# Patient Record
Sex: MALE | Race: WHITE | NOT HISPANIC OR LATINO | Employment: OTHER | URBAN - METROPOLITAN AREA
[De-identification: names, ages, dates, MRNs, and addresses within clinical notes are randomized per-mention and may not be internally consistent; named-entity substitution may affect disease eponyms.]

---

## 2017-12-12 ENCOUNTER — IMMUNIZATION (OUTPATIENT)
Dept: FAMILY MEDICINE | Facility: CLINIC | Age: 77
End: 2017-12-12
Payer: MEDICARE

## 2017-12-12 PROCEDURE — G0008 ADMIN INFLUENZA VIRUS VAC: HCPCS | Mod: S$GLB,,, | Performed by: INTERNAL MEDICINE

## 2017-12-12 PROCEDURE — 90662 IIV NO PRSV INCREASED AG IM: CPT | Mod: S$GLB,,, | Performed by: INTERNAL MEDICINE

## 2018-01-29 ENCOUNTER — DOCUMENTATION ONLY (OUTPATIENT)
Dept: FAMILY MEDICINE | Facility: CLINIC | Age: 78
End: 2018-01-29

## 2018-01-29 NOTE — PROGRESS NOTES
Pre-Visit Chart Review  For Appointment Scheduled on 1/30/18.    Health Maintenance Due   Topic Date Due    Lipid Panel  1940    TETANUS VACCINE  10/17/1957    Zoster Vaccine  10/17/1999    Pneumococcal (65+) (1 of 2 - PCV13) 10/17/2004

## 2018-01-30 ENCOUNTER — LAB VISIT (OUTPATIENT)
Dept: LAB | Facility: HOSPITAL | Age: 78
End: 2018-01-30
Attending: FAMILY MEDICINE
Payer: MEDICARE

## 2018-01-30 ENCOUNTER — OFFICE VISIT (OUTPATIENT)
Dept: FAMILY MEDICINE | Facility: CLINIC | Age: 78
End: 2018-01-30
Payer: MEDICARE

## 2018-01-30 VITALS
DIASTOLIC BLOOD PRESSURE: 76 MMHG | HEART RATE: 65 BPM | BODY MASS INDEX: 37.65 KG/M2 | TEMPERATURE: 98 F | WEIGHT: 263 LBS | SYSTOLIC BLOOD PRESSURE: 130 MMHG | RESPIRATION RATE: 20 BRPM | HEIGHT: 70 IN

## 2018-01-30 DIAGNOSIS — Z29.9 PREVENTIVE MEASURE: ICD-10-CM

## 2018-01-30 DIAGNOSIS — R35.0 INCREASED URINARY FREQUENCY: ICD-10-CM

## 2018-01-30 DIAGNOSIS — Z00.00 ANNUAL PHYSICAL EXAM: Primary | ICD-10-CM

## 2018-01-30 DIAGNOSIS — Z11.4 ENCOUNTER FOR SCREENING FOR HIV: ICD-10-CM

## 2018-01-30 DIAGNOSIS — Z00.00 ANNUAL PHYSICAL EXAM: ICD-10-CM

## 2018-01-30 DIAGNOSIS — N40.0 BENIGN PROSTATIC HYPERPLASIA, UNSPECIFIED WHETHER LOWER URINARY TRACT SYMPTOMS PRESENT: ICD-10-CM

## 2018-01-30 DIAGNOSIS — L57.0 ACTINIC KERATOSIS: ICD-10-CM

## 2018-01-30 LAB
25(OH)D3+25(OH)D2 SERPL-MCNC: 22 NG/ML
ALBUMIN SERPL BCP-MCNC: 3.8 G/DL
ALP SERPL-CCNC: 72 U/L
ALT SERPL W/O P-5'-P-CCNC: 16 U/L
ANION GAP SERPL CALC-SCNC: 14 MMOL/L
AST SERPL-CCNC: 16 U/L
BASOPHILS # BLD AUTO: 0.07 K/UL
BASOPHILS NFR BLD: 0.9 %
BILIRUB SERPL-MCNC: 0.6 MG/DL
BUN SERPL-MCNC: 18 MG/DL
CALCIUM SERPL-MCNC: 10.3 MG/DL
CHLORIDE SERPL-SCNC: 102 MMOL/L
CHOLEST SERPL-MCNC: 213 MG/DL
CHOLEST/HDLC SERPL: 5.1 {RATIO}
CO2 SERPL-SCNC: 24 MMOL/L
COMPLEXED PSA SERPL-MCNC: 9.7 NG/ML
CREAT SERPL-MCNC: 1 MG/DL
DIFFERENTIAL METHOD: NORMAL
EOSINOPHIL # BLD AUTO: 0.1 K/UL
EOSINOPHIL NFR BLD: 1.5 %
ERYTHROCYTE [DISTWIDTH] IN BLOOD BY AUTOMATED COUNT: 12.9 %
EST. GFR  (AFRICAN AMERICAN): >60 ML/MIN/1.73 M^2
EST. GFR  (NON AFRICAN AMERICAN): >60 ML/MIN/1.73 M^2
ESTIMATED AVG GLUCOSE: 117 MG/DL
GLUCOSE SERPL-MCNC: 92 MG/DL
HBA1C MFR BLD HPLC: 5.7 %
HCT VFR BLD AUTO: 45 %
HCV AB SERPL QL IA: NEGATIVE
HDLC SERPL-MCNC: 42 MG/DL
HDLC SERPL: 19.7 %
HGB BLD-MCNC: 15.3 G/DL
HIV 1+2 AB+HIV1 P24 AG SERPL QL IA: NEGATIVE
IMM GRANULOCYTES # BLD AUTO: 0.02 K/UL
IMM GRANULOCYTES NFR BLD AUTO: 0.3 %
LDLC SERPL CALC-MCNC: 150 MG/DL
LYMPHOCYTES # BLD AUTO: 1.6 K/UL
LYMPHOCYTES NFR BLD: 19.7 %
MCH RBC QN AUTO: 30.2 PG
MCHC RBC AUTO-ENTMCNC: 34 G/DL
MCV RBC AUTO: 89 FL
MONOCYTES # BLD AUTO: 0.9 K/UL
MONOCYTES NFR BLD: 10.8 %
NEUTROPHILS # BLD AUTO: 5.3 K/UL
NEUTROPHILS NFR BLD: 66.8 %
NONHDLC SERPL-MCNC: 171 MG/DL
NRBC BLD-RTO: 0 /100 WBC
PLATELET # BLD AUTO: 251 K/UL
PMV BLD AUTO: 10.6 FL
POTASSIUM SERPL-SCNC: 4.3 MMOL/L
PROT SERPL-MCNC: 7.7 G/DL
RBC # BLD AUTO: 5.07 M/UL
SODIUM SERPL-SCNC: 140 MMOL/L
TRIGL SERPL-MCNC: 105 MG/DL
TSH SERPL DL<=0.005 MIU/L-ACNC: 1.76 UIU/ML
WBC # BLD AUTO: 7.97 K/UL

## 2018-01-30 PROCEDURE — 93010 ELECTROCARDIOGRAM REPORT: CPT | Mod: S$GLB,,, | Performed by: INTERNAL MEDICINE

## 2018-01-30 PROCEDURE — 83036 HEMOGLOBIN GLYCOSYLATED A1C: CPT

## 2018-01-30 PROCEDURE — 36415 COLL VENOUS BLD VENIPUNCTURE: CPT | Mod: PO

## 2018-01-30 PROCEDURE — 82306 VITAMIN D 25 HYDROXY: CPT

## 2018-01-30 PROCEDURE — 99387 INIT PM E/M NEW PAT 65+ YRS: CPT | Mod: S$GLB,,, | Performed by: FAMILY MEDICINE

## 2018-01-30 PROCEDURE — 84153 ASSAY OF PSA TOTAL: CPT

## 2018-01-30 PROCEDURE — 99999 PR PBB SHADOW E&M-EST. PATIENT-LVL IV: CPT | Mod: PBBFAC,,, | Performed by: FAMILY MEDICINE

## 2018-01-30 PROCEDURE — 80053 COMPREHEN METABOLIC PANEL: CPT

## 2018-01-30 PROCEDURE — 86592 SYPHILIS TEST NON-TREP QUAL: CPT

## 2018-01-30 PROCEDURE — 86803 HEPATITIS C AB TEST: CPT

## 2018-01-30 PROCEDURE — 86703 HIV-1/HIV-2 1 RESULT ANTBDY: CPT

## 2018-01-30 PROCEDURE — 80061 LIPID PANEL: CPT

## 2018-01-30 PROCEDURE — 93005 ELECTROCARDIOGRAM TRACING: CPT | Mod: S$GLB,,, | Performed by: FAMILY MEDICINE

## 2018-01-30 PROCEDURE — 84443 ASSAY THYROID STIM HORMONE: CPT

## 2018-01-30 PROCEDURE — 85025 COMPLETE CBC W/AUTO DIFF WBC: CPT

## 2018-01-30 RX ORDER — ASPIRIN 81 MG/1
81 TABLET ORAL DAILY
COMMUNITY
End: 2020-10-30

## 2018-01-30 RX ORDER — TAMSULOSIN HYDROCHLORIDE 0.4 MG/1
0.4 CAPSULE ORAL DAILY
Qty: 30 CAPSULE | Refills: 1 | Status: SHIPPED | OUTPATIENT
Start: 2018-01-30 | End: 2018-03-29 | Stop reason: SDUPTHER

## 2018-01-30 NOTE — PROGRESS NOTES
[facility]                                                History and Physical Note    Subjective:         Patient ID: Ramon Reilly is a 77 y.o. male.    Chief Complaint: Annual Exam and Establish Care    HPI 78 yo M with no significant PMHx here to establish care. He recently relocated here with his wife from [place]. He reports increased urinary frequency. He urinates every 1-2 hours and 2-3 times at night. He admits to having a weak stream, decreased urine volume and urgency. He denies any dysuria, foul-smelling urine, fevers or abdominal pain. He also reports tingling and numbness in his feet over the past 3 years. He denies polyuria, polydipsia, polyphagia, weight loss or fatigue. He has multiple skin lesions that will need to be evaluated.     History reviewed. No pertinent past medical history.  Past Surgical History:   Procedure Laterality Date    EYE SURGERY Right     eye lid surgery     HERNIA REPAIR  1990    WISDOM TOOTH EXTRACTION       History reviewed. No pertinent family history.  Social History   Substance Use Topics    Smoking status: Former Smoker    Smokeless tobacco: Never Used    Alcohol use Yes      Comment: social, at weddings      Review of patient's allergies indicates:  No Known Allergies    Review of Systems   Constitutional: Negative for appetite change, chills, diaphoresis, fatigue, fever and unexpected weight change.   HENT: Negative for rhinorrhea, sinus pain, sinus pressure, sore throat and tinnitus.    Eyes: Negative for photophobia and visual disturbance.   Respiratory: Negative for cough, chest tightness, shortness of breath and wheezing.    Cardiovascular: Negative for chest pain, palpitations and leg swelling.   Gastrointestinal: Negative for abdominal distention, abdominal pain, blood in stool, constipation, diarrhea, nausea and vomiting.   Endocrine: Negative for polydipsia, polyphagia and polyuria.  "  Genitourinary: Positive for decreased urine volume, difficulty urinating, frequency and urgency. Negative for dysuria, flank pain and hematuria.   Musculoskeletal: Negative for arthralgias, back pain and myalgias.   Skin: Positive for rash. Negative for color change.   Neurological: Positive for numbness. Negative for dizziness, weakness, light-headedness and headaches.       Objective:      Vitals:    01/30/18 0907 01/30/18 0924   BP: (!) 146/86 130/76   BP Location: Right arm Right arm   Patient Position: Sitting Sitting   BP Method: Large (Automatic) Large (Automatic)   Pulse: 65    Resp: 20    Temp: 97.9 °F (36.6 °C)    TempSrc: Oral    Weight: 119.3 kg (263 lb 0.1 oz)    Height: 5' 10" (1.778 m)      Body mass index is 37.74 kg/m².  Physical Exam   Constitutional: He is oriented to person, place, and time. He appears well-developed and well-nourished.   HENT:   Head: Normocephalic and atraumatic.   Eyes: Right eye exhibits no discharge. Left eye exhibits no discharge. No scleral icterus.   Neck: Normal range of motion. No thyromegaly present.   Cardiovascular: Normal rate, regular rhythm, normal heart sounds and intact distal pulses.  Exam reveals no gallop and no friction rub.    No murmur heard.  Pulmonary/Chest: Effort normal and breath sounds normal. No respiratory distress. He has no wheezes. He has no rales.   Abdominal: Soft. Bowel sounds are normal. He exhibits no distension. There is no tenderness. A hernia is present.   Musculoskeletal: Normal range of motion.   Neurological: He is alert and oriented to person, place, and time.   Skin: Skin is warm and dry. Rash noted.   Psychiatric: He has a normal mood and affect.       Assessment:       1. Annual physical exam    2. Preventive measure    3. Encounter for screening for HIV    4. Increased urinary frequency    5. Benign prostatic hyperplasia, unspecified whether lower urinary tract symptoms present    6. Actinic keratosis        Plan:       Annual " physical exam  -     CBC auto differential; Future; Expected date: 01/30/2018  -     Comprehensive metabolic panel; Future; Expected date: 01/30/2018  -     Hemoglobin A1c; Future; Expected date: 01/30/2018  -     Lipid panel; Future; Expected date: 01/30/2018  -     TSH; Future; Expected date: 01/30/2018  -     Vitamin D; Future; Expected date: 01/30/2018  -     EKG 12-lead    Preventive measure  -     Hepatitis C antibody; Future; Expected date: 01/30/2018  -     RPR; Future; Expected date: 01/30/2018    Encounter for screening for HIV  -     HIV-1 and HIV-2 antibodies; Future; Expected date: 01/30/2018    Increased urinary frequency  -     PSA, Screening; Future; Expected date: 01/30/2018    Benign prostatic hyperplasia, unspecified whether lower urinary tract symptoms present  -     tamsulosin (FLOMAX) 0.4 mg Cp24; Take 1 capsule (0.4 mg total) by mouth once daily.  Dispense: 30 capsule; Refill: 1    Actinic keratosis  -     Ambulatory referral to Dermatology      Follow-up in about 2 weeks (around 2/13/2018).  Antwan Fontana MD  [facility]  1/30/2018 10:16 AM

## 2018-01-31 LAB — RPR SER QL: NORMAL

## 2018-02-01 DIAGNOSIS — R97.20 ELEVATED PSA: Primary | ICD-10-CM

## 2018-02-14 ENCOUNTER — DOCUMENTATION ONLY (OUTPATIENT)
Dept: FAMILY MEDICINE | Facility: CLINIC | Age: 78
End: 2018-02-14

## 2018-02-14 NOTE — PROGRESS NOTES
Pre-Visit Chart Review  For Appointment Scheduled on 02/16/2018    Health Maintenance Due   Topic Date Due    TETANUS VACCINE  10/17/1957    Zoster Vaccine  10/17/1999    Pneumococcal (65+) (1 of 2 - PCV13) 10/17/2004

## 2018-02-15 ENCOUNTER — OFFICE VISIT (OUTPATIENT)
Dept: UROLOGY | Facility: CLINIC | Age: 78
End: 2018-02-15
Payer: MEDICARE

## 2018-02-15 VITALS
WEIGHT: 263 LBS | DIASTOLIC BLOOD PRESSURE: 81 MMHG | SYSTOLIC BLOOD PRESSURE: 149 MMHG | HEART RATE: 76 BPM | HEIGHT: 70 IN | BODY MASS INDEX: 37.65 KG/M2 | RESPIRATION RATE: 18 BRPM

## 2018-02-15 DIAGNOSIS — N42.9 ABNORMAL PROSTATE ON PHYSICAL EXAMINATION: ICD-10-CM

## 2018-02-15 DIAGNOSIS — N40.0 BPH WITH ELEVATED PSA: ICD-10-CM

## 2018-02-15 DIAGNOSIS — R97.20 ELEVATED PSA, LESS THAN 10 NG/ML: Primary | ICD-10-CM

## 2018-02-15 DIAGNOSIS — R97.20 BPH WITH ELEVATED PSA: ICD-10-CM

## 2018-02-15 LAB
BILIRUB SERPL-MCNC: NORMAL MG/DL
BLOOD URINE, POC: NORMAL
COLOR, POC UA: CLEAR
GLUCOSE UR QL STRIP: NORMAL
KETONES UR QL STRIP: NORMAL
LEUKOCYTE ESTERASE URINE, POC: NORMAL
NITRITE, POC UA: NORMAL
PH, POC UA: 7
POC RESIDUAL URINE VOLUME: 63 ML (ref 0–100)
PROTEIN, POC: NORMAL
SPECIFIC GRAVITY, POC UA: 1.01
UROBILINOGEN, POC UA: NORMAL

## 2018-02-15 PROCEDURE — 81001 URINALYSIS AUTO W/SCOPE: CPT | Mod: S$GLB,,, | Performed by: NURSE PRACTITIONER

## 2018-02-15 PROCEDURE — 1159F MED LIST DOCD IN RCRD: CPT | Mod: S$GLB,,, | Performed by: NURSE PRACTITIONER

## 2018-02-15 PROCEDURE — 99999 PR PBB SHADOW E&M-EST. PATIENT-LVL III: CPT | Mod: PBBFAC,,, | Performed by: NURSE PRACTITIONER

## 2018-02-15 PROCEDURE — 1126F AMNT PAIN NOTED NONE PRSNT: CPT | Mod: S$GLB,,, | Performed by: NURSE PRACTITIONER

## 2018-02-15 PROCEDURE — 99204 OFFICE O/P NEW MOD 45 MIN: CPT | Mod: 25,S$GLB,, | Performed by: NURSE PRACTITIONER

## 2018-02-15 PROCEDURE — 3008F BODY MASS INDEX DOCD: CPT | Mod: S$GLB,,, | Performed by: NURSE PRACTITIONER

## 2018-02-15 PROCEDURE — 51798 US URINE CAPACITY MEASURE: CPT | Mod: S$GLB,,, | Performed by: NURSE PRACTITIONER

## 2018-02-15 NOTE — PATIENT INSTRUCTIONS
PSA Test     PSA is a simple blood test.   The prostate specific antigen (PSA) test is a blood test. It can help find prostate cancer. PSA is a substance in semen. Its made by the prostate. It is normal for some PSA to leak from the prostate into the bloodstream. But sometimes, more than a normal amount of PSA gets into the blood. The PSA test measures the amount of PSA in the blood. If the test shows high blood level of PSA, other tests are needed to help find the cause.  Possible causes of increased PSA  Several things can cause extra PSA to enter the blood, such as:  · Prostate cancer  · Prostate infection (prostatitis)  · Enlarged prostate not due to cancer (benign prostatic hyperplasia, or BPH)  · Prostate massage  · Prostate biopsy  Why a PSA test is done  A PSA test can be done to check for prostate cancer. But the PSA test by itself cant tell for sure whether or not a man has prostate cancer. And not all health care providers agree if men should have PSA tests to screen for prostate cancer. The American Cancer Society and many other organizations advise men talk with their health care provider about if prostate cancer screening is right for them. Talk with your health care provider about the PSA test beginning around age 50, or earlier if youre at higher risk.  A PSA test may also be done if a problem is found during a routine prostate exam. And it may be done if you have symptoms that suggest that you have a prostate problem. You may need a PSA if you have symptoms such as:  · Needing to urinate more often  · Waking often to urinate at night  · Having to strain when urinating  · Seeing blood in your urine  · Having pain when urinating  How a PSA test is done  Before a PSA test, you may have a digital rectal exam (LIZZIE) of the prostate. For this exam, the health care provider inserts a lubricated, gloved finger into the rectum to feel the prostate. Then youll be sent to have your blood drawn for the PSA  test. The test may be done in the health care providers office. Or it may be at a lab, clinic, or hospital. Blood is taken from your arm and sent to a lab to be tested.  Getting your results  The time it takes to get your test results varies. Ask your health care provider when you can expect your results. You and your health care provider will discuss the results. A normal range for your PSA depends on a number of factors. These include your age and the size of your prostate. They also include your risk factors for cancer, your symptoms, and the results of any previous PSA tests youve had. All of these things are taken into account when your PSA tests numbers are assessed.  If you're at higher risk for prostate cancer  If you are -American or have a family history of prostate cancer, talk with your health care provider about PSA tests by age 40 to 45.   Date Last Reviewed: 3/24/2015  © 4609-6783 [employer]. [address] [place] [address]. All rights reserved. This information is not intended as a substitute for professional medical care. Always follow your healthcare professional's instructions.        Prostate-Specific Antigen (PSA)  Does this test have other names?  PSA  What is this test?  This test measures the level of prostate-specific antigen (PSA) in your blood.  The cells of the prostate gland make the protein called PSA. Men normally have low levels of PSA. If your PSA levels start to rise, it could mean you have prostate cancer, benign prostate conditions, inflammation, or an infection.  PSA testing is controversial because the U.S. Preventative Services Task Force discourages men who don't have any symptoms of prostate cancer from being screened. The task force says that PSA test results can lead to treating small cancers that would never become life-threatening.  But the American Cancer Society believes men should be told the risks and benefits of PSA testing and  allowed to make their own decision about if and when to be screened.  The American Urologic Society says that PSA screening is most important between the ages of 55 and 69. If you have a brother or father with prostate cancer or you are , you should start testing at age 40.   Why do I need this test?  You may have this test if you are 50 or older and your healthcare provider wants to screen you for prostate cancer. Some providers recommend screening at age 40 or 45 if you have a family history of prostate cancer or other risk factors.  You may also have this test if you have already been diagnosed with prostate cancer, so that your healthcare provider can monitor your treatment and see whether your cancer has come back.  What other tests might I have along with this test?  Your healthcare provider may also do a digital rectal exam (LIZZIE). A LIZZIE is a physical exam of the prostate, not a lab test. For the exam, your provider will place a gloved finger in your rectum and feel the prostate to check for any bumps or abnormal areas. The FDA recommends that a LIZZIE be done along with a PSA test.  What do my test results mean?  Many things may affect your lab test results. These include the method each lab uses to do the test. Even if your test results are different from the normal value, you may not have a problem. To learn what the results mean for you, talk with your healthcare provider.  Results are given in nanograms per milliliter ng/mL. Normal results are below 4.0 ng/mL. A rising PSA may mean that you have cancer. But the PSA results alone won't tell your healthcare provider whether it's cancer or a benign prostate condition. If your provider suspects cancer, he or she will likely suggest that you have a biopsy of the prostate to make the diagnosis.  How is this test done?  The test requires a blood sample, which is drawn through a needle from a vein in your arm.  Does this test pose any risks?  Taking  a blood sample with a needle carries risks that include bleeding, infection, bruising, or feeling dizzy. When the needle pricks your arm, you may feel a slight stinging sensation or pain. Afterward, the site may be slightly sore.  What might affect my test results?  An infection can affect your results, as can certain medicines. Riding a bicycle and ejaculation may also affect your results.  How do I get ready for this test?  You may need to abstain from sex and not ride a bicycle within 1 to 2 days of the test. In addition, be sure your healthcare provider knows about all medicines, herbs, vitamins, and supplements you are taking. This includes medicines that don't need a prescription and any illicit drugs you may use.     © 7365-5242 [employer]. [address] [place] [address], [place] [address]. All rights reserved. This information is not intended as a substitute for professional medical care. Always follow your healthcare professional's instructions.

## 2018-02-15 NOTE — LETTER
February 16, 2018      Antwan Fontana MD  [address]  [place] [address]           [place] [facility]  [address] [place] [address] [address]  [place] [address]  Phone: [phone]  Fax: [phone]          Patient: Ramon Reilly   MR Number: [id number]   YOB: 1940   Date of Visit: 2/15/2018       Dear Dr. Antwan Fontana:    Thank you for referring Ramon Reilly to me for evaluation. Attached you will find relevant portions of my assessment and plan of care.    If you have questions, please do not hesitate to call me. I look forward to following Ramon Reilly along with you.    Sincerely,    Thalia Ortega, [facility]    Enclosure  CC:  No Recipients    If you would like to receive this communication electronically, please contact [email or web address] or [phone] to request more information on [employer] access.    For providers and/or their staff who would like to refer a patient to [facility], please contact us through our one-stop-shop provider referral line, [facility] , at [phone].    If you feel you have received this communication in error or would no longer like to receive these types of communications, please e-mail [email or web address]

## 2018-02-15 NOTE — PROGRESS NOTES
[facility] Note  Staff: TOMASA Crockett    Referring provider and please cc: Dr. Fontana  PCP: Dr. Antwan Fontana    Chief Complaint: Elevated PSA level    Subjective:        HPI: Ramno Reilly is a 77 y.o. male NEW PATIENT to Urology clinic today referred by his PCP for an elevated PSA level of 9.7 done on 01/30/2018 and is here today for further evaluation of findings.  Pt is here accompanied by his wife to office visit today.     Questions asked pt during ov today:  Pt was started on Flomax 0.4 mg one tablet daily two weeks ago by Dr. Fontana  Urgency: Sometimes, urge incontinence? None  NTF: 1-2 from 3-4x night, DTF: None  Dysuria: No  Gross Hematuria:No  Straining:No, Hesistancy:No, Intermittency:No, Weak stream:No    Last PSA Screening:   Lab Results   Component Value Date    PSA 9.7 (H) 01/30/2018     REVIEW OF SYSTEMS:  Review of Systems   Constitutional: Negative for chills, diaphoresis, fever and weight loss.   HENT: Negative for congestion, hearing loss, nosebleeds and sore throat.    Eyes: Negative for blurred vision and pain.        Wears glasses   Respiratory: Negative for cough and wheezing.    Cardiovascular: Negative for chest pain, palpitations and leg swelling.   Gastrointestinal: Negative for abdominal pain, heartburn, nausea and vomiting.   Genitourinary: Negative for dysuria, flank pain, frequency, hematuria and urgency.        Elevated PSA level  Nocturia   Musculoskeletal: Negative for back pain, joint pain, myalgias and neck pain.   Skin: Negative for itching and rash.   Neurological: Negative for dizziness, tremors, sensory change, seizures, loss of consciousness, weakness and headaches.   Endo/Heme/Allergies: Does not bruise/bleed easily.   Psychiatric/Behavioral: Negative for depression and suicidal ideas. The patient is not nervous/anxious.      Physical Exam    PMHx:  History reviewed. No pertinent past medical history.  Kidney stones: No  Cataracts?  None; He wears glasses    PSHx:  Past Surgical History:   Procedure Laterality Date    EYE SURGERY Right     eye lid surgery     HERNIA REPAIR  1990    TONSILLECTOMY, ADENOIDECTOMY      WISDOM TOOTH EXTRACTION       Stents/Valves/Foreign Bodies: No  Cardiac Evaluation: No    Screening Studies  Colonoscopy: Never had one.    Fam Hx:   malignancies: No  .   kidney stones: no     Soc Hx:  Former tobacco user, quit in the 1980s    Allergies:  Patient has no known allergies.    Medications: reviewed   Anticoagulation: Yes - Ecotrin 81 mg one tablet daily    Objective:     Vitals:    02/15/18 1435   BP: (!) 149/81   Pulse: 76   Resp: 18     General:WDWN in NAD  Eyes: PERRLA, normal conjunctiva  Respiratory: no increased work on breathing, clear to auscultation  Cardiovascular: regular rate and rhythm. No obvious extremity edema.  GI: palpation of masses. No tenderness. No hepatosplenomegaly to palpation.  Musculoskeletal: normal range of motion of bilateral upper extremities. Normal muscle strength and tone.  Skin: no obvious rashes or lesions. No tightening of skin noted.  Neurologic: CN grossly normal. Normal sensation.   Psychiatric: awake, alert and oriented x 3. Mood and affect normal. Cooperative.    :  Inspection of anus and perineum normal  LIZZIE: ++Enlarged with nodular abnormalities felt on exam by me today.  No c/o pain or tenderness. SV not palpable. Normal sphincter tone. No hemhorroids.  PVR by bladder scan performed by MIRIAM Vargas MA today: 63 mL*    LABS REVIEW:  UA today:  Color:Clear, Yellow  Spec. Grav.  1.010  PH  7.0  Negative for leukocytes, nitrates, protein, glucose, ketones, urobili, bili, and blood.    UCx: No results found for this or any previous visit.  Cr:   Lab Results   Component Value Date    CREATININE 1.0 01/30/2018     Assessment:       1. Elevated PSA, less than 10 ng/ml          Plan:     -Pt was instructed to continue Flomax 0.4 mg daily for now.    F/u--Pt will be fit in to see  one of Urologists ASAP for abnormal PSA level and Prostate exam by me which was performed today.    [facility]: Active    Thalia Ortega, FNP-C

## 2018-02-16 ENCOUNTER — OFFICE VISIT (OUTPATIENT)
Dept: FAMILY MEDICINE | Facility: CLINIC | Age: 78
End: 2018-02-16
Payer: MEDICARE

## 2018-02-16 VITALS
HEART RATE: 62 BPM | SYSTOLIC BLOOD PRESSURE: 124 MMHG | TEMPERATURE: 99 F | WEIGHT: 269.63 LBS | DIASTOLIC BLOOD PRESSURE: 70 MMHG | HEIGHT: 70 IN | BODY MASS INDEX: 38.6 KG/M2

## 2018-02-16 DIAGNOSIS — E78.5 HYPERLIPIDEMIA, UNSPECIFIED HYPERLIPIDEMIA TYPE: ICD-10-CM

## 2018-02-16 DIAGNOSIS — R53.1 GENERAL WEAKNESS: ICD-10-CM

## 2018-02-16 DIAGNOSIS — R73.03 PREDIABETES: Primary | ICD-10-CM

## 2018-02-16 PROCEDURE — 1126F AMNT PAIN NOTED NONE PRSNT: CPT | Mod: S$GLB,,, | Performed by: PHYSICIAN ASSISTANT

## 2018-02-16 PROCEDURE — 99999 PR PBB SHADOW E&M-EST. PATIENT-LVL IV: CPT | Mod: PBBFAC,,, | Performed by: PHYSICIAN ASSISTANT

## 2018-02-16 PROCEDURE — 3008F BODY MASS INDEX DOCD: CPT | Mod: S$GLB,,, | Performed by: PHYSICIAN ASSISTANT

## 2018-02-16 PROCEDURE — G0009 ADMIN PNEUMOCOCCAL VACCINE: HCPCS | Mod: S$GLB,,, | Performed by: FAMILY MEDICINE

## 2018-02-16 PROCEDURE — 99214 OFFICE O/P EST MOD 30 MIN: CPT | Mod: S$GLB,,, | Performed by: PHYSICIAN ASSISTANT

## 2018-02-16 PROCEDURE — 90732 PPSV23 VACC 2 YRS+ SUBQ/IM: CPT | Mod: S$GLB,,, | Performed by: FAMILY MEDICINE

## 2018-02-16 PROCEDURE — 1159F MED LIST DOCD IN RCRD: CPT | Mod: S$GLB,,, | Performed by: PHYSICIAN ASSISTANT

## 2018-02-16 NOTE — PATIENT INSTRUCTIONS
Lifestyle Changes to Control Cholesterol  You can control your cholesterol through diet, exercise, weight management, quitting smoking, stress management, and taking your medicines right. These things can also lower your risk for cardiovascular disease.    Eating healthy  Your healthcare provider will give you information on diet changes you may need to make. Your provider may recommend that you see a registered dietitian for help with diet changes. Changes may include:  · Cutting back on the amount of fat and cholesterol in your meals  · Eating less salt (sodium). This is especially important if you have high blood pressure.  · Eating more fresh vegetables and fruits  · Eating lean proteins such as fish, poultry, beans, and peas  · Eating less red meat and processed meats  · Using low-fat dairy products  · Using vegetable and nut oils in limited amounts  · Limiting how many sweets and processed foods like chips, cookies, and baked goods that you eat   · Limiting how many sugar-sweetened beverages you drink  · Limiting how often you eat out  Getting exercise  Regular exercise is a good way to help your body control cholesterol. Regular exercise can help in many ways. It can:  · Raise your good cholesterol  · Help lower your bad cholesterol  · Let blood flow better through your body  · Give more oxygen to your muscles and tissues  · Help you manage your weight  · Help your heart pump better  · Lower your blood pressure  Your healthcare provider may recommend that you get more physical activity if you haven't been active. Your provider may recommend that you get moderate to vigorous physical activity for at least 40 minutes each day. You should do this for at least 3 to 4 days each week. A few examples of moderate to vigorous activity are:  · Walking at a brisk pace. This is about 3 to 4 miles per hour.  · Jogging or running  · Swimming or water aerobics  · Hiking  · Dancing  · Martial arts  · Tennis  · Riding a  bicycle or stationary bike  · Dancing  Managing your weight  If you are overweight or obese, your healthcare provider will work with you to help you lose weight and lower your BMI (body mass index). Making diet changes and getting more physical activity can help. Changing your diet will help you lose weight more easily than adding exercise.  Quitting smoking  Smoking and other tobacco use can raise cholesterol and make it harder to control. Quitting is tough. But millions of people have given up tobacco for good. You can quit, too! Think about some of the reasons below to quit smoking. Do any of them make you think twice about your smoking habit?  Stop smoking because it:  · Keeps your cholesterol high, even if you make all the other changes youre supposed to  · Damages your body. It especially harms your heart, lungs, skin, and blood vessels.  · Makes you more likely to have a heart attack (acute myocardial infarction), stroke, or cancer  · Stains your teeth  · Makes your skin, clothes, and breath smell bad  · Costs a lot of money  Controlling stress   Learn ways to control stress. This will help you deal with stress in your home and work life. Controlling stress can greatly lower your risk of getting cardiovascular disease.  Making the most of medicines  Healthy eating and exercise are a good start to keeping your cholesterol down. But you may need some extra help from medicine. If your doctor prescribes medicine, be sure to take it exactly as directed. Remember:  · Tell your healthcare provider about all other medicines you take. This includes vitamins and herbs.  · Tell your healthcare provider if you have any side effects after starting to take a medicine. Examples of side effects to watch for include muscle aches, weakness, blurred vision, rust-colored urine, yellowing of eyes or skin (jaundice), and headache.  · Dont skip a dose or stop taking your medicine because you feel better or because  your cholesterol numbers go down. Never stop taking your medicine unless your healthcare provider has told you its OK.  · Ask your healthcare provider if you have any questions about your medicines.  High risk groups  Some people may need to take medicines called statins to control their cholesterol. This is in addition to eating a healthy diet and getting regular exercise.  Statins can help you stay healthy. They can also help prevent a heart attack or stroke. You may need to take a statin if you are in one of these groups:  · Adults who have had a heart attack or stroke. Or adults who have had peripheral vascular disease, a ministroke (transient ischemic attack), or stable or unstable angina. This group also includes people who have had a procedure to restore blood flow through a blocked artery. These procedures include percutaneous coronary intervention, angioplasty, stent, and open-heart bypass surgery.  · Adults who have diabetes. Or adults who are at higher risk of having a heart attack or stroke and have an LDL cholesterol level of 70 to 189 mg/dL  · Adults who are 21 years old or older and have an LDL cholesterol level of 190 mg/dL or higher.  If you are in a high-risk group, talk with your healthcare provider about your treatment goals. Make sure you understand why these goals are important, based on your own health history and your family history of heart disease or high cholesterol.  Make a plan to have regular cholesterol checks. You may need to fast before getting this test. Also ask your provider about any side effects your medicines may cause. Let your provider know about any side effects you have. You may need to take more than one medicine to reach the cholesterol goals that you and your provider decide on.  Date Last Reviewed: 10/1/2016  © 1695-9117 [employer]. [address] [place] [address], [place] [address]. All rights reserved. This information is not intended as a substitute for  professional medical care. Always follow your healthcare professional's instructions.        Prediabetes  You have been diagnosed with prediabetes. This means that the level of sugar (glucose) in your blood is too high. If you have prediabetes, you are at risk for developing type 2 diabetes. Type 2 diabetes is diagnosed when the level of glucose in the blood reaches a certain high level. With prediabetes, it hasnt reached this point yet, but it is higher than normal. It is vital to make lifestyle changes to lower your blood sugar, improve your health, and prevent diabetes. This sheet will tell you more.      Why worry about prediabetes?  Prediabetes is a disease where the bodys cells have trouble using glucose in the blood for energy. As a result, too much glucose stays in the blood and can affect how your heart and blood vessels work. Without changes in diet and lifestyle, the problem can get worse. Once you have type 2 diabetes, it is chronic (ongoing) and needs to be managed for the rest of your life. Diabetes can harm the body and your health by damaging organs, such as your eyes and kidneys. It makes you more likely to have heart disease. And it can damage nerves and blood vessels.  Who is a risk for prediabetes?  The exact cause of prediabetes is not clear. But certain risk factors make a person more likely to have it. These include:  · A family history of type 2 diabetes  · Being overweight  · Being over age 45  · Have hypertension or elevated cholesterol   · Having had gestational diabetes  · Not being physically active  · Being ,  American, , , , or   Diagnosing prediabetes  Prediabetes may have no symptoms or you may have some of the symptoms of diabetes. The diagnosis is made with a blood test. You may have one or more of these blood tests:   · Fasting glucose test. Blood is taken and tested after you have fasted (not eaten) for at  least 8 hours. A normal test result is 99 milligrams per deciliter (mg/dL) or lower. Prediabetes is 100 mg/dL to 125 mg/dL. Diabetes is 126 mg/dL or higher.  · Glucose tolerance test. Your blood sugar is measured before and after you drink a very sugary liquid. A normal test result is 139 milligrams per deciliter (mg/dL) or lower. Prediabetes is 140 mg/dL to 199 mg/dL. Diabetes is 200 mg/dL or higher.  · Hemoglobin A1c (HbA1c). Your HbA1c is normal if it is below 5.7%. Prediabetes is 5.7% to 6.4%. Diabetes is 6.5% or higher.   Treating prediabetes  The best way to treat prediabetes is to lose at least 5% to 7% of your current weight and be more physically active by getting at least 150 minutes a week of physical activity. When sitting for long periods of time, get up for short sessions of light activity every 30 minutes. These changes help the bodys cells use blood sugar better. Even a small amount of weight loss can help. Work with your healthcare provider to make a plan to eat well and be more active. Keep in mind that small changes can add up. Other changes in your lifestyle (or even taking certain medicines, such as metformin) may make you less likely to develop diabetes. Your healthcare provider can talk with you about these.  Follow-up  If it is untreated, prediabetes can turn into diabetes. This is a serious health condition. Take steps to stop this from happening. Follow the treatment plan you have been given. You may have your blood glucose tested again in about 12 to 18 months.  Symptoms of diabetes  Let your healthcare provider know if you have any of the following:  · Always feel very tired  · Feel very thirsty or hungry much of the time  · Have to urinate often  · Lose weight for no reason  · Feel numbness or tingling in your fingers or toes  · Have cuts or bruises that dont seem to heal  · Have blurry vision   Date Last Reviewed: 5/1/2016  © 9518-7174 [employer]. [address] [place] [address]  Road, [place] [address]. All rights reserved. This information is not intended as a substitute for professional medical care. Always follow your healthcare professional's instructions.        Low-Cholesterol Diet  Your body needs cholesterol to build new cells and create certain hormones. There are 2 kinds of cholesterol in your blood:     · HDL (good) cholesterol. This prevents fat deposits (plaque) from building up in your arteries. In this way it protects against heart disease and stroke.  · LDL (bad) cholesterol. This stays in your body and sticks to artery walls. Over time it may block blood flow to the heart and brain. This can cause a heart attack or stroke.  The cholesterol in your blood comes from 2 sources: cholesterol in food that you eat and cholesterol that your liver makes. You should limit the amount of cholesterol in your diet. But the cholesterol that your body makes has the greatest disease risk. And your body makes more cholesterol when your diet is high in bad fats (saturated and trans fats). There are 2 kinds of fats you can eat:  · Good fats, or unsaturated fats (mono-unsaturated and poly-unsaturated). They raise the level of good cholesterol and lower the level of bad cholesterol. Good fats are found in vegetable oils such as olive, sunflower, corn, and soybean oils, and in nuts and seeds.  · Bad fats, or saturated fats (including foods high in cholesterol) and trans fats. These raise your risk of disease. They lower the good cholesterol and raise the level of bad cholesterol. Bad fats are found in animal products, including meat, whole-milk dairy products, and butter. Some plants are also high in bad fats (coconut and palm plants). Trans fats are found in hard (stick) margarines. They are also in many fast foods and commercially baked goods. Soft margarine sold in tubs has fewer trans fats and is safer to use.  High blood cholesterol is usually due to a diet high in saturated fat, along  with not being physically active. In some cases, genetics plays a role in causing high cholesterol. The tips below will help you create healthy eating habits that will help lower your blood cholesterol level.  Create a diet high in good fats, low in bad fats (and low in cholesterol)  The following steps will help you create a diet high in good fats and low in bad fats:  · Talk with your doctor before starting a low cholesterol diet or weight loss program.  · Learn to read nutrition labels and select appropriate portion sizes.  · When cooking, use plant-based unsaturated vegetable oils (sunflower, corn, soybean, canola, peanut, and olive oils).  · Avoid saturated fats found in animal products such as meat, dairy (whole-milk, cheese and ice cream), poultry skin, and egg yolks. Plants high in saturated oils include coconut oil, palm oil, and palm kernel oil.  · If you eat meat, choose smaller portions and lean cuts, such as round, varun, sirloin, or loin. Eat more meatless meals.  · Replace meat with fish at least 2 times a week. Fish is an important source of the unsaturated fat called omega-3 fatty acids. This fat has potential to lower the risk of heart disease.  · Replace whole-milk dairy products with low-fat or nonfat products. Try soy products. Soy helps to reduce total cholesterol.  · Supplement your diet with protective fibers. Eat nuts, seeds, and whole grains rather than white rice and bread. These foods lower both cholesterol and triglyceride levels. (Triglycerides are another fat found in the blood.) Walnuts are one of the best sources of omega-3 fatty acids.  · Eat plenty of fresh fruits and vegetables daily.  · Avoid fast foods and commercial baked goods. Assume they contain saturated fat unless labeled otherwise.  Date Last Reviewed: 8/1/2016  © 4778-9757 [employer]. [address] [place] [address], [place] [address]. All rights reserved. This information is not intended as a substitute for  professional medical care. Always follow your healthcare professional's instructions.        Diet: Diabetes  Food is an important tool that you can use to control diabetes and stay healthy. Eating well-balanced meals in the correct amounts will help you control your blood glucose levels and prevent low blood sugar reactions. It will also help you reduce the health risks of diabetes. There is no one specific diet that is right for everyone with diabetes. But there are general guidelines to follow. A registered dietitian (MARNIE) will create a tailored diet approach thats just right for you. He or she will also help you plan healthy meals and snacks. If you have any questions, call your dietitian for advice.     Guidelines for success  Talk with your healthcare provider before starting a diabetes diet or weight loss program. If you haven't talked with a dietitian yet, ask your provider for a referral. The following guidelines can help you succeed:  · Select foods from the 6 food groups below. Your dietitian will help you find food choices within each group. He or she will also show you serving sizes and how many servings you can have at each meal.  ¨ Grains, beans, and starchy vegetables  ¨ Vegetables  ¨ Fruit  ¨ Milk or yogurt  ¨ Meat, poultry, fish, or tofu  ¨ Healthy fats  · Check your blood sugar levels as directed by your provider. Take any medicine as prescribed by your provider.  · Learn to read food labels and pick the right portion sizes.  · Eat only the amount of food in your meal plan. Eat about the same amount of food at regular times each day. Dont skip meals. Eat meals 4 to 5 hours apart, with snacks in between.  · Limit alcohol. It raises blood sugar levels. Drink water or calorie-free diet drinks that use safe sweeteners.  · Eat less fat to help lower your risk of heart disease. Use nonfat or low-fat dairy products and lean meats. Avoid fried foods. Use cooking oils that are unsaturated, such as olive,  canola, or peanut oil.  · Talk with your dietitian about safe sugar substitutes.  · Avoid added salt. It can contribute to high blood pressure, which can cause heart disease. People with diabetes already have a risk of high blood pressure and heart disease.  · Stay at a healthy weight. If you need to lose weight, cut down on your portion sizes. But dont skip meals. Exercise is an important part of any weight management program. Talk with your provider about an exercise program thats right for you.  · For more information about the best diet plan for you, talk with a registered dietitian (RD). To find an RD in your area, contact:  ¨ Academy of Nutrition and Dietetics www.eatright.org  ¨ The American Diabetes Association [phone] www.diabetes.org  Date Last Reviewed: 8/1/2016  © 4247-0908 [employer]. [address] [place] [address], [place] [address]. All rights reserved. This information is not intended as a substitute for professional medical care. Always follow your healthcare professional's instructions.

## 2018-03-02 ENCOUNTER — CLINICAL SUPPORT (OUTPATIENT)
Dept: REHABILITATION | Facility: HOSPITAL | Age: 78
End: 2018-03-02
Payer: MEDICARE

## 2018-03-02 DIAGNOSIS — R53.1 WEAKNESS: Primary | ICD-10-CM

## 2018-03-02 DIAGNOSIS — M25.562 PAIN IN BOTH KNEES, UNSPECIFIED CHRONICITY: ICD-10-CM

## 2018-03-02 DIAGNOSIS — M25.561 PAIN IN BOTH KNEES, UNSPECIFIED CHRONICITY: ICD-10-CM

## 2018-03-02 PROCEDURE — 97161 PT EVAL LOW COMPLEX 20 MIN: CPT | Mod: PN

## 2018-03-02 PROCEDURE — G8978 MOBILITY CURRENT STATUS: HCPCS | Mod: CK,PN

## 2018-03-02 PROCEDURE — G8979 MOBILITY GOAL STATUS: HCPCS | Mod: CI,PN

## 2018-03-02 NOTE — PLAN OF CARE
"TIME RECORD    03/02/2018    Start Time:  1410  Stop Time:  1509    PROCEDURES:    TIMED  Procedure Time Min.    Start:  Stop:     Start:  Stop:     Start:  Stop:     Start:  Stop:          UNTIMED  Procedure Time Min.   Evaluation Start:  Stop:     Start:  Stop:      Total Timed Minutes:  0  Total Timed Units:  0  Total Untimed Units:  1  Charges Billed/# of units:  1    OUTPATIENT PHYSICAL THERAPY   PATIENT EVALUATION  Onset Date: 5-6 years  Problem List Items Addressed This Visit     Weakness - Primary    Knee pain, bilateral          Medical Diagnosis: General weakness  Treatment Diagnosis: Gait abnormality    No past medical history on file.    Past Surgical History:   Procedure Laterality Date    EYE SURGERY Right     eye lid surgery     HERNIA REPAIR  1990    TONSILLECTOMY, ADENOIDECTOMY      WISDOM TOOTH EXTRACTION         has a current medication list which includes the following prescription(s): aspirin and tamsulosin.    Precautions: reports hernia that was repaired years ago but has recurred  Surgical history: see EMR  Prior Therapy: no  History of Present Illness: Patient is a 78 yo M who presents to physical therapy with c/o chronic bilateral knee pain and generalized weakness.  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 Stairs--Uses step-to pattern " "negotiating stairs in his home for safety, there is 1 handrail from the 2nd to 3rd level, but from first to second level there is a wall to hold onto. He thinks he can walk with alternating steps on the stairs outside from 2nd to third level since there is a bilateral handrail.  Reports significant difficulty with carrying anything up the stairs. Sometimes has to put down the item and move a few stairs at a time.  2. Getting up/down out of chairs. Reports has to rock several times in order to get up from his recliner but is afraid the forward momentum will make him fall.  3. Getting up from the floor, has to pull himself up with furniture  4. Balance issues--reports he has fallen 3x in the last year. Recent fall trying to  something from the floor--fell forward onto his knees.    Subjective     Ramon Reilly states can't walk very far due to SOB. States his knees feel like his knees "hyperextend," when he walks and as a result he is very careful about placing his feet when turning around or getting out of chairs.     Pain:  Location: generalized bilateral L>R knee, bilateral ankles  Description: "like a sprain"  Activities Which Increase Pain: turning, twisting  Activities Which Decrease Pain: aleve  Pain Scale:  2-3/10 now  6/10 at worst      Objective     Posture/Appearance:Fwd head position, rounded shoulders, right trunk lean  Palpation: Min TTP medial joint line at knees  Sensation: WFL  DTRs: NT  Range of Motion/Strength:    Lower Extremity Range of Motion:  Right Lower Extremity: WFL  Left Lower Extremity: WFL    Lower Extremity Strength:  Right Lower Extremity: hip flex 4/5, knee ext/flex 4+/5, ankle DF 5/5  Left Lower Extremity: hip flex 4+/5, knee ext 4/5, knee flex 4/5, ankle DF 4+/5    Flexibility: tightness bilateral gastrocs, hamstrings  Gait: mildly antalgic, decreased velocity, decreased floor clearance  Bed Mobility: Supine<>sit Independent  Transfers: Sit<>stand with bilateral UE " support, increased difficulty  Special Tests:   Romberg (-)  Standing with EO, feet together: 30 sec with sway  Functional Outcome Measure: Lower Extremity Functional Scale (LEFS): 46/80  G code tool used: LEFS (mobility)  Current modifier: CK  Goal modifier: CI  Treatment: Educated on role/goal PT, POC.  Pt verbalizing understanding and agreement.     Assessment       Initial Assessment (Pertinent finding, problem list and factors affecting outcome): Patient is a 76 yo M presenting to PT with c/o weakness and bilateral knee pain. Notable impairments include decreased flexibility, weakness, gait abnormality, decreased balance, and impaired functional mobility. Patient would benefit from skilled PT to address notable impairments and improve functional mobility to [facility].      Rehab Potiential: good      Short Term Goals (3 Weeks): 1) Pt will initiate HEP 2) Patient will improve LE strength by 1/2 muscle grade   Long Term Goals (6 Weeks): 1) Pt will be I with HEP 2) Pt will return to community ambulation with strength 5/5 3) Pt will improve LEFS to <20% impairment    Plan     Certification Period: 3/2/18-4/13/18  Recommended Treatment Plan: 2 times per week for 6 weeks: Gait Training, Group Therapy, Manual Therapy, Moist Heat/ Ice, Patient Education, Therapeutic Activites and Therapeutic Exercise  Other Recommendations:     Thank you for referral.    Therapist: Allegra Verma, PT    I CERTIFY THE NEED FOR THESE SERVICES FURNISHED UNDER THIS PLAN OF TREATMENT AND WHILE UNDER MY CARE    Physician's comments: ________________________________________________________________________________________________________________________________________________      Physician's Name: ___________________________________

## 2018-03-04 ENCOUNTER — HOSPITAL ENCOUNTER (EMERGENCY)
Facility: HOSPITAL | Age: 78
Discharge: HOME | End: 2018-03-04
Attending: EMERGENCY MEDICINE
Payer: MEDICARE

## 2018-03-04 VITALS
OXYGEN SATURATION: 97 % | SYSTOLIC BLOOD PRESSURE: 167 MMHG | RESPIRATION RATE: 16 BRPM | BODY MASS INDEX: 38.6 KG/M2 | WEIGHT: 269 LBS | TEMPERATURE: 97 F | DIASTOLIC BLOOD PRESSURE: 73 MMHG | HEART RATE: 74 BPM

## 2018-03-04 DIAGNOSIS — K80.20 CALCULUS OF GALLBLADDER WITHOUT CHOLECYSTITIS WITHOUT OBSTRUCTION: Primary | ICD-10-CM

## 2018-03-04 LAB
ALBUMIN SERPL BCP-MCNC: 4 G/DL
ALP SERPL-CCNC: 73 U/L
ALT SERPL W/O P-5'-P-CCNC: 47 U/L
ANION GAP SERPL CALC-SCNC: 10 MMOL/L
AST SERPL-CCNC: 67 U/L
BASOPHILS # BLD AUTO: 0.1 K/UL
BASOPHILS NFR BLD: 0.4 %
BILIRUB SERPL-MCNC: 1.7 MG/DL
BILIRUB UR QL STRIP: NEGATIVE
BUN SERPL-MCNC: 20 MG/DL
CALCIUM SERPL-MCNC: 9.6 MG/DL
CHLORIDE SERPL-SCNC: 103 MMOL/L
CLARITY UR: CLEAR
CO2 SERPL-SCNC: 26 MMOL/L
COLOR UR: YELLOW
CREAT SERPL-MCNC: 1 MG/DL
DIFFERENTIAL METHOD: ABNORMAL
EOSINOPHIL # BLD AUTO: 0.1 K/UL
EOSINOPHIL NFR BLD: 0.6 %
ERYTHROCYTE [DISTWIDTH] IN BLOOD BY AUTOMATED COUNT: 12.9 %
EST. GFR  (AFRICAN AMERICAN): >60 ML/MIN/1.73 M^2
EST. GFR  (NON AFRICAN AMERICAN): >60 ML/MIN/1.73 M^2
GLUCOSE SERPL-MCNC: 143 MG/DL
GLUCOSE UR QL STRIP: NEGATIVE
HCT VFR BLD AUTO: 46.5 %
HGB BLD-MCNC: 15.7 G/DL
HGB UR QL STRIP: NEGATIVE
KETONES UR QL STRIP: NEGATIVE
LEUKOCYTE ESTERASE UR QL STRIP: NEGATIVE
LIPASE SERPL-CCNC: 43 U/L
LYMPHOCYTES # BLD AUTO: 1.4 K/UL
LYMPHOCYTES NFR BLD: 7.9 %
MCH RBC QN AUTO: 29.6 PG
MCHC RBC AUTO-ENTMCNC: 33.8 G/DL
MCV RBC AUTO: 87 FL
MONOCYTES # BLD AUTO: 0.9 K/UL
MONOCYTES NFR BLD: 5.3 %
NEUTROPHILS # BLD AUTO: 14.6 K/UL
NEUTROPHILS NFR BLD: 85.8 %
NITRITE UR QL STRIP: NEGATIVE
PH UR STRIP: 6 [PH] (ref 5–8)
PLATELET # BLD AUTO: 245 K/UL
PMV BLD AUTO: 9.2 FL
POTASSIUM SERPL-SCNC: 4.2 MMOL/L
PROT SERPL-MCNC: 7.7 G/DL
PROT UR QL STRIP: NEGATIVE
RBC # BLD AUTO: 5.32 M/UL
SODIUM SERPL-SCNC: 139 MMOL/L
SP GR UR STRIP: 1.02 (ref 1–1.03)
URN SPEC COLLECT METH UR: NORMAL
UROBILINOGEN UR STRIP-ACNC: 1 EU/DL
WBC # BLD AUTO: 17.1 K/UL

## 2018-03-04 PROCEDURE — 25000003 PHARM REV CODE 250: Performed by: EMERGENCY MEDICINE

## 2018-03-04 PROCEDURE — 81003 URINALYSIS AUTO W/O SCOPE: CPT

## 2018-03-04 PROCEDURE — 63600175 PHARM REV CODE 636 W HCPCS: Performed by: EMERGENCY MEDICINE

## 2018-03-04 PROCEDURE — 80053 COMPREHEN METABOLIC PANEL: CPT

## 2018-03-04 PROCEDURE — 85025 COMPLETE CBC W/AUTO DIFF WBC: CPT

## 2018-03-04 PROCEDURE — 83690 ASSAY OF LIPASE: CPT

## 2018-03-04 PROCEDURE — 99284 EMERGENCY DEPT VISIT MOD MDM: CPT | Mod: 25

## 2018-03-04 PROCEDURE — 96374 THER/PROPH/DIAG INJ IV PUSH: CPT

## 2018-03-04 PROCEDURE — 36415 COLL VENOUS BLD VENIPUNCTURE: CPT

## 2018-03-04 RX ORDER — PANTOPRAZOLE SODIUM 40 MG/1
40 TABLET, DELAYED RELEASE ORAL
Status: COMPLETED | OUTPATIENT
Start: 2018-03-04 | End: 2018-03-04

## 2018-03-04 RX ORDER — KETOROLAC TROMETHAMINE 30 MG/ML
15 INJECTION, SOLUTION INTRAMUSCULAR; INTRAVENOUS
Status: COMPLETED | OUTPATIENT
Start: 2018-03-04 | End: 2018-03-04

## 2018-03-04 RX ADMIN — LIDOCAINE HYDROCHLORIDE: 20 SOLUTION ORAL; TOPICAL at 02:03

## 2018-03-04 RX ADMIN — KETOROLAC TROMETHAMINE 15 MG: 30 INJECTION, SOLUTION INTRAMUSCULAR at 02:03

## 2018-03-04 RX ADMIN — PANTOPRAZOLE SODIUM 40 MG: 40 TABLET, DELAYED RELEASE ORAL at 02:03

## 2018-03-04 NOTE — ED PROVIDER NOTES
Encounter Date: 3/4/2018    SCRIBE #1 NOTE: I, Rod Dillon, am scribing for, and in the presence of, Dr. Caro.       History     Chief Complaint   Patient presents with    Chest Pain       03/04/2018 2:41 PM     Chief complaint: abdominal pain      Ramon Reilly is a 77 y.o. male without any significant PMHx who presents to the ED accompanied by his wife c/o abdominal pain. The pain is located in his epigastric region. The patient states it began after he ate breakfast this morning at 9am. It became worse after he ate lunch about 2 hours PTA. He says he can't take a deep breath because it causes discomfort. He has never had this type of discomfort before. He has no previous history of ulcers. He denies having any fever or blood in his stools. He has NKDA.      The history is provided by the patient.     Review of patient's allergies indicates:  No Known Allergies  History reviewed. No pertinent past medical history.  Past Surgical History:   Procedure Laterality Date    EYE SURGERY Right     eye lid surgery     HERNIA REPAIR  1990    TONSILLECTOMY, ADENOIDECTOMY      WISDOM TOOTH EXTRACTION       History reviewed. No pertinent family history.  Social History   Substance Use Topics    Smoking status: Former Smoker    Smokeless tobacco: Never Used    Alcohol use Yes      Comment: social, at weddings     Review of Systems   Constitutional: Negative for fever.   HENT: Negative for sore throat.    Respiratory: Negative for shortness of breath.    Cardiovascular: Negative for chest pain.   Gastrointestinal: Positive for abdominal pain and nausea.   Genitourinary: Negative for dysuria.   Musculoskeletal: Negative for back pain.   Skin: Negative for rash.   Neurological: Negative for weakness.   Hematological: Does not bruise/bleed easily.       Physical Exam     Initial Vitals   BP Pulse Resp Temp SpO2   -- -- -- -- --      MAP       --         Physical Exam    Nursing note and vitals  reviewed.  Constitutional: He appears well-developed and well-nourished.   HENT:   Head: Normocephalic and atraumatic.   Eyes: Conjunctivae are normal.   Neck: Normal range of motion. Neck supple.   Cardiovascular: Normal rate, regular rhythm and normal heart sounds. Exam reveals no gallop and no friction rub.    No murmur heard.  Pulmonary/Chest: Breath sounds normal. No respiratory distress. He has no wheezes. He has no rhonchi. He has no rales.   Abdominal: Soft. Bowel sounds are normal. There is tenderness in the right upper quadrant and epigastric area. There is positive Norris's sign.   Musculoskeletal: Normal range of motion.   Neurological: He is alert and oriented to person, place, and time.   Skin: Skin is warm and dry.   Psychiatric: He has a normal mood and affect.         ED Course   Procedures  Labs Reviewed   CBC W/ AUTO DIFFERENTIAL - Abnormal; Notable for the following:        Result Value    WBC 17.10 (*)     Gran # (ANC) 14.6 (*)     Gran% 85.8 (*)     Lymph% 7.9 (*)     All other components within normal limits   COMPREHENSIVE METABOLIC PANEL - Abnormal; Notable for the following:     Glucose 143 (*)     Total Bilirubin 1.7 (*)     AST 67 (*)     ALT 47 (*)     All other components within normal limits   LIPASE   URINALYSIS     EKG Readings: (Independently Interpreted)   Rhythm: Normal Sinus Rhythm. Heart Rate: 79. Ectopy: No Ectopy. Conduction: Normal. ST Segments: Non-Specific ST Segment Depression. T Waves: Normal. Axis: Left Axis Deviation. Clinical Impression: Normal Sinus Rhythm          Medical Decision Making:   History:   Old Medical Records: I decided to obtain old medical records.  Clinical Tests:   Lab Tests: Ordered and Reviewed  Radiological Study: Ordered and Reviewed  Medical Tests: Ordered and Reviewed  ED Management:  Ramon Reilly is a 77 y.o. male who presents with  epigastric pain.  He has a positive Norris sign with an ultrasound reveals cholelithiasis without cystitis  a choledocholithiasis.  He has symptomatic relief with Toradol and a GI cocktail.  Symptoms most likely reflect biliary colic.  The absence of evidence of choledocholithiasis or cholecystitis makes admission unnecessary.  Gastritis/peptic ulcer disease remains in the differential diagnosis; therefore, patient is encouraged to take daily Prilosec.  He is referred to general surgery for consideration of a cholecystectomy.       APC / Resident Notes:   I, Dr. Wilder Caro III, personally performed the services described in this documentation. All medical record entries made by the scribe were at my direction and in my presence.  I have reviewed the chart and agree that the record reflects my personal performance and is accurate and complete. Wilder Caro III, MD.  4:17 PM 03/04/2018       Scribe Attestation:   Scribe #1: I performed the above scribed service and the documentation accurately describes the services I performed. I attest to the accuracy of the note.               Clinical Impression:   There were no encounter diagnoses.                           Wilder Caro III, MD  03/04/18 [id number]

## 2018-03-05 ENCOUNTER — TELEPHONE (OUTPATIENT)
Dept: BARIATRICS | Facility: CLINIC | Age: 78
End: 2018-03-05

## 2018-03-05 PROBLEM — R53.1 WEAKNESS: Status: ACTIVE | Noted: 2018-03-05

## 2018-03-05 PROBLEM — M25.562 KNEE PAIN, BILATERAL: Status: ACTIVE | Noted: 2018-03-05

## 2018-03-05 PROBLEM — M25.561 KNEE PAIN, BILATERAL: Status: ACTIVE | Noted: 2018-03-05

## 2018-03-05 NOTE — TELEPHONE ENCOUNTER
----- Message from Lenin Arroyo sent at 3/5/2018  8:36 AM CST -----  Contact: Patient  Ramon, [phone], calling to get a follow up appointment. Was seen in the ER yesterday 3/4/18, and found he have gallstones. Was told to get a follow up appointment within two days with Dr. Mcgovern. Please advise. Thanks.

## 2018-03-06 ENCOUNTER — OFFICE VISIT (OUTPATIENT)
Dept: BARIATRICS | Facility: CLINIC | Age: 78
End: 2018-03-06
Payer: MEDICARE

## 2018-03-06 VITALS
WEIGHT: 267.5 LBS | RESPIRATION RATE: 16 BRPM | HEART RATE: 72 BPM | HEIGHT: 70 IN | DIASTOLIC BLOOD PRESSURE: 67 MMHG | BODY MASS INDEX: 38.3 KG/M2 | TEMPERATURE: 98 F | SYSTOLIC BLOOD PRESSURE: 140 MMHG

## 2018-03-06 DIAGNOSIS — K81.9 CHOLECYSTITIS: ICD-10-CM

## 2018-03-06 DIAGNOSIS — K80.10 CALCULUS OF GALLBLADDER WITH CHRONIC CHOLECYSTITIS WITHOUT OBSTRUCTION: Primary | ICD-10-CM

## 2018-03-06 PROCEDURE — 99203 OFFICE O/P NEW LOW 30 MIN: CPT | Mod: S$GLB,,, | Performed by: SURGERY

## 2018-03-06 PROCEDURE — 99214 OFFICE O/P EST MOD 30 MIN: CPT | Mod: PBBFAC,PN | Performed by: SURGERY

## 2018-03-06 PROCEDURE — 99999 PR PBB SHADOW E&M-EST. PATIENT-LVL IV: CPT | Mod: PBBFAC,,, | Performed by: SURGERY

## 2018-03-06 RX ORDER — OMEPRAZOLE 20 MG/1
20 CAPSULE, DELAYED RELEASE ORAL NIGHTLY
COMMUNITY
End: 2018-05-16

## 2018-03-06 RX ORDER — CHOLECALCIFEROL (VITAMIN D3) 25 MCG
2000 TABLET ORAL DAILY
Status: ON HOLD | COMMUNITY
End: 2018-06-04

## 2018-03-06 NOTE — PROGRESS NOTES
Initial Consult    Chief Complaint   Patient presents with    Cholelithiasis       History of Present Illness:  Patient is a 77 y.o. male who is referred for evaluation abdominal pain. Pain described as epigastric, 8/10, constant, post prandial (30 min to an hour after eating), stopped after coming to ED.  No prior episodes.  No changes to stool.    Went to ED on 3/4/18 found to have gallstones and referred here.    Complains of blood in urine as well- sees urologist.    Review of patient's allergies indicates:  No Known Allergies    Current Outpatient Prescriptions   Medication Sig Dispense Refill    aspirin (ECOTRIN) 81 MG EC tablet Take 81 mg by mouth once daily.      omeprazole (PRILOSEC) 20 MG capsule Take 20 mg by mouth once daily.      tamsulosin (FLOMAX) 0.4 mg Cp24 Take 1 capsule (0.4 mg total) by mouth once daily. 30 capsule 1    vitamin D 1000 units Tab Take 2,000 Units by mouth once daily.       No current facility-administered medications for this visit.        No past medical history on file.  Past Surgical History:   Procedure Laterality Date    EYE SURGERY Right     eye lid surgery     HERNIA REPAIR  1990    TONSILLECTOMY, ADENOIDECTOMY      WISDOM TOOTH EXTRACTION       No family history on file.  Social History   Substance Use Topics    Smoking status: Former Smoker    Smokeless tobacco: Never Used    Alcohol use Yes      Comment: social, at weddings            Review of Systems:  Review of Systems   Constitutional: Positive for fever. Negative for appetite change, chills and diaphoresis.   HENT: Positive for sneezing. Negative for congestion, rhinorrhea, sinus pressure and tinnitus.    Eyes: Negative for pain, redness and itching.   Respiratory: Negative for cough, choking, chest tightness, shortness of breath, wheezing and stridor.    Cardiovascular: Negative for chest pain, palpitations and leg swelling.   Gastrointestinal: Positive for abdominal pain (see hpi) and constipation.  "Negative for abdominal distention, blood in stool, diarrhea, nausea, rectal pain and vomiting.   Endocrine: Negative for cold intolerance and heat intolerance.   Genitourinary: Positive for hematuria. Negative for difficulty urinating and flank pain.   Musculoskeletal: Positive for back pain. Negative for arthralgias and gait problem.   Skin: Negative for rash and wound.   Allergic/Immunologic: Negative for environmental allergies and food allergies.   Neurological: Positive for light-headedness. Negative for dizziness and headaches.   Hematological: Negative for adenopathy. Does not bruise/bleed easily.   Psychiatric/Behavioral: Negative for agitation, confusion and decreased concentration.       Physical:     Vital Signs (Most Recent)  Temp: 98.1 °F (36.7 °C) (03/06/18 1023)  Pulse: 72 (03/06/18 1023)  Resp: 16 (03/06/18 1023)  BP: (!) 140/67 (03/06/18 1023)  5' 10" (1.778 m)  121.3 kg (267 lb 8.5 oz)     Physical Exam:  Physical Exam   Constitutional: He is oriented to person, place, and time. He appears well-developed and well-nourished. No distress.   HENT:   Head: Atraumatic.   Mouth/Throat: No oropharyngeal exudate.   Eyes: EOM are normal. Pupils are equal, round, and reactive to light. Right eye exhibits no discharge. Left eye exhibits no discharge. No scleral icterus.   Neck: Normal range of motion. No JVD present. No tracheal deviation present. No thyromegaly present.   Cardiovascular: Normal rate and regular rhythm.  Exam reveals no gallop and no friction rub.    No murmur heard.  Pulmonary/Chest: Breath sounds normal. No respiratory distress. He has no wheezes. He has no rales. He exhibits no tenderness.   Abdominal: Soft. Bowel sounds are normal. He exhibits no distension and no mass. There is no tenderness. There is no rebound and no guarding.   Midline incision with reducible hernia   Musculoskeletal: Normal range of motion. He exhibits no edema.   Neurological: He is alert and oriented to person, " place, and time.   Skin: No rash noted. He is not diaphoretic. No erythema.   Psychiatric: He has a normal mood and affect.       US Gallballder 3/4/18: Images independently reviewed: distended gb with gallstones    ASSESSMENT/PLAN:        1. Calculus of gallbladder with chronic cholecystitis without obstruction  Case Request Operating Room: CHOLECYSTECTOMY-LAPAROSCOPIC   2. Cholecystitis         Plan:  Ramon Reilly has cholecystitis.  Plan for lap celia March 12 as he has an upcoming vacation.

## 2018-03-07 ENCOUNTER — HOSPITAL ENCOUNTER (OUTPATIENT)
Dept: PREADMISSION TESTING | Facility: HOSPITAL | Age: 78
Discharge: HOME | End: 2018-03-07
Attending: SURGERY
Payer: MEDICARE

## 2018-03-07 PROCEDURE — 99900103 DSU ONLY-NO CHARGE-INITIAL HR (STAT)

## 2018-03-09 ENCOUNTER — ANESTHESIA EVENT (OUTPATIENT)
Dept: SURGERY | Facility: HOSPITAL | Age: 78
End: 2018-03-09
Payer: MEDICARE

## 2018-03-12 ENCOUNTER — SURGERY (OUTPATIENT)
Age: 78
End: 2018-03-12

## 2018-03-12 ENCOUNTER — TELEPHONE (OUTPATIENT)
Dept: BARIATRICS | Facility: CLINIC | Age: 78
End: 2018-03-12

## 2018-03-12 ENCOUNTER — HOSPITAL ENCOUNTER (OUTPATIENT)
Facility: HOSPITAL | Age: 78
LOS: 1 days | Discharge: HOME | End: 2018-03-12
Attending: SURGERY | Admitting: SURGERY
Payer: MEDICARE

## 2018-03-12 ENCOUNTER — ANESTHESIA (OUTPATIENT)
Dept: SURGERY | Facility: HOSPITAL | Age: 78
End: 2018-03-12
Payer: MEDICARE

## 2018-03-12 DIAGNOSIS — R53.1 GENERAL WEAKNESS: ICD-10-CM

## 2018-03-12 DIAGNOSIS — E78.5 HYPERLIPIDEMIA, UNSPECIFIED HYPERLIPIDEMIA TYPE: ICD-10-CM

## 2018-03-12 DIAGNOSIS — M25.561 PAIN IN BOTH KNEES, UNSPECIFIED CHRONICITY: ICD-10-CM

## 2018-03-12 DIAGNOSIS — M25.562 PAIN IN BOTH KNEES, UNSPECIFIED CHRONICITY: ICD-10-CM

## 2018-03-12 DIAGNOSIS — R73.03 PREDIABETES: Primary | ICD-10-CM

## 2018-03-12 DIAGNOSIS — R53.1 WEAKNESS: ICD-10-CM

## 2018-03-12 DIAGNOSIS — K81.9 CHOLECYSTITIS: ICD-10-CM

## 2018-03-12 PROCEDURE — D9220A PRA ANESTHESIA: Mod: ANES,,, | Performed by: ANESTHESIOLOGY

## 2018-03-12 PROCEDURE — 63600175 PHARM REV CODE 636 W HCPCS: Performed by: NURSE ANESTHETIST, CERTIFIED REGISTERED

## 2018-03-12 PROCEDURE — 25000003 PHARM REV CODE 250: Performed by: ANESTHESIOLOGY

## 2018-03-12 PROCEDURE — 25000003 PHARM REV CODE 250: Performed by: SURGERY

## 2018-03-12 PROCEDURE — 99900103 DSU ONLY-NO CHARGE-INITIAL HR (STAT): Performed by: SURGERY

## 2018-03-12 PROCEDURE — 36000709 HC OR TIME LEV III EA ADD 15 MIN: Performed by: SURGERY

## 2018-03-12 PROCEDURE — 37000008 HC ANESTHESIA 1ST 15 MINUTES: Performed by: SURGERY

## 2018-03-12 PROCEDURE — D9220A PRA ANESTHESIA: Mod: CRNA,,, | Performed by: NURSE ANESTHETIST, CERTIFIED REGISTERED

## 2018-03-12 PROCEDURE — 99900104 DSU ONLY-NO CHARGE-EA ADD'L HR (STAT): Performed by: SURGERY

## 2018-03-12 PROCEDURE — 71000033 HC RECOVERY, INTIAL HOUR: Performed by: SURGERY

## 2018-03-12 PROCEDURE — 37000009 HC ANESTHESIA EA ADD 15 MINS: Performed by: SURGERY

## 2018-03-12 PROCEDURE — 27201423 OPTIME MED/SURG SUP & DEVICES STERILE SUPPLY: Performed by: SURGERY

## 2018-03-12 PROCEDURE — 36000708 HC OR TIME LEV III 1ST 15 MIN: Performed by: SURGERY

## 2018-03-12 PROCEDURE — 47562 LAPAROSCOPIC CHOLECYSTECTOMY: CPT | Mod: ,,, | Performed by: SURGERY

## 2018-03-12 PROCEDURE — 63600175 PHARM REV CODE 636 W HCPCS: Performed by: SURGERY

## 2018-03-12 PROCEDURE — 25000003 PHARM REV CODE 250: Performed by: NURSE ANESTHETIST, CERTIFIED REGISTERED

## 2018-03-12 PROCEDURE — 88304 TISSUE EXAM BY PATHOLOGIST: CPT | Performed by: PATHOLOGY

## 2018-03-12 PROCEDURE — 71000015 HC POSTOP RECOV 1ST HR: Performed by: SURGERY

## 2018-03-12 RX ORDER — LIDOCAINE HCL/PF 100 MG/5ML
SYRINGE (ML) INTRAVENOUS
Status: DISCONTINUED | OUTPATIENT
Start: 2018-03-12 | End: 2018-03-12

## 2018-03-12 RX ORDER — HYDROCODONE BITARTRATE AND ACETAMINOPHEN 5; 325 MG/1; MG/1
1 TABLET ORAL EVERY 4 HOURS PRN
Status: DISCONTINUED | OUTPATIENT
Start: 2018-03-12 | End: 2018-03-13 | Stop reason: HOSPADM

## 2018-03-12 RX ORDER — PROPOFOL 10 MG/ML
VIAL (ML) INTRAVENOUS
Status: DISCONTINUED | OUTPATIENT
Start: 2018-03-12 | End: 2018-03-12

## 2018-03-12 RX ORDER — SODIUM CHLORIDE, SODIUM LACTATE, POTASSIUM CHLORIDE, CALCIUM CHLORIDE 600; 310; 30; 20 MG/100ML; MG/100ML; MG/100ML; MG/100ML
INJECTION, SOLUTION INTRAVENOUS CONTINUOUS
Status: DISCONTINUED | OUTPATIENT
Start: 2018-03-12 | End: 2018-03-13 | Stop reason: HOSPADM

## 2018-03-12 RX ORDER — ROCURONIUM BROMIDE 10 MG/ML
INJECTION, SOLUTION INTRAVENOUS
Status: DISCONTINUED | OUTPATIENT
Start: 2018-03-12 | End: 2018-03-12

## 2018-03-12 RX ORDER — ONDANSETRON 2 MG/ML
4 INJECTION INTRAMUSCULAR; INTRAVENOUS ONCE AS NEEDED
Status: ACTIVE | OUTPATIENT
Start: 2018-03-12 | End: 2018-03-12

## 2018-03-12 RX ORDER — HYDROCODONE BITARTRATE AND ACETAMINOPHEN 5; 325 MG/1; MG/1
1 TABLET ORAL EVERY 4 HOURS PRN
Qty: 40 TABLET | Refills: 0 | Status: SHIPPED | OUTPATIENT
Start: 2018-03-12 | End: 2018-05-16

## 2018-03-12 RX ORDER — DEXAMETHASONE SODIUM PHOSPHATE 4 MG/ML
INJECTION, SOLUTION INTRA-ARTICULAR; INTRALESIONAL; INTRAMUSCULAR; INTRAVENOUS; SOFT TISSUE
Status: DISCONTINUED | OUTPATIENT
Start: 2018-03-12 | End: 2018-03-12

## 2018-03-12 RX ORDER — ONDANSETRON HYDROCHLORIDE 2 MG/ML
INJECTION, SOLUTION INTRAMUSCULAR; INTRAVENOUS
Status: DISCONTINUED | OUTPATIENT
Start: 2018-03-12 | End: 2018-03-12

## 2018-03-12 RX ORDER — NEOSTIGMINE METHYLSULFATE 1 MG/ML
INJECTION, SOLUTION INTRAVENOUS
Status: DISCONTINUED | OUTPATIENT
Start: 2018-03-12 | End: 2018-03-12

## 2018-03-12 RX ORDER — LIDOCAINE HYDROCHLORIDE 10 MG/ML
1 INJECTION, SOLUTION EPIDURAL; INFILTRATION; INTRACAUDAL; PERINEURAL ONCE
Status: COMPLETED | OUTPATIENT
Start: 2018-03-12 | End: 2018-03-12

## 2018-03-12 RX ORDER — OXYCODONE HYDROCHLORIDE 5 MG/1
5 TABLET ORAL ONCE AS NEEDED
Status: COMPLETED | OUTPATIENT
Start: 2018-03-12 | End: 2018-03-12

## 2018-03-12 RX ORDER — SUCCINYLCHOLINE CHLORIDE 20 MG/ML
INJECTION INTRAMUSCULAR; INTRAVENOUS
Status: DISCONTINUED | OUTPATIENT
Start: 2018-03-12 | End: 2018-03-12

## 2018-03-12 RX ORDER — ONDANSETRON 4 MG/1
4 TABLET, ORALLY DISINTEGRATING ORAL EVERY 6 HOURS PRN
Qty: 30 TABLET | Refills: 0 | Status: SHIPPED | OUTPATIENT
Start: 2018-03-12 | End: 2018-04-05

## 2018-03-12 RX ORDER — GLYCOPYRROLATE 0.2 MG/ML
INJECTION INTRAMUSCULAR; INTRAVENOUS
Status: DISCONTINUED | OUTPATIENT
Start: 2018-03-12 | End: 2018-03-12

## 2018-03-12 RX ORDER — FENTANYL CITRATE 50 UG/ML
INJECTION, SOLUTION INTRAMUSCULAR; INTRAVENOUS
Status: DISCONTINUED | OUTPATIENT
Start: 2018-03-12 | End: 2018-03-12

## 2018-03-12 RX ORDER — BUPIVACAINE HYDROCHLORIDE AND EPINEPHRINE 5; 5 MG/ML; UG/ML
INJECTION, SOLUTION EPIDURAL; INTRACAUDAL; PERINEURAL
Status: DISCONTINUED | OUTPATIENT
Start: 2018-03-12 | End: 2018-03-12 | Stop reason: HOSPADM

## 2018-03-12 RX ORDER — MIDAZOLAM HYDROCHLORIDE 1 MG/ML
INJECTION, SOLUTION INTRAMUSCULAR; INTRAVENOUS
Status: DISCONTINUED | OUTPATIENT
Start: 2018-03-12 | End: 2018-03-12

## 2018-03-12 RX ORDER — FENTANYL CITRATE 50 UG/ML
25 INJECTION, SOLUTION INTRAMUSCULAR; INTRAVENOUS EVERY 5 MIN PRN
Status: DISCONTINUED | OUTPATIENT
Start: 2018-03-12 | End: 2018-03-13 | Stop reason: HOSPADM

## 2018-03-12 RX ORDER — ONDANSETRON 2 MG/ML
4 INJECTION INTRAMUSCULAR; INTRAVENOUS EVERY 12 HOURS PRN
Status: DISCONTINUED | OUTPATIENT
Start: 2018-03-12 | End: 2018-03-13 | Stop reason: HOSPADM

## 2018-03-12 RX ADMIN — ONDANSETRON 4 MG: 2 INJECTION, SOLUTION INTRAMUSCULAR; INTRAVENOUS at 01:03

## 2018-03-12 RX ADMIN — NEOSTIGMINE METHYLSULFATE 3 MG: 1 INJECTION INTRAVENOUS at 02:03

## 2018-03-12 RX ADMIN — FENTANYL CITRATE 50 MCG: 50 INJECTION, SOLUTION INTRAMUSCULAR; INTRAVENOUS at 01:03

## 2018-03-12 RX ADMIN — DEXAMETHASONE SODIUM PHOSPHATE 4 MG: 4 INJECTION, SOLUTION INTRAMUSCULAR; INTRAVENOUS at 01:03

## 2018-03-12 RX ADMIN — PROPOFOL 150 MG: 10 INJECTION, EMULSION INTRAVENOUS at 01:03

## 2018-03-12 RX ADMIN — SODIUM CHLORIDE, SODIUM GLUCONATE, SODIUM ACETATE, POTASSIUM CHLORIDE, MAGNESIUM CHLORIDE, SODIUM PHOSPHATE, DIBASIC, AND POTASSIUM PHOSPHATE: .53; .5; .37; .037; .03; .012; .00082 INJECTION, SOLUTION INTRAVENOUS at 10:03

## 2018-03-12 RX ADMIN — OXYCODONE HYDROCHLORIDE 5 MG: 5 TABLET ORAL at 02:03

## 2018-03-12 RX ADMIN — ROCURONIUM BROMIDE 5 MG: 10 INJECTION, SOLUTION INTRAVENOUS at 01:03

## 2018-03-12 RX ADMIN — BUPIVACAINE HYDROCHLORIDE AND EPINEPHRINE BITARTRATE 30 ML: 5; .0091 INJECTION, SOLUTION EPIDURAL; INTRACAUDAL; PERINEURAL at 02:03

## 2018-03-12 RX ADMIN — GLYCOPYRROLATE 0.4 MG: 0.2 INJECTION, SOLUTION INTRAMUSCULAR; INTRAVENOUS at 02:03

## 2018-03-12 RX ADMIN — SODIUM CHLORIDE, SODIUM LACTATE, POTASSIUM CHLORIDE, AND CALCIUM CHLORIDE: .6; .31; .03; .02 INJECTION, SOLUTION INTRAVENOUS at 02:03

## 2018-03-12 RX ADMIN — SODIUM CHLORIDE, SODIUM LACTATE, POTASSIUM CHLORIDE, AND CALCIUM CHLORIDE: .6; .31; .03; .02 INJECTION, SOLUTION INTRAVENOUS at 01:03

## 2018-03-12 RX ADMIN — SUCCINYLCHOLINE CHLORIDE 140 MG: 20 INJECTION, SOLUTION INTRAMUSCULAR; INTRAVENOUS at 01:03

## 2018-03-12 RX ADMIN — LIDOCAINE HYDROCHLORIDE: 10 INJECTION, SOLUTION EPIDURAL; INFILTRATION; INTRACAUDAL; PERINEURAL at 10:03

## 2018-03-12 RX ADMIN — DEXTROSE 3 G: 50 INJECTION, SOLUTION INTRAVENOUS at 01:03

## 2018-03-12 RX ADMIN — LIDOCAINE HYDROCHLORIDE 100 MG: 20 INJECTION, SOLUTION INTRAVENOUS at 01:03

## 2018-03-12 NOTE — OP NOTE
[facility]  Cholecystectomy  Procedure Note    SUMMARY     Date of Procedure: 3/12/2018     Procedure: lap celia    Provider: Skyler Mcgovern MD    Assisting Provider: none    Indications: This patient presents with symptomatic gallbladder disease and will undergo laparoscopic cholecystectomy.    Pre-Operative Diagnosis: Calculus of gallbladder with other cholecystitis, without mention of obstruction    Post-Operative Diagnosis: Same    Anesthesia: GETA    Technical Procedures Used: lap     Description of the Findings of the Procedure:     The patient was seen again in the Holding Room. The risks, benefits, complications, treatment options, and expected outcomes were discussed with the patient. The possibilities of reaction to medication, pulmonary aspiration, perforation of viscus, bleeding, recurrent infection, finding a normal gallbladder, the need for additional procedures, failure to diagnose a condition, the possible need to convert to an open procedure, and creating a complication requiring transfusion or operation were discussed with the patient. The patient and/or family concurred with the proposed plan, giving informed consent. The site of surgery properly noted/marked. The patient was taken to Operating Room, identified as Ramon Reilly and the procedure verified as Laparoscopic Cholecystectomy with Intraoperative Cholangiograms. A Time Out was held and the above information confirmed.    Prior to the induction of general anesthesia, antibiotic prophylaxis was administered. General endotracheal anesthesia was then administered and tolerated well. After the induction, the abdomen was prepped in the usual sterile fashion. The patient was positioned in the supine position with the left arm comfortably tucked, along with some reverse Trendelenburg.      Local anesthetic agent was injected into the skin near the right upper quadrant and an incision made. 5 mm optiview trocar was  used to enter safely into the peritoneal cavity.  Pneumoperitoneum was then created with CO2 and tolerated well without any adverse changes in the patient's vital signs. Additional trocars were introduced under direct vision. All skin incisions were infiltrated with a local anesthetic agent before making the incision and placing the trocars. Pneumoperitoneum was then created with CO2 and tolerated well without any adverse changes in the patient's vital signs. Additional trocars were introduced under direct vision. All skin incisions were infiltrated with a local anesthetic agent before making the incision and placing the trocars.     The gallbladder was identified, the fundus grasped and retracted cephalad. Adhesions were lysed bluntly and with the electrocautery where indicated, taking care not to injure any adjacent organs or viscus. The infundibulum was grasped and retracted laterally, exposing the peritoneum overlying the triangle of Calot. This was then divided and exposed in a blunt fashion. The cystic duct was clearly identified and bluntly dissected circumferentially. The junctions of the gallbladder, cystic duct and common bile duct were clearly identified prior to the division of any linear structure and photo documented.     The cystic duct was then doubly ligated with surgical clips on the patient side and singly clipped on the gallbladder side and divided. The cystic artery was identified, dissected free, ligated with clips and divided as well.     The gallbladder was dissected from the liver bed in retrograde fashion with the electrocautery. The gallbladder was removed. The liver bed was irrigated and inspected. Hemostasis was achieved with the electrocautery. Copious irrigation was utilized and was repeatedly aspirated until clear all particulate matter.    Pneumoperitoneum was completely reduced after viewing removal of the trocars under direct vision. The wound was thoroughly irrigated and the  fascia was then closed with a figure of eight suture; the skin was then closed with monocryl and a sterile dressing was applied.    Instrument, sponge, and needle counts were correct at closure and at the conclusion of the case.     Cholecystitis with Cholelithiasis           Significant Surgical Tasks Conducted by the Assistant(s), if Applicable: none    Complications: None; patient tolerated the procedure well.    Total IV Fluids: 100 mL    Estimated Blood Loss (EBL): 10.0 cc     Drains: none           Implants: none    Specimens: Gallbladder           Condition: stable    Disposition: PACU - hemodynamically stable.    Attestation: I was present and scrubbed for the entire procedure.

## 2018-03-12 NOTE — BRIEF OP NOTE
[facility]  Brief Operative Note     SUMMARY     Surgery Date: 3/12/2018     Surgeon(s) and Role:     * Skyler Mcgovern MD - Primary    Assisting Surgeon: None    Pre-op Diagnosis:  Calculus of gallbladder with chronic cholecystitis without obstruction [K80.10]    Post-op Diagnosis:  Post-Op Diagnosis Codes:     * Calculus of gallbladder with chronic cholecystitis without obstruction [K80.10]    Procedure(s) (LRB):  CHOLECYSTECTOMY-LAPAROSCOPIC (N/A)    Anesthesia: General    Description of the findings of the procedure: lap chol    Findings/Key Components: normal anatomy    Estimated Blood Loss: 15.0 cc         Specimens:   Specimen (12h ago through future)    Start     Ordered    03/12/18 1405  Specimen to Pathology - Surgery  Once     Comments:  Pre-op Diagnosis: Calculus of gallbladder with chronic cholecystitis without obstruction [K80.10]Post-op Diagnosis: same Procedure(s):CHOLECYSTECTOMY-LAPAROSCOPIC Number of specimens: 1Name of specimens: gallbladder      03/12/18 1404          Discharge Note    SUMMARY     Admit Date: 3/12/2018    Discharge Date and Time:  03/12/2018 2:31 PM    Hospital Course (synopsis of major diagnoses, care, treatment, and services provided during the course of the hospital stay): Did well post op, tolerated diet and discharged home     Final Diagnosis: Post-Op Diagnosis Codes:     * Calculus of gallbladder with chronic cholecystitis without obstruction [K80.10]    Disposition: Home or Self Care    Follow Up/Patient Instructions:     Medications:  Reconciled Home Medications:   Current Discharge Medication List      START taking these medications    Details   hydrocodone-acetaminophen 5-325mg (NORCO) 5-325 mg per tablet Take 1 tablet by mouth every 4 (four) hours as needed for Pain.  Qty: 40 tablet, Refills: 0      ondansetron (ZOFRAN-ODT) 4 MG TbDL Take 1 tablet (4 mg total) by mouth every 6 (six) hours as needed.  Qty: 30 tablet, Refills: 0         CONTINUE  these medications which have NOT CHANGED    Details   CALCIUM CARBONATE/VITAMIN D3 (VITAMIN D-3 ORAL) Take by mouth once daily.      omeprazole (PRILOSEC) 20 MG capsule Take 20 mg by mouth nightly.       tamsulosin (FLOMAX) 0.4 mg Cp24 Take 1 capsule (0.4 mg total) by mouth once daily.  Qty: 30 capsule, Refills: 1    Associated Diagnoses: Benign prostatic hyperplasia, unspecified whether lower urinary tract symptoms present      vitamin D 1000 units Tab Take 2,000 Units by mouth once daily.      aspirin (ECOTRIN) 81 MG EC tablet Take 81 mg by mouth once daily.             Discharge Procedure Orders  Diet general     Activity as tolerated     Remove dressing in 48 hours       Follow-up Information     Skyler Mcgovern MD. Call in 2 weeks.    Specialties:  General Surgery, Bariatrics, Surgery  Contact information:  [address] [address] [phone]

## 2018-03-12 NOTE — DISCHARGE INSTRUCTIONS
General Information:    1.  Do not drink alcoholic beverages including beer for 24 hours or as long as you are on pain medication..  2.  Do not drive a motor vehicle, operate machinery or power tools, or signs legal papers for 24 hours or as long as you are on pain medication.   3.  You may experience light-headedness, dizziness, and sleepiness following surgery. Please do not stay alone. A responsible adult should be with you for this 24 hour period.  4.  Go home and rest.    5. Progress slowly to a normal diet unless instructed.  Otherwise, begin with liquids such as soft drinks, then soup and crackers working up to solid foods. Drink plenty of nonalcoholic fluids.  6.  Certain anesthetics and pain medications produce nausea and vomiting in certain       individuals. If nausea becomes a problem at home, call you doctor.    7. A nurse will be calling you sometime after surgery. Do not be alarmed. This is our way of finding out how you are doing.    8. Several times every hour while you are awake, take 2-3 deep breaths and cough. If you had stomach surgery hold a pillow or rolled towel firmly against your stomach before you cough. This will help with any pain the cough might cause.  9. Several times every hour while you are awake, pump and flex your feet 5-6 times and do foot circles. This will help prevent blood clots.    10.Call your doctor for severe pain, bleeding, fever, or signs or symptoms of infection (pain, swelling, redness, foul odor, drainage).    Discharge Instructions: After Your Surgery/Procedure  Youve just had surgery. During surgery you were given medicine called anesthesia to keep you relaxed and free of pain. After surgery you may have some pain or nausea. This is common. Here are some tips for feeling better and getting well after surgery.     Stay on schedule with your medication.   Going home  Your doctor or nurse will show you how to take care of yourself when you go home. He or she will  "also answer your questions. Have an adult family member or friend drive you home.      For your safety we recommend these precaution for the first 24 hours after your procedure:  · Do not drive or use heavy equipment.  · Do not make important decisions or sign legal papers.  · Do not drink alcohol.  · Have someone stay with you, if needed. He or she can watch for problems and help keep you safe.  · Your concentration, balance, coordination, and judgement may be impaired for many hours after anesthesia.  Use caution when ambulating or standing up.     · You may feel weak and "washed out" after anesthesia and surgery.      Subtle residual effects of general anesthesia or sedation with regional / local anesthesia can last more than 24 hours.  Rest for the remainder of the day or longer if your Doctor/Surgeon has advised you to do so.  Although you may feel normal within the first 24 hours, your reflexes and mental ability may be impaired without you realizing it.  You may feel dizzy, lightheaded or sleepy for 24 hours or longer.      Be sure to go to all follow-up visits with your doctor. And rest after your surgery for as long as your doctor tells you to.  Coping with pain  If you have pain after surgery, pain medicine will help you feel better. Take it as told, before pain becomes severe. Also, ask your doctor or pharmacist about other ways to control pain. This might be with heat, ice, or relaxation. And follow any other instructions your surgeon or nurse gives you.  Tips for taking pain medicine  To get the best relief possible, remember these points:  · Pain medicines can upset your stomach. Taking them with a little food may help.  · Most pain relievers taken by mouth need at least 20 to 30 minutes to start to work.  · Taking medicine on a schedule can help you remember to take it. Try to time your medicine so that you can take it before starting an activity. This might be before you get dressed, go for a walk, " or sit down for dinner.  · Constipation is a common side effect of pain medicines. Call your doctor before taking any medicines such as laxatives or stool softeners to help ease constipation. Also ask if you should skip any foods. Drinking lots of fluids and eating foods such as fruits and vegetables that are high in fiber can also help. Remember, do not take laxatives unless your surgeon has prescribed them.  · Drinking alcohol and taking pain medicine can cause dizziness and slow your breathing. It can even be deadly. Do not drink alcohol while taking pain medicine.  · Pain medicine can make you react more slowly to things. Do not drive or run machinery while taking pain medicine.  Your health care provider may tell you to take acetaminophen to help ease your pain. Ask him or her how much you are supposed to take each day. Acetaminophen or other pain relievers may interact with your prescription medicines or other over-the-counter (OTC) drugs. Some prescription medicines have acetaminophen and other ingredients. Using both prescription and OTC acetaminophen for pain can cause you to overdose. Read the labels on your OTC medicines with care. This will help you to clearly know the list of ingredients, how much to take, and any warnings. It may also help you not take too much acetaminophen. If you have questions or do not understand the information, ask your pharmacist or health care provider to explain it to you before you take the OTC medicine.  Managing nausea  Some people have an upset stomach after surgery. This is often because of anesthesia, pain, or pain medicine, or the stress of surgery. These tips will help you handle nausea and eat healthy foods as you get better. If you were on a special food plan before surgery, ask your doctor if you should follow it while you get better. These tips may help:  · Do not push yourself to eat. Your body will tell you when to eat and how much.  · Start off with clear  liquids and soup. They are easier to digest.  · Next try semi-solid foods, such as mashed potatoes, applesauce, and gelatin, as you feel ready.  · Slowly move to solid foods. Dont eat fatty, rich, or spicy foods at first.  · Do not force yourself to have 3 large meals a day. Instead eat smaller amounts more often.  · Take pain medicines with a small amount of solid food, such as crackers or toast, to avoid nausea.     Call your surgeon if  · You still have pain an hour after taking medicine. The medicine may not be strong enough.  · You feel too sleepy, dizzy, or groggy. The medicine may be too strong.  · You have side effects like nausea, vomiting, or skin changes, such as rash, itching, or hives.       If you have obstructive sleep apnea  You were given anesthesia medicine during surgery to keep you comfortable and free of pain. After surgery, you may have more apnea spells because of this medicine and other medicines you were given. The spells may last longer than usual.   At home:  · Keep using the continuous positive airway pressure (CPAP) device when you sleep. Unless your health care provider tells you not to, use it when you sleep, day or night. CPAP is a common device used to treat obstructive sleep apnea.  · Talk with your provider before taking any pain medicine, muscle relaxants, or sedatives. Your provider will tell you about the possible dangers of taking these medicines.  © 0869-7520 [employer]. [address] [place] [address]. All rights reserved. This information is not intended as a substitute for professional medical care. Always follow your healthcare professional's instructions.  Post op instructions for prevention of DVT  What is deep vein thrombosis?  Deep vein thrombosis (DVT) is the medical term for blood clots in the deep veins of the leg.  These blood clots can be dangerous.  A DVT can block a blood vessel and keep blood from getting where it needs to go.   Another problem is that the clot can travel to other parts of the body such as the lungs.  A clot that travels to the lungs is called a pulmonary embolus (PE) and can cause serious problems with breathing which can lead to death.  Am I at risk for DVT/PE?  If you are not very active, you are at risk of DVT.  Anyone confined to bed, sitting for long periods of time, recovering from surgery, etc. increases the risk of DVT.  Other risk factors are cancer diagnosis, certain medications, estrogen replacement in any form,older age, obesity, pregnancy, smoking, history of clotting disorders, and dehydration.  How will I know if I have a DVT?   Swelling in the lower leg   Pain   Warmth, redness, hardness or bulging of the vein  If you have any of these symptoms, call your doctors office right away.  Some people will not have any symptoms until the clot moves to the lungs.  What are the symptoms of a PE?   Panting, shortness of breath, or trouble breathing   Sharp, knife-like chest pain when you breathe   Coughing or coughing up blood   Rapid heartbeat  If you have any of these symptoms or get worse quickly, call 911 for emergency treatment.  How can I prevent a DVT?   Avoid long periods of inactivity and dont cross your legs--get up and walk around every hour or so.   Stay active--walking after surgery is highly encouraged.  This means you should get out of the house and walk in the neighborhood.  Going up and down stairs will not impair healing (unless advised against such activity by your doctor).     Drink plenty of noncaffeinated, nonalcoholic fluids each day to prevent dehydration.   Wear special support stockings, if they have been advised by your doctor.   If you travel, stop at least once an hour and walk around.   Avoid smoking (assistance with stopping is available through your healthcare provider)  Always notify your doctor if you are not able to follow the post operative instructions that are  given to you at the time of discharge.  It may be necessary to prescribe one of the medications available to prevent DVT.Using Opioids for Pain Management     Your doctor has given instructions for you to take an opioid.  This is a drug for bad pain.  It helps control pain without causing bleeding and kidney problems.  Common opioid names are morphine, hydromorphone, oxycodone, and methadone. These drugs are called narcotics.    There are several safety concerns you need to know.     · It is against the law to give or sell this drug to another person.  You must keep this medicine safely locked.    · You may have side effects from taking this medication.  These include nausea, itching, sweating, sleepiness, a change in your ability to breathe, and depression.  · Do not take alcohol or sleeping pills opioids.    · Long-term opoid use may no longer giver you relief from pain.  It can cause you stomach pain, mental anxiety, and headaches.  Long-term opoid use can potentially lead to unlawful street drug abuse and reduce your ability to stay employed.    · Your body may become opioid tolerant if you need to take more to get relief.    · You must stop taking opioids if you begin having more pain as a result of the medicine.    · Opioid withdrawal occurs when you have to stop taking the drug.  It can cause you to have nausea, vomiting, diarrhea, stomach pain, anxiety, and dilated pupils in your eyes. This condition means you are opioid dependent.    · Addiction is a drug induced brain disease. It means there are changes in how your brain is working.  Children, teens, and young adults under 25 years old are more likely to get addicted to opioids.      · Addiction can happen with repeated opioid use.  It does not happen with short-term use of two weeks or less.       For more information, please speak with your doctor or pharmacist.        Discharge Instructions for Laparoscopic Cholecystectomy  You have had a procedure  known as a laparoscopic cholecystectomy. A laparoscopic cholecystectomy is a procedure to remove your gallbladder. People who have this procedure usually recover more quickly and have less pain than with open gallbladder surgery (called open cholecystectomy). Many surgeons recommend a low-fat diet, avoiding fried food in particular, for the first month after surgery.   You can live a full and healthy life without your gallbladder. This includes eating the foods and doing the things you enjoyed before your gallbladder problems started.  Home care  Recommendations for home care include the following:   · Ask someone to drive you to your appointments for the next 3 days. Dont drive until you are no longer taking pain medicine and are able to step on the brake pedal without hesitation.   · Wash the skin around your incision daily with mild soap and water. It's OK to shower the day after your surgery.  · Eat your regular diet. It is wise to stay away from rich, greasy, or spicy food for a few days.  · Remember, it takes at least 1 week for you to get most of your strength and energy back.  · Make an office visit to talk to your healthcare provider if the following symptoms dont go away within a week after your surgery:  ¨ Fatigue  ¨ Pain around the incision  ¨ Diarrhea or constipation  ¨ Loss of appetite     When to call your healthcare provider  Call your healthcare provider immediately if you have any of the following:  · Yellowing of your eyes or skin (jaundice)  · Chills  · Fever of 100.4°F (38.0°C) or higher, or as directed by your healthcare provider   · Redness, swelling, increasing pain, pus, or a foul smell at the incision site  · Dark or rust-colored urine  · Stool that is curry-colored or light in color instead of brown  · Increasing belly pain  · Rectal bleeding  · Leg swelling or shortness of breath   Date Last Reviewed: 7/1/2016  © 5246-7242 [employer]. [address] [place] [address], [facility] [place]  [address]. All rights reserved. This information is not intended as a substitute for professional medical care. Always follow your healthcare professional's instructions.      We hope your stay was comfortable as you heal now, mend and rest.    For we have enjoyed taking care of you by giving your our best.    And as you get better, by regaining your health and strength;   We count it as a privilege to have served you and hope your time at [facility] was well spent.      Thank  You!!!

## 2018-03-12 NOTE — TRANSFER OF CARE
"Anesthesia Transfer of Care Note    Patient: Ramon Reilly    Procedure(s) Performed: Procedure(s) (LRB):  CHOLECYSTECTOMY-LAPAROSCOPIC (N/A)    Patient location: PACU    Anesthesia Type: general    Transport from OR: Transported from OR on 2-3 L/min O2 by NC with adequate spontaneous ventilation    Post pain: adequate analgesia    Post assessment: no apparent anesthetic complications    Post vital signs: stable    Level of consciousness: awake, alert and oriented    Nausea/Vomiting: no nausea/vomiting    Complications: none    Transfer of care protocol was followed      Last vitals:   Visit Vitals  BP (!) 164/77   Pulse 79   Temp 36.7 °C (98.1 °F)   Ht 5' 10" (1.778 m)   Wt 121.1 kg (267 lb)   SpO2 97%   BMI 38.31 kg/m²     "

## 2018-03-12 NOTE — ANESTHESIA PREPROCEDURE EVALUATION
03/12/2018  Ramon Reilly is a 77 y.o., male.    Anesthesia Evaluation    I have reviewed the Patient Summary Reports.    I have reviewed the Nursing Notes.      Review of Systems  Anesthesia Hx:  No problems with previous Anesthesia        Physical Exam  General:  Obesity                 Anesthesia Plan  Type of Anesthesia, risks & benefits discussed:  Anesthesia Type:  general  Patient's Preference:   Intra-op Monitoring Plan:   Intra-op Monitoring Plan Comments:   Post Op Pain Control Plan:   Post Op Pain Control Plan Comments:   Induction:   IV  Beta Blocker:  Patient is not currently on a Beta-Blocker (No further documentation required).       Informed Consent: Patient understands risks and agrees with Anesthesia plan.  Questions answered. Anesthesia consent signed with patient.  ASA Score: 2     Day of Surgery Review of History & Physical:    H&P update referred to the surgeon.         Ready For Surgery From Anesthesia Perspective.

## 2018-03-12 NOTE — TELEPHONE ENCOUNTER
----- Message from Jerome Whipple sent at 3/12/2018  3:02 PM CDT -----  Contact: Mason Warner patient's wife called requesting post op appointment within two weeks preferably on 03/30/18 call back at  [phone],tried to book appointment for general surgery system wouldn't allow it. Thanks,

## 2018-03-12 NOTE — ANESTHESIA POSTPROCEDURE EVALUATION
"Anesthesia Post Evaluation    Patient: Ramon Reilly    Procedure(s) Performed: Procedure(s) (LRB):  CHOLECYSTECTOMY-LAPAROSCOPIC (N/A)    Final Anesthesia Type: general  Patient location during evaluation: PACU  Patient participation: Yes- Able to Participate  Level of consciousness: awake and alert  Post-procedure vital signs: reviewed and stable  Pain management: adequate  Airway patency: patent  PONV status at discharge: No PONV  Anesthetic complications: no      Cardiovascular status: blood pressure returned to baseline and hemodynamically stable  Respiratory status: unassisted  Hydration status: euvolemic  Follow-up not needed.        Visit Vitals  BP (!) 143/63   Pulse (!) 56   Temp 36.3 °C (97.3 °F) (Skin)   Resp 17   Ht 5' 10" (1.778 m)   Wt 121.1 kg (267 lb)   SpO2 (!) 94%   BMI 38.31 kg/m²       Pain/Triston Score: Presence of Pain: denies (3/12/2018 10:37 AM)      "

## 2018-03-12 NOTE — PLAN OF CARE
Pt aaox4, denies pain and nausea, tolerating clear liquids, vss, dressing cdi, released by anesthesia to transfer to postop

## 2018-03-12 NOTE — PLAN OF CARE
Vs stable.  Pain tolerable.  Voided.  Tolerating po fluids.  Will discharge after discharge instructions completed if continues to meet criteria.

## 2018-03-13 VITALS
SYSTOLIC BLOOD PRESSURE: 164 MMHG | TEMPERATURE: 98 F | HEART RATE: 58 BPM | BODY MASS INDEX: 38.22 KG/M2 | WEIGHT: 267 LBS | DIASTOLIC BLOOD PRESSURE: 77 MMHG | OXYGEN SATURATION: 97 % | HEIGHT: 70 IN | RESPIRATION RATE: 20 BRPM

## 2018-03-19 ENCOUNTER — OFFICE VISIT (OUTPATIENT)
Dept: UROLOGY | Facility: CLINIC | Age: 78
End: 2018-03-19
Payer: MEDICARE

## 2018-03-19 VITALS
BODY MASS INDEX: 36.85 KG/M2 | DIASTOLIC BLOOD PRESSURE: 74 MMHG | TEMPERATURE: 98 F | WEIGHT: 263.25 LBS | RESPIRATION RATE: 18 BRPM | HEART RATE: 75 BPM | HEIGHT: 71 IN | SYSTOLIC BLOOD PRESSURE: 136 MMHG

## 2018-03-19 DIAGNOSIS — R97.20 ELEVATED PROSTATE SPECIFIC ANTIGEN (PSA): Primary | ICD-10-CM

## 2018-03-19 LAB
BILIRUB SERPL-MCNC: ABNORMAL MG/DL
BLOOD URINE, POC: ABNORMAL
COLOR, POC UA: YELLOW
GLUCOSE UR QL STRIP: ABNORMAL
KETONES UR QL STRIP: ABNORMAL
LEUKOCYTE ESTERASE URINE, POC: ABNORMAL
NITRITE, POC UA: ABNORMAL
PH, POC UA: 6
PROTEIN, POC: ABNORMAL
SPECIFIC GRAVITY, POC UA: 1.01
UROBILINOGEN, POC UA: ABNORMAL

## 2018-03-19 PROCEDURE — 99213 OFFICE O/P EST LOW 20 MIN: CPT | Mod: 25,S$GLB,, | Performed by: UROLOGY

## 2018-03-19 PROCEDURE — 99999 PR PBB SHADOW E&M-EST. PATIENT-LVL III: CPT | Mod: PBBFAC,,, | Performed by: UROLOGY

## 2018-03-19 PROCEDURE — 81002 URINALYSIS NONAUTO W/O SCOPE: CPT | Mod: S$GLB,,, | Performed by: UROLOGY

## 2018-03-19 NOTE — PROGRESS NOTES
CHIEF COMPLAINT:  Enlarged prostate, elevated PSA.    Mr. Reilly is a 77-year-old male initially seen by Ms. Thalia Ortega.  The   patient was found to have an elevated PSA of 9.7 on 01/30/2018.  He also has   significant low urinary tract symptoms.  The patient is taking Flomax and he   refers that most of his symptoms have improved.  At the present time, he has   nocturia x2-3.  Denies any dysuria.  He refers that he is not sure that he does   not have hematuria.  The urine has a strong color.  The flow seems to be   adequate and he feels that he empties the bladder satisfactory despite the   significant urinary frequency that he has.  This patient's PSA was 9.7 on   January 2018.  The urinalysis is within normal limits.  Today, I had a lengthy   discussion with him regarding his condition and offered him 2 different course   that we can take.  One is to do an MRI of the prostate to determine if there is   any lesion that needs to be biopsied and the other will be to go ahead with   biopsies of the prostate.  The patient agreed to proceed with MRI of the   prostate.  The orders has been given.  It seems to me that the patient has no   joint prosthesis or stents, then he should be okay for the MRI.  The only   problem is this patient is overweight with a weight of 263 pounds and hopefully   will fit through the MRI machine for his prostate MRI.  He wife were fully   informed of the possibility that the patient may need to proceed after that with   the transrectal prostate ultrasound and biopsies if there is any significant   evidence of a lesion within the prostate gland.  All the questions were answered   at his satisfaction and he is going to be called when the MRI results becomes   available.      EOR/HN  dd: 03/19/2018 16:03:25 (CDT)  td: 03/20/2018 03:12:08 (CDT)  Doc ID   #[id number]  Job ID #[id number]    CC:     I spent 30 minutes with Mr. Reilly and all the time was spend on counseling.

## 2018-03-26 ENCOUNTER — TELEPHONE (OUTPATIENT)
Dept: UROLOGY | Facility: CLINIC | Age: 78
End: 2018-03-26

## 2018-03-26 NOTE — TELEPHONE ENCOUNTER
Left message for patient to call back.  Will need to get clearance to hold [facility] for 3 days.  Need the name of his cardiologist.

## 2018-03-29 DIAGNOSIS — N40.0 BENIGN PROSTATIC HYPERPLASIA, UNSPECIFIED WHETHER LOWER URINARY TRACT SYMPTOMS PRESENT: ICD-10-CM

## 2018-04-02 RX ORDER — TAMSULOSIN HYDROCHLORIDE 0.4 MG/1
CAPSULE ORAL
Qty: 30 CAPSULE | Refills: 0 | Status: SHIPPED | OUTPATIENT
Start: 2018-04-02 | End: 2018-05-02 | Stop reason: SDUPTHER

## 2018-04-03 ENCOUNTER — OFFICE VISIT (OUTPATIENT)
Dept: BARIATRICS | Facility: CLINIC | Age: 78
End: 2018-04-03
Payer: MEDICARE

## 2018-04-03 VITALS
TEMPERATURE: 98 F | WEIGHT: 265 LBS | HEART RATE: 60 BPM | BODY MASS INDEX: 37.94 KG/M2 | RESPIRATION RATE: 16 BRPM | HEIGHT: 70 IN | SYSTOLIC BLOOD PRESSURE: 160 MMHG | DIASTOLIC BLOOD PRESSURE: 78 MMHG

## 2018-04-03 DIAGNOSIS — K80.00 CALCULUS OF GALLBLADDER WITH ACUTE CHOLECYSTITIS WITHOUT OBSTRUCTION: Primary | ICD-10-CM

## 2018-04-03 PROCEDURE — 99999 PR PBB SHADOW E&M-EST. PATIENT-LVL IV: CPT | Mod: PBBFAC,,, | Performed by: SURGERY

## 2018-04-03 PROCEDURE — 99024 POSTOP FOLLOW-UP VISIT: CPT | Mod: S$GLB,,, | Performed by: SURGERY

## 2018-04-03 NOTE — PROGRESS NOTES
Doing well no complaints    Has incisional hernia for a surgery long ago, my incision are well healed    A/P  Sp lap celia  Follow up prn  Hernia repair as per Dr. Paiz when patient is ready.

## 2018-04-05 ENCOUNTER — INITIAL CONSULT (OUTPATIENT)
Dept: DERMATOLOGY | Facility: CLINIC | Age: 78
End: 2018-04-05
Payer: MEDICARE

## 2018-04-05 VITALS — WEIGHT: 264 LBS | HEIGHT: 70 IN | BODY MASS INDEX: 37.8 KG/M2

## 2018-04-05 DIAGNOSIS — D48.9 NEOPLASM OF UNCERTAIN BEHAVIOR: Primary | ICD-10-CM

## 2018-04-05 DIAGNOSIS — L82.0 INFLAMED SEBORRHEIC KERATOSIS: ICD-10-CM

## 2018-04-05 DIAGNOSIS — L81.4 SOLAR LENTIGO: ICD-10-CM

## 2018-04-05 DIAGNOSIS — L82.1 SEBORRHEIC KERATOSES: ICD-10-CM

## 2018-04-05 DIAGNOSIS — L57.0 ACTINIC KERATOSES: ICD-10-CM

## 2018-04-05 PROCEDURE — 11100 PR BIOPSY OF SKIN LESION: CPT | Mod: 59,S$GLB,, | Performed by: DERMATOLOGY

## 2018-04-05 PROCEDURE — 17110 DESTRUCTION B9 LES UP TO 14: CPT | Mod: S$GLB,,, | Performed by: DERMATOLOGY

## 2018-04-05 PROCEDURE — 88305 TISSUE EXAM BY PATHOLOGIST: CPT | Performed by: PATHOLOGY

## 2018-04-05 PROCEDURE — 17003 DESTRUCT PREMALG LES 2-14: CPT | Mod: S$GLB,,, | Performed by: DERMATOLOGY

## 2018-04-05 PROCEDURE — 17000 DESTRUCT PREMALG LESION: CPT | Mod: 59,S$GLB,, | Performed by: DERMATOLOGY

## 2018-04-05 PROCEDURE — 99203 OFFICE O/P NEW LOW 30 MIN: CPT | Mod: 25,S$GLB,, | Performed by: DERMATOLOGY

## 2018-04-05 PROCEDURE — 99999 PR PBB SHADOW E&M-EST. PATIENT-LVL III: CPT | Mod: PBBFAC,,, | Performed by: DERMATOLOGY

## 2018-04-05 NOTE — LETTER
April 5, 2018      Antwan Fontana MD  [address]  [place] [address]           [place] [facility]  [address]  [place] [address]  Phone: [phone]          Patient: Ramon Reilly   MR Number: [id number]   YOB: 1940   Date of Visit: 4/5/2018       Dear Dr. Antwan Fontana:    Thank you for referring Ramon Reilly to me for evaluation. Attached you will find relevant portions of my assessment and plan of care.    If you have questions, please do not hesitate to call me. I look forward to following Ramon Reilly along with you.    Sincerely,    Estefani Nye MD    Enclosure  CC:  No Recipients    If you would like to receive this communication electronically, please contact [email or web address] or [phone] to request more information on [employer] access.    For providers and/or their staff who would like to refer a patient to [facility], please contact us through our one-stop-shop provider referral line, [facility] , at [phone].    If you feel you have received this communication in error or would no longer like to receive these types of communications, please e-mail [email or web address]

## 2018-04-05 NOTE — PATIENT INSTRUCTIONS
Shave Biopsy Wound Care    Your doctor has performed a shave biopsy today.  A band aid and vaseline ointment has been placed over the site.  This should remain in place for 24 hours.  It is recommended that you keep the area dry for the first 24 hours.  After 24 hours, you may remove the band aid and wash the area with warm soap and water and apply Vaseline jelly.  Many patients prefer to use Neosporin or Bacitracin ointment.  This is acceptable; however, know that you can develop an allergy to this medication even if you have used it safely for years.  It is important to keep the area moist.  Letting it dry out and get air slows healing time, and will worsen the scar.  Band aid is optional after first 24 hours.      If you notice increasing redness, tenderness, pain, or yellow drainage at the biopsy site, please notify your doctor.  These are signs of an infection.    If your biopsy site is bleeding, apply firm pressure for 15 minutes straight.  Repeat for another 15 minutes, if it is still bleeding.   If the surgical site continues to bleed, then please contact your doctor.       [facility]  [facility]  [address]  [place] [address]  Dept: [phone]       CRYOSURGERY      Your doctor has used a method called cryosurgery to treat your skin condition. Cryosurgery refers to the use of very cold substances to treat a variety of skin conditions such as warts, pre-skin cancers, molluscum contagiosum, sun spots, and several benign growths. The substance we use in cryosurgery is liquid nitrogen and is so cold (-195 degrees Celsius) that is burns when administered.     Following treatment in the office, the skin may immediately burn and become red. You may find the area around the lesion is affected as well. It is sometimes necessary to treat not only the lesion, but a small area of the surrounding normal skin to achieve a good response.     A blister, and even a blood filled blister,  may form after treatment.   This is a normal response. If the blister is painful, it is acceptable to sterilize a needle and with rubbing alcohol and gently pop the blister. It is important that you gently wash the area with soap and warm water as the blister fluid may contain wart virus if a wart was treated. Do no remove the roof of the blister.     The area treated can take anywhere from 1-3 weeks to heal. Healing time depends on the kind skin lesion treated, the location, and how aggressively the lesion was treated. It is recommended that the areas treated are covered with Vaseline or bacitracin ointment and a band-aid. If a band-aid is not practical, just ointment applied several times per day will do. Keeping these areas moist will speed the healing time.    Treatment with liquid nitrogen can leave a scar. In dark skin, it may be a light or dark scar, in light skin it may be a white or pink scar. These will generally fade with time, but may never go away completely.     If you have any concerns after your treatment, please feel free to call the office.         [facility]  [facility]  [address] [place] [address]  Dept: [phone]

## 2018-04-05 NOTE — PROGRESS NOTES
Subjective:       Patient ID:  Ramon Reilly is a 77 y.o. male who presents for   Chief Complaint   Patient presents with    Skin Check     Initial visit  Recent move from [place], previously under care dermatologist Dr Noel Anderson h/o skin cancer  H/o AKs treated with cryo  Requests TBSE for cancer screening  Lifelong intense sun exposure, outdoor employment, [employer]          Review of Systems   Constitutional: Negative for weight loss, weight gain and night sweats.   Skin: Positive for wears hat (to cut grass only). Negative for daily sunscreen use and activity-related sunscreen use.   Hematologic/Lymphatic: Does not bruise/bleed easily.        Objective:    Physical Exam   Constitutional: He appears well-developed and well-nourished. No distress.   Neurological: He is alert and oriented to person, place, and time. He is not disoriented.   Psychiatric: He has a normal mood and affect.   Skin:   Areas Examined (abnormalities noted in diagram):   Scalp / Hair Palpated and Inspected  Head / Face Inspection Performed  Neck Inspection Performed  Chest / Axilla Inspection Performed  Abdomen Inspection Performed  Genitals / Buttocks / Groin Inspection Performed  Back Inspection Performed  RUE Inspected  LUE Inspection Performed  RLE Inspected  LLE Inspection Performed  Nails and Digits Inspection Performed                       Diagram Legend     Erythematous scaling macule/papule c/w actinic keratosis       Vascular papule c/w angioma      Pigmented verrucoid papule/plaque c/w seborrheic keratosis      Yellow umbilicated papule c/w sebaceous hyperplasia      Irregularly shaped tan macule c/w lentigo     1-2 mm smooth white papules consistent with Milia      Movable subcutaneous cyst with punctum c/w epidermal inclusion cyst      Subcutaneous movable cyst c/w pilar cyst      Firm pink to brown papule c/w dermatofibroma      Pedunculated fleshy papule(s) c/w skin tag(s)      Evenly pigmented macule c/w  junctional nevus     Mildly variegated pigmented, slightly irregular-bordered macule c/w mildly atypical nevus      Flesh colored to evenly pigmented papule c/w intradermal nevus       Pink pearly papule/plaque c/w basal cell carcinoma      Erythematous hyperkeratotic cursted plaque c/w SCC      Surgical scar with no sign of skin cancer recurrence      Open and closed comedones      Inflammatory papules and pustules      Verrucoid papule consistent consistent with wart     Erythematous eczematous patches and plaques     Dystrophic onycholytic nail with subungual debris c/w onychomycosis     Umbilicated papule    Erythematous-base heme-crusted tan verrucoid plaque consistent with inflamed seborrheic keratosis     Erythematous Silvery Scaling Plaque c/w Psoriasis     See annotation          Assessment / Plan:      Pathology Orders:     Normal Orders This Visit    Tissue Specimen To Pathology, Dermatology     Questions:    Directional Terms:  Other(comment)    Clinical information:  Hyperkeratotic plaque, r/o SCC    Specific Site:  left wrist        Neoplasm of uncertain behavior  -     Tissue Specimen To Pathology, Dermatology  Shave biopsy procedure note:    Shave biopsy performed after verbal consent including risk of infection, scar, recurrence, need for additional treatment of site. Area prepped with alcohol, anesthetized with approximately 1.0cc of 1% lidocaine with epinephrine. Lesional tissue shaved with razor blade. Hemostasis achieved with application of aluminum chloride followed by hyfrecation. No complications. Dressing applied. Wound care explained.    Actinic keratoses  Cryosurgery Procedure Note    Verbal consent from the patient is obtained and the patient is aware of the precancerous quality and need for treatment of these lesions. Liquid nitrogen cryosurgery is applied to the 12 actinic keratoses, as detailed in the physical exam, to produce a freeze injury. The patient is aware that blisters may  form and is instructed on wound care with gentle cleansing and use of vaseline ointment to keep moist until healed. The patient is supplied a handout on cryosurgery and is instructed to call if lesions do not completely resolve.    Seborrheic keratoses  These are benign inherited growths without a malignant potential. Reassurance given to patient. No treatment is necessary.     Inflamed seborrheic keratosis  Cryosurgery procedure note:    Verbal consent from the patient is obtained. Liquid nitrogen cryosurgery is applied to 2 lesions to produce a freeze injury. The patient is aware that blisters may form and is instructed on wound care with gentle cleansing and use of vaseline ointment to keep moist until healed. The patient is supplied a handout on cryosurgery and is instructed to call if lesions do not completely resolve.    Solar lentigo  This is a benign hyperpigmented sun induced lesion. Daily sun protection will reduce the number of new lesions. Treatment of these benign lesions are considered cosmetic.    Patient instructed in importance in daily sun protection of at least spf 30. Sun avoidance and topical protection discussed.   Recommend Elta MD (physician office) COTZ sensitive (available online) for daily use on face and neck.  Patient encouraged to wear hat for all outdoor exposure.   Also discussed sun protective clothing.               Follow-up in about 1 year (around 4/5/2019).

## 2018-04-30 ENCOUNTER — PROCEDURE VISIT (OUTPATIENT)
Dept: DERMATOLOGY | Facility: CLINIC | Age: 78
End: 2018-04-30
Payer: MEDICARE

## 2018-04-30 DIAGNOSIS — C44.629 SQUAMOUS CELL CARCINOMA OF SKIN OF LEFT UPPER EXTREMITY, INCLUDING SHOULDER: Primary | ICD-10-CM

## 2018-04-30 PROCEDURE — 88305 TISSUE EXAM BY PATHOLOGIST: CPT | Performed by: PATHOLOGY

## 2018-04-30 PROCEDURE — 12032 INTMD RPR S/A/T/EXT 2.6-7.5: CPT | Mod: S$GLB,,, | Performed by: DERMATOLOGY

## 2018-04-30 PROCEDURE — 99499 UNLISTED E&M SERVICE: CPT | Mod: S$GLB,,, | Performed by: DERMATOLOGY

## 2018-04-30 PROCEDURE — 11602 EXC TR-EXT MAL+MARG 1.1-2 CM: CPT | Mod: 51,S$GLB,, | Performed by: DERMATOLOGY

## 2018-04-30 NOTE — PATIENT INSTRUCTIONS
Surgery Wound Care    Your doctor has performed an excision today.  A bandage and vaseline ointment has been placed over the site.  This should remain in place for 24 hours.  It is recommended that you keep the area dry for the first 24 hours.  After 24 hours, you may remove the band aid and wash the area with warm soap and water and apply Vaseline jelly or aquaphore.  Many patients prefer to use Neosporin or Bacitracin ointment.  This is acceptable; however know that you can develop an allergy to this medication even if you have used it safely for years.  It is important to keep the area moist.  Letting it dry out and get air slows healing time, will worsen the scar, and make it more difficult to remove the stitches if they were placed.        If you notice increasing redness, tenderness, pain, or yellow drainage at the biopsy or surgical site, please notify your doctor.  These are signs of an infection.    If your biopsy/surgical site is bleeding, apply firm pressure for 15 minutes straight.  Repeat for another 15 minutes, if it is still bleeding.   If the surgical site continues to bleed, then please contact your doctor.     [facility]  [facility]  [address] [address]  Dept: [phone]

## 2018-04-30 NOTE — PROGRESS NOTES
Subjective:       Patient ID:  Ramon Reilly is a 77 y.o. male who presents for   Chief Complaint   Patient presents with    Pre-op Exam     left arm     Pt here for definitive excision SCC left wrist.  Feeling well today.   Denies pacemaker, defibrillator or blood thinners.   No additional cutaneous complaints.     FINAL PATHOLOGIC DIAGNOSIS  1. Skin, left wrist, shave biopsy:  - SUPERFICIALLY INVASIVE SQUAMOUS CELL CARCINOMA.  - THE TUMOR EXTENDS TO THE DEEP AND LATERAL BIOPSY MARGINS IN THE PLANES OF SECTION.  MICROSCOPIC DESCRIPTION: Sections show a squamous cell proliferation exhibiting atypia and infiltrating within  the superficial dermis.  Diagnosed by: Kalina Hoffmann M.D.  (Electronically Signed: 2018-04-10 15:40:13)        Review of Systems   Constitutional: Negative for fever and chills.        Objective:    Physical Exam   Constitutional: He appears well-developed and well-nourished. No distress.   Neurological: He is alert and oriented to person, place, and time. He is not disoriented.   Psychiatric: He has a normal mood and affect.   Skin:   Areas Examined (abnormalities noted in diagram):   LUE Inspection Performed              Diagram Legend     Erythematous scaling macule/papule c/w actinic keratosis       Vascular papule c/w angioma      Pigmented verrucoid papule/plaque c/w seborrheic keratosis      Yellow umbilicated papule c/w sebaceous hyperplasia      Irregularly shaped tan macule c/w lentigo     1-2 mm smooth white papules consistent with Milia      Movable subcutaneous cyst with punctum c/w epidermal inclusion cyst      Subcutaneous movable cyst c/w pilar cyst      Firm pink to brown papule c/w dermatofibroma      Pedunculated fleshy papule(s) c/w skin tag(s)      Evenly pigmented macule c/w junctional nevus     Mildly variegated pigmented, slightly irregular-bordered macule c/w mildly atypical nevus      Flesh colored to evenly pigmented papule c/w intradermal nevus       Pink  pearly papule/plaque c/w basal cell carcinoma      Erythematous hyperkeratotic cursted plaque c/w SCC      Surgical scar with no sign of skin cancer recurrence      Open and closed comedones      Inflammatory papules and pustules      Verrucoid papule consistent consistent with wart     Erythematous eczematous patches and plaques     Dystrophic onycholytic nail with subungual debris c/w onychomycosis     Umbilicated papule    Erythematous-base heme-crusted tan verrucoid plaque consistent with inflamed seborrheic keratosis     Erythematous Silvery Scaling Plaque c/w Psoriasis     See annotation      Assessment / Plan:      Pathology Orders:     Normal Orders This Visit    Tissue Specimen To Pathology, Dermatology     Questions:    Directional Terms:  Other(comment)    Clinical information:  bx proven SCC, check margins    Specific Site:  left wrist        Squamous cell carcinoma of skin of left upper extremity, including shoulder  -     Tissue Specimen To Pathology, Dermatology    PROCEDURE: Elliptical excision with intermediate layered repair in order to decrease dead space, decrease tension and close large gap.    ANESTHETIC: 6 cc 1% Xylocaine with Epinephrine 1:100,000, buffered    SURGEON: Estefani Nye MD  ASSISTANTS: Abigail Low LPN ; Alpesh Zuñiga MA      PREOPERATIVE DIAGNOSIS:  Biopsy-proven Squamous Cell Carcinoma    POSTOPERATIVE DIAGNOSIS:  Same as preoperative diagnosis    PATHOLOGIC DIAGNOSIS: Pending    LOCATION: left wrist    INITIAL LESION SIZE: 0.5x0.8 cm    EXCISED DIAMETER: 1.6 cm    PREPARATION: The diagnosis, procedure, alternatives, benefits and risks, including but not limited to: infection, bleeding/bruising, drug reactions, pain, scar or cosmetic defect, local sensation disturbances, wound dehiscence (separation of wound edges after sutures removed) and/or recurrence of present condition were explained to the patient. The patient elected to proceed.  Patient's identity was verified  using 2 patient identifiers and the side and site was verified.  Time out period with surgeon, assistant and patient in surgical suite was taken.    PROCEDURE: The location noted above was prepped and draped in the usual sterile fashion. The area was anesthetized with intradermal buffered xylocaine. Lesional tissue was carefully marked with at least 4 mm margins of clinically normal skin in all directions. A fusiform elliptical excision was done with #15 blade carried down completely through the dermis into the subcutaneous tissues to the level of the subcutaneous fat, and dissection was carried out in that plane.  Electrocoagulation was used to obtain hemostasis. Blood loss was minimal. The wound was then approximated in a layered fashion with subcutaneous and intradermal sutures of 4.0 Monocryl, approximately 5 in number, and the wound was then superficially closed with simple interrupted sutures of 4.0 Prolene.    The patient tolerated the procedure well.    The area was cleaned and dressed appropriately and the patient was given wound care instructions, as well as an appointment for follow-up evaluation.    LENGTH OF REPAIR: 4.4 cm           Follow-up in about 6 months (around 10/30/2018).

## 2018-05-02 DIAGNOSIS — N40.0 BENIGN PROSTATIC HYPERPLASIA, UNSPECIFIED WHETHER LOWER URINARY TRACT SYMPTOMS PRESENT: ICD-10-CM

## 2018-05-03 RX ORDER — TAMSULOSIN HYDROCHLORIDE 0.4 MG/1
CAPSULE ORAL
Qty: 90 CAPSULE | Refills: 0 | Status: SHIPPED | OUTPATIENT
Start: 2018-05-03 | End: 2018-06-25 | Stop reason: ALTCHOICE

## 2018-05-10 ENCOUNTER — CLINICAL SUPPORT (OUTPATIENT)
Dept: DERMATOLOGY | Facility: CLINIC | Age: 78
End: 2018-05-10
Payer: MEDICARE

## 2018-05-10 VITALS — BODY MASS INDEX: 37.8 KG/M2 | WEIGHT: 264 LBS | HEIGHT: 70 IN

## 2018-05-10 DIAGNOSIS — Z48.02 VISIT FOR SUTURE REMOVAL: Primary | ICD-10-CM

## 2018-05-10 PROCEDURE — 99024 POSTOP FOLLOW-UP VISIT: CPT | Mod: S$GLB,,, | Performed by: DERMATOLOGY

## 2018-05-10 PROCEDURE — 99999 PR PBB SHADOW E&M-EST. PATIENT-LVL II: CPT | Mod: PBBFAC,,,

## 2018-05-10 NOTE — PROGRESS NOTES
Suture Removal note:  CC: 77 y.o. male patient is here for suture removal.     HPI: Patient is s/p excision of SCC from the left wrist on 4/30/2018.  Patient reports no problems.    WOUND PE:  Sutures intact.  Wound healing well.  Good approximation of skin edges.  No signs or symptoms of infection.    IMPRESSION: Skin, left wrist, excision:  - HEALING BIOPSY SITE, NO RESIDUAL SQUAMOUS CELL CARCINOMA IS PRESENT.    PLAN:  Sutures removed today.  Continue wound care.    RTC: In 6 months.

## 2018-05-16 ENCOUNTER — PATIENT MESSAGE (OUTPATIENT)
Dept: FAMILY MEDICINE | Facility: CLINIC | Age: 78
End: 2018-05-16

## 2018-05-23 ENCOUNTER — TELEPHONE (OUTPATIENT)
Dept: UROLOGY | Facility: CLINIC | Age: 78
End: 2018-05-23

## 2018-05-23 NOTE — TELEPHONE ENCOUNTER
Phoned patient no answer left message on voicemail to give the office a call back. Patient needs sooner appointment

## 2018-05-29 ENCOUNTER — OFFICE VISIT (OUTPATIENT)
Dept: UROLOGY | Facility: CLINIC | Age: 78
End: 2018-05-29
Payer: MEDICARE

## 2018-05-29 ENCOUNTER — LAB VISIT (OUTPATIENT)
Dept: LAB | Facility: HOSPITAL | Age: 78
End: 2018-05-29
Attending: UROLOGY
Payer: MEDICARE

## 2018-05-29 ENCOUNTER — TELEPHONE (OUTPATIENT)
Dept: UROLOGY | Facility: CLINIC | Age: 78
End: 2018-05-29

## 2018-05-29 VITALS
BODY MASS INDEX: 37.92 KG/M2 | DIASTOLIC BLOOD PRESSURE: 75 MMHG | HEART RATE: 62 BPM | SYSTOLIC BLOOD PRESSURE: 143 MMHG | TEMPERATURE: 98 F | WEIGHT: 264.88 LBS | HEIGHT: 70 IN

## 2018-05-29 DIAGNOSIS — R31.0 GROSS HEMATURIA: ICD-10-CM

## 2018-05-29 DIAGNOSIS — N40.0 BENIGN PROSTATIC HYPERPLASIA, UNSPECIFIED WHETHER LOWER URINARY TRACT SYMPTOMS PRESENT: ICD-10-CM

## 2018-05-29 DIAGNOSIS — R97.20 ELEVATED PSA: Primary | ICD-10-CM

## 2018-05-29 DIAGNOSIS — R68.89 ABNORMAL DIGITAL RECTAL EXAM: ICD-10-CM

## 2018-05-29 DIAGNOSIS — R97.20 ELEVATED PSA: ICD-10-CM

## 2018-05-29 LAB
ALBUMIN SERPL BCP-MCNC: 4 G/DL
ALP SERPL-CCNC: 75 U/L
ALT SERPL W/O P-5'-P-CCNC: 14 U/L
ANION GAP SERPL CALC-SCNC: 10 MMOL/L
AST SERPL-CCNC: 13 U/L
BILIRUB SERPL-MCNC: 0.5 MG/DL
BILIRUB UR QL STRIP: NEGATIVE
BUN SERPL-MCNC: 22 MG/DL
CALCIUM SERPL-MCNC: 10 MG/DL
CHLORIDE SERPL-SCNC: 104 MMOL/L
CLARITY UR: CLEAR
CO2 SERPL-SCNC: 29 MMOL/L
COLOR UR: YELLOW
COMPLEXED PSA SERPL-MCNC: 11.2 NG/ML
CREAT SERPL-MCNC: 1 MG/DL
EST. GFR  (AFRICAN AMERICAN): >60 ML/MIN/1.73 M^2
EST. GFR  (NON AFRICAN AMERICAN): >60 ML/MIN/1.73 M^2
GLUCOSE SERPL-MCNC: 109 MG/DL
GLUCOSE UR QL STRIP: NEGATIVE
HGB UR QL STRIP: NEGATIVE
KETONES UR QL STRIP: NEGATIVE
LEUKOCYTE ESTERASE UR QL STRIP: NEGATIVE
NITRITE UR QL STRIP: NEGATIVE
PH UR STRIP: 6 [PH] (ref 5–8)
POTASSIUM SERPL-SCNC: 4.5 MMOL/L
PROT SERPL-MCNC: 7.7 G/DL
PROT UR QL STRIP: NEGATIVE
SODIUM SERPL-SCNC: 143 MMOL/L
SP GR UR STRIP: 1.01 (ref 1–1.03)
URN SPEC COLLECT METH UR: NORMAL
UROBILINOGEN UR STRIP-ACNC: 1 EU/DL

## 2018-05-29 PROCEDURE — 81003 URINALYSIS AUTO W/O SCOPE: CPT

## 2018-05-29 PROCEDURE — 88112 CYTOPATH CELL ENHANCE TECH: CPT

## 2018-05-29 PROCEDURE — 88112 CYTOPATH CELL ENHANCE TECH: CPT | Mod: 26,,,

## 2018-05-29 PROCEDURE — 36415 COLL VENOUS BLD VENIPUNCTURE: CPT

## 2018-05-29 PROCEDURE — 84153 ASSAY OF PSA TOTAL: CPT

## 2018-05-29 PROCEDURE — 87086 URINE CULTURE/COLONY COUNT: CPT

## 2018-05-29 PROCEDURE — 99215 OFFICE O/P EST HI 40 MIN: CPT | Mod: S$GLB,,, | Performed by: UROLOGY

## 2018-05-29 PROCEDURE — 99999 PR PBB SHADOW E&M-EST. PATIENT-LVL V: CPT | Mod: PBBFAC,,, | Performed by: UROLOGY

## 2018-05-29 PROCEDURE — 80053 COMPREHEN METABOLIC PANEL: CPT

## 2018-05-29 RX ORDER — CIPROFLOXACIN 500 MG/1
500 TABLET ORAL 2 TIMES DAILY
Qty: 3 TABLET | Refills: 0 | Status: SHIPPED | OUTPATIENT
Start: 2018-05-29 | End: 2018-05-31

## 2018-05-29 NOTE — PATIENT INSTRUCTIONS
Elevated PSA and abnormal LIZZIE, PIRADS 4 MRI (right mid gland PZ)  - in this fairly healthy patient with elevated PSA of 9.7, no signs of infection, abnormal LIZZIE, and pirads 4 on MRI no recent ejaculation, would go ahead and recommend a prostate biopsy. Explained ot him the risks and benefits of undergoing a bx. Although he is 77 with high likelihood of findings significant cancer and minimal comorbidities recommend biopsy  -extra biopsies from are suspicious on mri   -I will write for antibiotics and two fleets enemas. The cipro is filled at the pharmacy and the enemas are over the counter.   -Take cipro the night before , day of and night of procedure (antibiotics). This needs to be filled at pharmacy.   -One fleets per rectum night before procedure and morning of procedure to clear out stool.  -ativan preop  -He has not been on Cipro in the past.   Anticoagulation: asa 81mg daily -will stop, no RF for cardiac  -psa today     BPH  -continue flomax 0.4mg nightly, doing well on this    Gross hematuria  -cysto at time of trus  -ua today  -urine cytology   -ct abdomen and pelvis     F/u next Monday for cysto trus

## 2018-05-29 NOTE — PROGRESS NOTES
"[facility] [place]  Staff: MD Janis    Referring provider and please cc: Polo and   PCP:  (leaving), changing to [facility]    Chief Complaint: elevated psa    Subjective:        HPI: Ramon Reilly is a 77 y.o. male presents with     Elevated psa   -referred by  to urology for elevated psa found on workup for enlarged prostate. Pt was started on flomax. seen by antonio as a referral for elevated psa 9.7 in 2/16/18.  LIZZIE by revealed nodular prostate and referred to  who saw him on 3/28/18 and ordered an MRI. No previous MRIs to compare to here but last one in [place] in 2015 and per pt was "low".    5/29/18 11.2  4/25/18 MRI: Pirads 4 posteriormedial to PZ, midgland to apex on the right   1/30/18 9.7    bph with luts and nocturia  -started on flomax by  in January for weak stream, nocturia 3-4x a night. Now stream is improved and nocturia 1-2x a night. Wife here with him and denies him snoring.   -AUA SSx: 3, mostly satisfied  -pvr by bladder scan: 63cc    IIEF: 5    ECOG Status: 0    Gross hematuria  -had chest pain 3/4/18, found to have cholecystitis and was referred for cholecystectomy.  UA at ER was negative that day  but when he went home he saw pink urine for 2 days. No uti symptoms. F/u ua on 3/19/18 negative.  -on no blood thinners except for asa, no h/o stones, former smoker (30 pack year history). No stds in the past.     UA today:- sent for urinalys, culture an cytology today -he could not provide in clinic  UCx:  none    Vasectomy: no    REVIEW OF SYSTEMS:  General ROS: no fevers, no chills  Psychological ROS: no depression  Endocrine ROS: no heat or cold  Respiratory ROS: no SOB  Cardiovascular ROS: no CP  Gastrointestinal ROS: no abdominal pain, no constipation, no diarrhea, no BRBPR  Musculoskeletal ROS: no muscle pain  Neurological ROS: no headaches  Dermatological ROS: no rashes  HEENT: " +glasses, no sinus   ROS: per HPI     PMHx:  Past Medical History:   Diagnosis Date    BPH (benign prostatic hyperplasia)     Wears glasses      Kidney stones: No  Cataracts? Beginning      PSHx:  Past Surgical History:   Procedure Laterality Date    CHOLECYSTECTOMY      EYE SURGERY Right     eye lid surgery     HERNIA REPAIR      TONSILLECTOMY, ADENOIDECTOMY      WISDOM TOOTH EXTRACTION     umbilical hernia repair with recurrence    Stents/Valves/Foreign Bodies: Yes   Cardiac Evaluation: No    Screening Studies  Colonoscopy: none     Fam Hx:   malignancies: No  . Gyn malignancies: mother had breast cancer. Mother  a 72 of breast cancer. Father  a 77 of MI  kidney stones: No     Soc Hx:  Former tobacco, quit .  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    Medications: reviewed   Anticoagulation: Yes - asa 81mg daily     Objective:     Vitals:    18 0929   BP: (!) 143/75   Pulse: 62   Temp: 98.1 °F (36.7 °C)   normally sbp 120s      General:WDWN in NAD  Eyes: PERRLA, normal conjunctiva  Respiratory: No increased work on breathing.   Cardiovascular: No obvious extremity edema. Warm and well perfused.   GI: palpation of masses. No tenderness. No hepatosplenomegaly to palpation.  Musculoskeletal: normal range of motion of bilateral upper extremities. Normal muscle strength and tone.  Skin: no obvious rashes or lesions. No tightening of skin noted.  Neurologic: CN grossly normal. Normal sensation.   Psychiatric: awake, alert and oriented x 3. Mood and affect normal. Cooperative.    :  Inspection of anus normal  No scrotal rashes, cysts or lesions  Epididymis normal in size, no tenderness  Testes normal and size, equal size bilaterally, no masses  Urethral meatus normal without discharge  Penis is  circumcised,    LIZZIE: 40g gland 1cm prostate nodule right side mid gland towards apex, tenderness. SV not palpable. Normal sphincter tone. No hemhorroids.  No bilateral inguinal hernias noted       LABS REVIEW:      Cr:   Lab Results   Component Value Date    CREATININE 1.0 03/04/2018       PATHOLOGY REVIEW:  none    RADIOGRAPHIC REVIEW:  MRI of the prostate with and without contrast at [facility]  Date of Service: 4/25/18    Clinical history: Ramon Reilly is a 77 y.o. male with elevated psa and abnormal LIZZIE     Findings:  Prostate volume: 63mL  Intravesical protrusion: none  Post bx hemhorrage: none  Tumor Localization:    - PZ: pirads 4 posterior to posteromedial PZ of the midgland to apex on the right with epicenter in the midgland. No gross macrocopic EPe noted but does bulge the capsule.   -TZ: pirads 2.     Additional findings:   1. Sv, LN, bones negative.     Impression:   1. findigns c/w pirads 4 1.13mL index lesion.     PIRADS scale:  1 - Very low, clinically significant cancer unlikely to be present  2- Low, clinically significant cancer unlikely to be present  3- Intermediate, the presence of clinically sig cancer is equivocal  4- High, clinically sig cancer likely present  5 - Very High, clinically sig cancer highly likely to be present    Read by: Ba Bryson and Ramon Villalta MD    Please send a copy of the MRI results to the pt        Assessment:       1. Elevated PSA    2. Abnormal digital rectal exam    3. Benign prostatic hyperplasia, unspecified whether lower urinary tract symptoms present    4. Gross hematuria          Plan:     Elevated PSA and abnormal LIZZIE, PIRADS 4 MRI (right mid gland PZ)  - in this fairly healthy patient with elevated PSA of 9.7, no signs of infection, abnormal LIZZIE, and pirads 4 on MRI no recent ejaculation, would go ahead and recommend a prostate biopsy. Explained ot him the risks and benefits of undergoing a bx. Although he is 77 with high likelihood of findings  significant cancer and minimal comorbidities recommend biopsy  -extra biopsies from are suspicious on mri   -I will write for antibiotics and two fleets enemas. The cipro is filled at the pharmacy and the enemas are over the counter.   -Take cipro the night before , day of and night of procedure (antibiotics). This needs to be filled at pharmacy.   -One fleets per rectum night before procedure and morning of procedure to clear out stool.  -ativan preop  -He has not been on Cipro in the past.   Anticoagulation: asa 81mg daily -will stop, no RF for cardiac  -psa today and cmp today     BPH with nocturia, LUTS   -continue flomax 0.4mg nightly, doing well on this    Gross hematuria  -cysto at time of trus  -ua today  -urine cytology   -ct abdomen and pelvis     F/u next Monday for cysto trus and biopsy (target MRI lesion)    Christie Bruce MD

## 2018-05-30 LAB — BACTERIA UR CULT: NORMAL

## 2018-05-31 ENCOUNTER — PATIENT MESSAGE (OUTPATIENT)
Dept: UROLOGY | Facility: CLINIC | Age: 78
End: 2018-05-31

## 2018-06-01 ENCOUNTER — TELEPHONE (OUTPATIENT)
Dept: UROLOGY | Facility: CLINIC | Age: 78
End: 2018-06-01

## 2018-06-01 ENCOUNTER — TELEPHONE (OUTPATIENT)
Dept: GASTROENTEROLOGY | Facility: CLINIC | Age: 78
End: 2018-06-01

## 2018-06-01 ENCOUNTER — HOSPITAL ENCOUNTER (OUTPATIENT)
Dept: RADIOLOGY | Facility: HOSPITAL | Age: 78
Discharge: HOME | End: 2018-06-01
Attending: UROLOGY
Payer: MEDICARE

## 2018-06-01 DIAGNOSIS — R97.20 ELEVATED PSA: ICD-10-CM

## 2018-06-01 DIAGNOSIS — R31.0 GROSS HEMATURIA: ICD-10-CM

## 2018-06-01 DIAGNOSIS — R68.89 ABNORMAL DIGITAL RECTAL EXAM: ICD-10-CM

## 2018-06-01 DIAGNOSIS — N40.0 BENIGN PROSTATIC HYPERPLASIA, UNSPECIFIED WHETHER LOWER URINARY TRACT SYMPTOMS PRESENT: ICD-10-CM

## 2018-06-01 PROCEDURE — 74178 CT ABD&PLV WO CNTR FLWD CNTR: CPT | Mod: TC

## 2018-06-01 PROCEDURE — 25500020 PHARM REV CODE 255

## 2018-06-01 PROCEDURE — 74178 CT ABD&PLV WO CNTR FLWD CNTR: CPT | Mod: 26,,, | Performed by: RADIOLOGY

## 2018-06-01 RX ORDER — ALPRAZOLAM 0.25 MG/1
0.5 TABLET ORAL
Status: DISCONTINUED | OUTPATIENT
Start: 2018-06-04 | End: 2018-06-01

## 2018-06-01 RX ORDER — SODIUM CHLORIDE 9 MG/ML
INJECTION, SOLUTION INTRAVENOUS
Status: DISPENSED
Start: 2018-06-01 | End: 2018-06-01

## 2018-06-01 RX ADMIN — IOHEXOL 100 ML: 350 INJECTION, SOLUTION INTRAVENOUS at 09:06

## 2018-06-01 NOTE — TELEPHONE ENCOUNTER
I spoke with the pt today and reviewed his ct scan results with him today. I explained that he very likely has prostate cancer but this is not likely to cause abdominal and liver implants. I recommend that he proceed with schedule a colonoscopy and seeing  and scheduling a colonoscopy ASAP and then seeing  once results of colonoscopy and prostate biopsy results back, but may want to go ahead and arrange a f/u a 3 week appt since he will likely need onc

## 2018-06-01 NOTE — TELEPHONE ENCOUNTER
----- Message from Sally Aggarwal MD sent at 6/1/2018  4:01 PM CDT -----  Please expedite scheduling for GI clinic when he can after Monday (ok to overbook if needed)  ----- Message -----  From: Christie Bruce MD  Sent: 6/1/2018   3:10 PM  To: Cony Schwartz MD, MD Anil Elliott and Cony,   Pt seeing me for microhematuria and suspicious high grade prostate cancer. Going to get biopsy Monday but don't typically see implants suggested by this ct for prostate cancer. I suspect his biopsy will be positive (+ mri, elevated psa and + digital rectal exam). He's never had a colonsoscopy and ct see questionable transverse colon. i'll talk to him about results Monday but figured we should go ahead and coordinate f/u for him. I'll see him Monday afternoon and discuss his results with him at time of his cysto and then send you and your staff a message to make appt assuming ya'll agree with plan,  Mary Alice    Sent same message via results

## 2018-06-01 NOTE — TELEPHONE ENCOUNTER
Spoke to pt's wife, pt wasn't available and she said I can call back on Mon 6/4/18 to schedule appt for pt.

## 2018-06-04 ENCOUNTER — TELEPHONE (OUTPATIENT)
Dept: GASTROENTEROLOGY | Facility: CLINIC | Age: 78
End: 2018-06-04

## 2018-06-04 ENCOUNTER — SURGERY (OUTPATIENT)
Age: 78
End: 2018-06-04

## 2018-06-04 ENCOUNTER — TELEPHONE (OUTPATIENT)
Dept: UROLOGY | Facility: CLINIC | Age: 78
End: 2018-06-04

## 2018-06-04 ENCOUNTER — HOSPITAL ENCOUNTER (OUTPATIENT)
Facility: AMBULARY SURGERY CENTER | Age: 78
Discharge: HOME | End: 2018-06-04
Attending: UROLOGY | Admitting: UROLOGY
Payer: MEDICARE

## 2018-06-04 DIAGNOSIS — R68.89 ABNORMAL DIGITAL RECTAL EXAM: ICD-10-CM

## 2018-06-04 DIAGNOSIS — R97.20 ELEVATED PSA: ICD-10-CM

## 2018-06-04 DIAGNOSIS — N40.0 BENIGN PROSTATIC HYPERPLASIA, UNSPECIFIED WHETHER LOWER URINARY TRACT SYMPTOMS PRESENT: ICD-10-CM

## 2018-06-04 DIAGNOSIS — R31.0 GROSS HEMATURIA: ICD-10-CM

## 2018-06-04 LAB
BILIRUB SERPL-MCNC: NORMAL MG/DL
BLOOD URINE, POC: NORMAL
COLOR, POC UA: NORMAL
GLUCOSE UR QL STRIP: NORMAL
KETONES UR QL STRIP: NORMAL
LEUKOCYTE ESTERASE URINE, POC: NORMAL
NITRITE, POC UA: NORMAL
PH, POC UA: 5
PROTEIN, POC: NORMAL
SPECIFIC GRAVITY, POC UA: 1.01
UROBILINOGEN, POC UA: NORMAL

## 2018-06-04 PROCEDURE — 88305 TISSUE EXAM BY PATHOLOGIST: CPT | Mod: 26,,, | Performed by: PATHOLOGY

## 2018-06-04 PROCEDURE — 52000 CYSTOURETHROSCOPY: CPT | Mod: 59,,, | Performed by: UROLOGY

## 2018-06-04 PROCEDURE — 55700 HC PROSTATE NEEDLE BIOPSY: CPT | Performed by: UROLOGY

## 2018-06-04 PROCEDURE — 76872 US TRANSRECTAL: CPT | Performed by: UROLOGY

## 2018-06-04 PROCEDURE — 76942 ECHO GUIDE FOR BIOPSY: CPT | Mod: 26,59,, | Performed by: UROLOGY

## 2018-06-04 PROCEDURE — 52000 CYSTOURETHROSCOPY: CPT | Performed by: UROLOGY

## 2018-06-04 PROCEDURE — 76872 US TRANSRECTAL: CPT | Mod: 26,,, | Performed by: UROLOGY

## 2018-06-04 PROCEDURE — 88305 TISSUE EXAM BY PATHOLOGIST: CPT | Performed by: PATHOLOGY

## 2018-06-04 PROCEDURE — 55700 PR BIOPSY OF PROSTATE,NEEDLE/PUNCH: CPT | Mod: ,,, | Performed by: UROLOGY

## 2018-06-04 RX ORDER — ALPRAZOLAM 1 MG/1
TABLET ORAL
Status: COMPLETED
Start: 2018-06-04 | End: 2018-06-04

## 2018-06-04 RX ORDER — ALPRAZOLAM 1 MG/1
1 TABLET ORAL
Status: COMPLETED | OUTPATIENT
Start: 2018-06-04 | End: 2018-06-04

## 2018-06-04 RX ORDER — WATER 1000 ML/1000ML
INJECTION, SOLUTION INTRAVENOUS
Status: DISCONTINUED | OUTPATIENT
Start: 2018-06-04 | End: 2018-06-04 | Stop reason: HOSPADM

## 2018-06-04 RX ORDER — LIDOCAINE HYDROCHLORIDE 20 MG/ML
JELLY TOPICAL
Status: DISCONTINUED
Start: 2018-06-04 | End: 2018-06-04 | Stop reason: HOSPADM

## 2018-06-04 RX ORDER — LIDOCAINE HYDROCHLORIDE 10 MG/ML
INJECTION, SOLUTION EPIDURAL; INFILTRATION; INTRACAUDAL; PERINEURAL
Status: DISCONTINUED
Start: 2018-06-04 | End: 2018-06-04 | Stop reason: WASHOUT

## 2018-06-04 RX ORDER — CIPROFLOXACIN 500 MG/1
500 TABLET ORAL 2 TIMES DAILY
COMMUNITY
End: 2018-06-25 | Stop reason: ALTCHOICE

## 2018-06-04 RX ORDER — VIT C/E/ZN/COPPR/LUTEIN/ZEAXAN 250MG-90MG
1000 CAPSULE ORAL DAILY
COMMUNITY

## 2018-06-04 RX ORDER — LIDOCAINE HYDROCHLORIDE 10 MG/ML
INJECTION, SOLUTION EPIDURAL; INFILTRATION; INTRACAUDAL; PERINEURAL
Status: DISCONTINUED | OUTPATIENT
Start: 2018-06-04 | End: 2018-06-04 | Stop reason: HOSPADM

## 2018-06-04 RX ORDER — LIDOCAINE HYDROCHLORIDE 20 MG/ML
JELLY TOPICAL
Status: DISCONTINUED | OUTPATIENT
Start: 2018-06-04 | End: 2018-06-04 | Stop reason: HOSPADM

## 2018-06-04 RX ADMIN — ALPRAZOLAM 1 MG: 1 TABLET ORAL at 01:06

## 2018-06-04 RX ADMIN — WATER 500 ML: 1000 INJECTION, SOLUTION INTRAVENOUS at 02:06

## 2018-06-04 RX ADMIN — LIDOCAINE HYDROCHLORIDE 5 ML: 20 JELLY TOPICAL at 02:06

## 2018-06-04 RX ADMIN — LIDOCAINE HYDROCHLORIDE 10 ML: 10 INJECTION, SOLUTION EPIDURAL; INFILTRATION; INTRACAUDAL; PERINEURAL at 02:06

## 2018-06-04 RX ADMIN — LIDOCAINE HYDROCHLORIDE 10 ML: 20 JELLY TOPICAL at 02:06

## 2018-06-04 NOTE — TELEPHONE ENCOUNTER
----- Message from Christie Bruce MD sent at 6/4/2018  3:39 PM CDT -----  He's going to f/u in 2-3 weeks

## 2018-06-04 NOTE — OP NOTE
[facility] Procedure Note  Date: 2018      Transrectal prostate ultrasound, ultrasound guided prostate biopsy  and Cystoscopy    Staff surgeon: Janis  Date: 2018  Anesthesia: lidocaine jelly  EBL: minimal  Complications: none    NAME:Ramon Reilly  : 1940  Diagnosis:BPH  Current PSA: 11.2    Indications: Ramon Reilly is a 77 y.o. male with an episode of gross hematuria and elevated psa with abnormal MRI and prostate nodule here for cysto prostate biopsy today.     Urinalysis: negative   Anticoagulation: asa 81mg but hel    FLEXIBLE CYSTOSCOPY    Anesthesia: 2% uro-jet lidocaine jelly for local analgesia    Flexible cysto-urethroscopy was performed after consent was obtained.  The risks and benefits were explained.    2% lidocaine urojet was used for local analgesia.  The genitalia was prepped and draped in the sterile fashion with betadine.    The flexible scope was advanced into the urethra and into the bladder.  Bilateral ureteral orifice were evaluated and noted to be normal with clear efflux.  The bladder was completely surveyed in a systematic fashion.   No bladder tumors or lesions were seen.  No strictures were noted.  The prostate showed Mild hypertrophy.  There was No median lobe present.     TRANSRECTAL ULTRASOUND AND BIOPSY  Measurements:   Height:  36.6 mm  Width:  52.5 mm  Length:  55.4 mm  Intravesical protrusion: minimal  Volume: 55.4 cm3  PSAD: 0.20    Total number of cores taken: 17  Right base lateral- 1  Right base medial - 1  Right mid lateral - 1  Right mid medial - 2  Right apex lateral - 1  Right apex medial - 3  Right apical nodule - 2  Right TZ - 1  Left base lateral - 1  Left base medial - 1  Left mid lateral - 1  Left mid medial - 1  Left apex lateral - 1  Left apex medial - 1  Left TZ - 1      Seminal vesicles/Ejaculatory Ducts: Normal  Outline/Symmetry of Prostate: right apex nodule visualized and biopsied  Central Gland/Transition Zone:  Well demarcated especially on left  Peripheral Zone: Hypoechoic   Bladder: Normal  MedianLobe: Normal    It was confirmed that the patient took the antibiotic and fleets enemas last night and this morning. A LIZZIE was performed showing 1cm nodule on right. Biopsies were taken with ultrasound guidance, using a spring loaded needle device. Biopsies each were taken from the base, mid-portion, apex from both the left and right. Minimal rectal bleeding was observed. The patient tolerated the procedure well. He is to keep taking the prescribed antibiotics until finished. Post biopsy instructions were given and he is to follow up in one week to discuss the pathologist report.    The patient tolerated the procedures well without complication.    Findings:  trus volume: 55.4g, psad 0.20.   Extra biopsies taken from pirads 4 indicated areas: R mid medial-2 biopsies, R apex medial - 3 biopsies,  R apical nodules - 2 cores.   Cysto showed no lesions in bladder and b lobe hypertrophy     Plan:  F/u with  for colonoscopy    F/u in 2 weeks to review biopsy. However if path + for high grade will schedule bone scan and have pt f/u after bone scan to review bone scan and path results.     If colonoscopy shows colon cancer - explains visceral mets and will treat prostate cancer as indicated for intermediate (ADT for 6 months +radiation) or high grade and negative bone scan (ADT for 1-2 years + radiation) or high grade and + bone scan (ADT and possible zytiga only) unless treatment for colon cancer also involves radiation.     If colonoscopy does not show cancer then may need referral to [facility] for recs for biopsy of abdominal mets and will go ahead and treat prostate cancer with ADT for now.         Christie Bruce MD

## 2018-06-04 NOTE — H&P
"[facility] H&P Note - [place]  Staff: MD Janis     Referring provider and please cc: Polo and   PCP:  (leaving), changing [facility]     Chief Complaint: elevated psa     Subjective:        HPI: Ramon Reilly is a 77 y.o. male presents with      Elevated psa, prostate nodule  -referred by  to urology for elevated psa found on workup for enlarged prostate. Pt was started on flomax. seen by antonio as a referral for elevated psa 9.7 in 2/16/18.  LIZZIE revealed nodular prostate and referred to  who saw him on 3/28/18 and ordered an MRI. No previous MRIs to compare to here but last one in [place] in 2015 and per pt was "low". MRI showed pirads 4 lesion.   -seen by me for the first time 5/29/18 to discuss MRI. LIZZIE with large 1cm nodule.   -ctu 6/1/18 multiple serosal liver lesions and anterior abdominal wall lesions and 5mm non-obstructing rmp stone  -here today for prostate biopsy      5/29/18            11.2  4/25/18            MRI: Pirads 4 posteriormedial to PZ, midgland to apex on the right   1/30/18            9.7     bph with luts and nocturia  -started on flomax by  in January for weak stream, nocturia 3-4x a night. Now stream is improved and nocturia 1-2x a night. Wife here with him and denies him snoring.   -AUA SSx: 3, mostly satisfied  -pvr by bladder scan: 63cc    IIEF: 5     ECOG Status: 0     Gross hematuria  -had chest pain 3/4/18, found to have cholecystitis and was referred for cholecystectomy.  UA at ER was negative that day  but when he went home he saw pink urine for 2 days. No uti symptoms. F/u ua on 3/19/18 negative.  -on no blood thinners except for asa, no h/o stones, former smoker (30 pack year history). No stds in the past.   -urine cytology 5/31/18: negative   -ctu 6/1/18 showed a non-obstructing stone which could account for hematuria but not gross hematuria. ctu also  showed multiple lesions on the " serosa of the liver and abdominal wall. He's never had a screening colonoscopy. I discussed this with him Friday Coordinating GI follow up for colonscopy and f/u with  after colonscopy and prostate biopsy results return.    -cysto today    UA today: negative  UCx/ua history:  18 No cx, void: negative, cytologyL negative     Vasectomy: no     REVIEW OF SYSTEMS:  General ROS: no fevers, no chills  Psychological ROS: no depression  Endocrine ROS: no heat or cold  Respiratory ROS: no SOB  Cardiovascular ROS: no CP  Gastrointestinal ROS: no abdominal pain, no constipation, no diarrhea, no BRBPR  Musculoskeletal ROS: no muscle pain  Neurological ROS: no headaches  Dermatological ROS: no rashes  HEENT: +glasses, no sinus   ROS: per HPI     PMHx:       Past Medical History:   Diagnosis Date    BPH (benign prostatic hyperplasia)      Wears glasses        Kidney stones: No  Cataracts? Beginning       PSHx:        Past Surgical History:   Procedure Laterality Date    CHOLECYSTECTOMY        EYE SURGERY Right       eye lid surgery     HERNIA REPAIR       TONSILLECTOMY, ADENOIDECTOMY        WISDOM TOOTH EXTRACTION       umbilical hernia repair with recurrence     Stents/Valves/Foreign Bodies: Yes   Cardiac Evaluation: No     Screening Studies  Colonoscopy: none      Fam Hx:   malignancies: No  . Gyn malignancies: mother had breast cancer. Mother  a 72 of breast cancer. Father  a 77 of MI  kidney stones: No      Soc Hx:  Former tobacco, quit .  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     Medications: reviewed   Anticoagulation: Yes - asa 81mg daily      Objective:      Vitals:    18 1415   BP: 114/66   Pulse: 62   Resp: 20   Temp:           General:WDWN in NAD  Eyes: PERRLA, normal  conjunctiva  Respiratory: No increased work on breathing.   Cardiovascular: No obvious extremity edema. Warm and well perfused.   GI: palpation of masses. No tenderness. No hepatosplenomegaly to palpation.  Musculoskeletal: normal range of motion of bilateral upper extremities. Normal muscle strength and tone.  Skin: no obvious rashes or lesions. No tightening of skin noted.  Neurologic: CN grossly normal. Normal sensation.   Psychiatric: awake, alert and oriented x 3. Mood and affect normal. Cooperative.      5/29/18:  Inspection of anus normal  No scrotal rashes, cysts or lesions  Epididymis normal in size, no tenderness  Testes normal and size, equal size bilaterally, no masses  Urethral meatus normal without discharge  Penis is circumcised,    LIZZIE: 40g gland 1cm prostate nodule right side mid gland towards apex, tenderness. SV not palpable. Normal sphincter tone. No hemhorroids.  No bilateral inguinal hernias noted         LABS REVIEW:        Cr:         Lab Results   Component Value Date     CREATININE 1.0 03/04/2018         PATHOLOGY REVIEW:  Urine cytology 5/31/18:  Negative for malignant cells     RADIOGRAPHIC REVIEW:  ctu 6/1/18  5mm RMP stone. No renal masses, no hydro  subcm likely cysts on both kidneys  Prostate enlarged with diffuse bladder wall thickening  Small nodular serosal implants on the live, anterior abdominal wall suspicious for metastatic dz  Questionable transverse colon mass       MRI of the prostate with and without contrast at [facility]  Date of Service: 4/25/18     Clinical history: Ramon Reilly is a 77 y.o. male with elevated psa and abnormal LIZZIE      Findings:  Prostate volume: 63mL  Intravesical protrusion: none  Post bx hemhorrage: none  Tumor Localization:               - PZ: pirads 4 posterior to posteromedial PZ of the midgland to apex on the right with epicenter in the midgland. No gross macrocopic EPe noted but does bulge the capsule.              -TZ: pirads 2.       Additional findings:   1. Sv, LN, bones negative.      Impression:   1. findigns c/w pirads 4 1.13mL index lesion.      PIRADS scale:  1 - Very low, clinically significant cancer unlikely to be present  2- Low, clinically significant cancer unlikely to be present  3- Intermediate, the presence of clinically sig cancer is equivocal  4- High, clinically sig cancer likely present  5 - Very High, clinically sig cancer highly likely to be present     Read by: Ba Bryson and Ramon Villalta MD           Assessment:       1. Elevated PSA    2. Abnormal digital rectal exam    3. Benign prostatic hyperplasia, unspecified whether lower urinary tract symptoms present    4. Gross hematuria           Plan:      Elevated PSA and abnormal LIZZIE, PIRADS 4 MRI (right mid gland PZ)  - in this fairly healthy patient with elevated PSA of 9.7, no signs of infection, abnormal LIZZIE, and pirads 4 on MRI no recent ejaculation, would go ahead and recommend a prostate biopsy. Explained ot him the risks and benefits of undergoing a bx. Although he is 77 with high likelihood of findings significant cancer and minimal comorbidities recommend biopsy  -extra biopsies from are suspicious on mri   -I will write for antibiotics and two fleets enemas. The cipro is filled at the pharmacy and the enemas are over the counter.   -Take cipro the night before , day of and night of procedure (antibiotics). This needs to be filled at pharmacy.   -One fleets per rectum night before procedure and morning of procedure to clear out stool.  -ativan preop  -He has not been on Cipro in the past.   Anticoagulation: asa 81mg daily -will stop, no RF for cardiac    He took cipro and fleets as directed    Here today for cysto trus and biopsy (target MRI lesion)             - PZ: pirads 4 posterior to posteromedial PZ of the midgland to apex on the right with epicenter in the midgland. No gross macrocopic EPe noted but does bulge the capsule.    BPH with nocturia, LUTS    -continue flomax 0.4mg nightly, doing well on this     Gross hematuria  -cysto today  -urine cytology 5/31/18: negative   -ct abdomen and pelvis showed a non-obstructing stone which could account for hematuria but not gross hematuria.    Metastatic lesions on serosa and liver  -ct showed multiple lesions on the serosa of the liver and abdominal wall. He's never had a screening colonoscopy.  Coordinating GI follow up for colonscopy and f/u with  after colonscopy and prostate biopsy results return.          This patient has been cleared for surgery in ambulatory surgical facility.

## 2018-06-04 NOTE — DISCHARGE INSTRUCTIONS
After the procedure    · Drink plenty of fluids.  · You may have burning or light bleeding when you urinate--this is normal.  · Medications may be prescribed to ease any discomfort or prevent infection. Take these as directed.  · Call your doctor if you have heavy bleeding or blood clots, burning that lasts more than a day, a fever over 100°F  (38° C), or trouble urinating.          Transrectal Ultrasound and Biopsy  A transrectal ultrasound is an imaging test. It uses sound waves to create pictures of a mans prostate gland. Your prostate gland is in front of your rectum. For this test, a special probe (transducer) is placed directly into your rectum. During the test, tissue samples (a biopsy) may also be taken. The test is done by a specially trained technologist (a sonographer).    Before leaving, you may need to wait for a short time while the images are reviewed. In most cases, you can go back to your normal routine after the test. If you had a biopsy, you may see some blood in your urine, sperm, or stool for a day or so. This is normal. Your health care provider will let you know when your test results are ready.  In some cases, a diagnosis cant be made from the tissue sample that was taken. If this happens, your provider will talk with you about whether you may need another biopsy. Or you may need a different procedure.       © 8685-1164 [employer]. [address] [place] [address], [place] [address]. All rights reserved. This information is not intended as a substitute for professional medical care. Always follow your healthcare professional's instructions.

## 2018-06-04 NOTE — DISCHARGE SUMMARY
[facility]  Urology  Discharge Note - Short Stay      Patient Name: Ramon Reilly  MRN: [id number]  Discharge Date and Time:  06/04/2018 4:40 PM  Attending Physician: No att. providers found   Discharging Provider: Christie Bruce MD  Primary Care Physician: Antwan Fontana MD    Final Active Diagnoses:    Diagnosis Date Noted POA    Elevated PSA [R97.20] 05/29/2018 Yes      Problems Resolved During this Admission:    Diagnosis Date Noted Date Resolved POA       Final Diagnoses: Same as principal problem.    Hospital Course: Patient was admitted for an outpatient procedure and tolerated the procedure well with no complications.*    Procedure(s) (LRB):  TRANSRECTAL ULTRASOUND BIOPSY (N/A)  CYSTOSCOPY (N/A)     Indwelling Lines/Drains at time of discharge:   Lines/Drains/Airways          No matching active lines, drains, or airways          Discharged Condition: good    Disposition: home    Follow Up:      Patient Instructions:   No discharge procedures on file.    Medications:  Reconciled Home Medications:      Medication List      CHANGE how you take these medications    VITAMIN D3 1,000 unit capsule  Generic drug:  cholecalciferol (vitamin D3)  Take 1,000 Units by mouth once daily.  What changed:  Another medication with the same name was removed. Continue taking this medication, and follow the directions you see here.        CONTINUE taking these medications    aspirin 81 MG EC tablet  Commonly known as:  ECOTRIN  Take 81 mg by mouth once daily.     ciprofloxacin HCl 500 MG tablet  Commonly known as:  CIPRO  Take 500 mg by mouth 2 (two) times daily.     FLEET BISACODYL 10 mg/30 mL Enem  Generic drug:  bisacodyl  Place 10 mg rectally once.     tamsulosin 0.4 mg Cp24  Commonly known as:  FLOMAX  TAKE 1 CAPSULE BY MOUTH EVERY DAY     VITAMIN D-3 ORAL  Take by mouth once daily.            Discharge Procedure Orders (must include Diet, Follow-up, Activity):  No discharge procedures on  file.       F/u with  for colonoscopy    F/u in 2 weeks to review biopsy. However if path + for high grade will schedule bone scan and have pt f/u after bone scan to review bone scan and path results.     If colonoscopy shows colon cancer - explains visceral mets and will treat prostate cancer as indicated for intermediate (ADT for 6 months +radiation) or high grade and negative bone scan (ADT for 1-2 years + radiation) or high grade and + bone scan (ADT and possible zytiga only) unless treatment for colon cancer also involves radiation.     If colonoscopy does not show cancer then may need referral to [facility] for recs for biopsy of abdominal mets and will go ahead and treat prostate cancer with ADT for now  Christie Bruce MD  Urology  [facility]

## 2018-06-04 NOTE — PLAN OF CARE
Patient spoke with Dr Bruce prior to discharge. He is aware that he has an upcoming appointment with Dr Aggarwal. He refuses anything to drink at this time.

## 2018-06-05 VITALS
HEIGHT: 70 IN | TEMPERATURE: 98 F | RESPIRATION RATE: 19 BRPM | BODY MASS INDEX: 37.9 KG/M2 | DIASTOLIC BLOOD PRESSURE: 66 MMHG | SYSTOLIC BLOOD PRESSURE: 114 MMHG | HEART RATE: 52 BPM | WEIGHT: 264.75 LBS | OXYGEN SATURATION: 98 %

## 2018-06-13 ENCOUNTER — OFFICE VISIT (OUTPATIENT)
Dept: GASTROENTEROLOGY | Facility: CLINIC | Age: 78
End: 2018-06-13
Payer: MEDICARE

## 2018-06-13 VITALS
HEART RATE: 76 BPM | SYSTOLIC BLOOD PRESSURE: 136 MMHG | BODY MASS INDEX: 37.77 KG/M2 | DIASTOLIC BLOOD PRESSURE: 78 MMHG | WEIGHT: 263.25 LBS

## 2018-06-13 DIAGNOSIS — K59.09 CHRONIC CONSTIPATION: ICD-10-CM

## 2018-06-13 DIAGNOSIS — R93.3 ABNORMAL CT SCAN, COLON: Primary | ICD-10-CM

## 2018-06-13 PROCEDURE — 99999 PR PBB SHADOW E&M-EST. PATIENT-LVL II: CPT | Mod: PBBFAC,,, | Performed by: INTERNAL MEDICINE

## 2018-06-13 PROCEDURE — 99205 OFFICE O/P NEW HI 60 MIN: CPT | Mod: S$GLB,,, | Performed by: INTERNAL MEDICINE

## 2018-06-13 NOTE — PROGRESS NOTES
[facility]     CC: Abnormal CT    HPI 77 y.o. male with recently diagnosed prostate cancer (followed by Dr. Bruce), presents for evaluation of recently diagnosed abnormal CT of abdomen, which shows serosal implants on liver and anterior abdominal wall consistent with metastatic disease with questionable mass of transverse colon. Patient denies abdominal pain, blood in stool or family history of colon cancer. He has not had a prior colonoscopy. He does note several years of moderate constipation, often only having a BM several times a week. He takes Miralax regularly which is effective at producing soft BMs. He has not had weight loss.     Past Medical History:   Diagnosis Date    BPH (benign prostatic hyperplasia)     Wears glasses        Past Surgical History:   Procedure Laterality Date    CHOLECYSTECTOMY      CYSTOSCOPY N/A 2018    Procedure: CYSTOSCOPY;  Surgeon: Christie Bruce MD;  [facility]: [facility];  Service: Urology;  Laterality: N/A;    EYE SURGERY Right     eye lid surgery     HERNIA REPAIR      TONSILLECTOMY, ADENOIDECTOMY      TRANSRECTAL BIOPSY OF PROSTATE WITH ULTRASOUND GUIDANCE N/A 2018    Procedure: TRANSRECTAL ULTRASOUND BIOPSY;  Surgeon: Christie Bruce MD;  Location: [facility];  Service: Urology;  Laterality: N/A;    WISDOM TOOTH EXTRACTION         Social History   Substance Use Topics    Smoking status: Former Smoker     Quit date: 3/12/1988    Smokeless tobacco: Never Used    Alcohol use Yes      Comment: social, at weddings   -No illicits    Family History   Problem Relation Age of Onset    Eczema Neg Hx     Lupus Neg Hx     Psoriasis Neg Hx     Melanoma Neg Hx    -Mother  of breast cancer     Review of Systems  General ROS: negative for - chills, fever or weight loss  Psychological ROS: negative for - hallucination, depression or suicidal ideation  Ophthalmic ROS: negative for - blurry vision, photophobia or eye pain  ENT  ROS: negative for - epistaxis, sore throat or rhinorrhea  Respiratory ROS: no cough, shortness of breath, or wheezing  Cardiovascular ROS: no chest pain or dyspnea on exertion  Gastrointestinal ROS: + constipation, no blood in stool, no vomiting  Genito-Urinary ROS: no dysuria, trouble voiding, or hematuria  Musculoskeletal ROS: negative for - arthralgia, myalgia, weakness  Neurological ROS: no syncope or seizures; no ataxia  Dermatological ROS: negative for pruritis, rash and jaundice    Physical Examination  /78   Pulse 76   Wt 119.4 kg (263 lb 3.7 oz)   BMI 37.77 kg/m²   General appearance: alert, cooperative, no distress  HENT: Normocephalic, atraumatic, neck symmetrical, no nasal discharge   Eyes: conjunctivae/corneas clear, PERRL, EOM's intact, sclera anicteric  Lungs: clear to auscultation bilaterally, no dullness to percussion bilaterally, symmetric expansion, breathing unlabored  Heart: regular rate and rhythm without rub; no displacement of the PMI   Abdomen: obese, soft, nontender,nondistended, BS normoactive, no masses palpated though limited by body habitus  Extremities: extremities symmetric; no clubbing, cyanosis, or edema  Integument: Skin color, texture, turgor normal; no rashes; hair distrubution normal, no jaundice  Neurologic: Alert and oriented X 3, no focal sensory or motor neurologic deficits  Psychiatric: no pressured speech; normal affect; no evidence of impaired cognition, no anxiety/depression     Labs:  Lab Results   Component Value Date    WBC 17.10 (H) 03/04/2018    HGB 15.7 03/04/2018    HCT 46.5 03/04/2018    MCV 87 03/04/2018     03/04/2018     -Normal LFTs    Imaging:  CT urogram 6/1/2018 was independently visualized and reviewed by me and showed serosal implants with metastatic disease, ? Mass vs incomplete distention transverse colon, enlarged prostate, bladder wall thickening, small hiatal hernia    Assessment:   77 y.o. male with newly diagnosed prostate cancer,  presents with abdominal CT showing serosal implants consistent with metastatic disease as well as ? Transverse colonic lesion.    Plan:  -Continue Miralax daily  -Schedule colonoscopy to be done next week    Sally Aggarwal MD  [facility]  [address], [address]  [place] [address]  Office: [phone]  Fax: [phone]

## 2018-06-13 NOTE — LETTER
June 13, 2018      Christie Bruce MD  [address] [place] [address]   [address]  [place] [address]           [facility]  [address]  [place] [address]  Phone: [phone]          Patient: Ramon Reilly   MR Number: [id number]   YOB: 1940   Date of Visit: 6/13/2018       Dear Dr. Christie Bruce:    Thank you for referring Ramon Reilly to me for evaluation. Attached you will find relevant portions of my assessment and plan of care.    If you have questions, please do not hesitate to call me. I look forward to following Ramon Reilly along with you.    Sincerely,    Sally Aggarwal MD    Enclosure  CC:  No Recipients    If you would like to receive this communication electronically, please contact [email or web address] or [phone] to request more information on [facility] access.    For providers and/or their staff who would like to refer a patient to [facility], please contact us through our one-stop-shop provider referral line, [facility], at [phone].    If you feel you have received this communication in error or would no longer like to receive these types of communications, please e-mail [email or web address]

## 2018-06-19 ENCOUNTER — TELEPHONE (OUTPATIENT)
Dept: UROLOGY | Facility: CLINIC | Age: 78
End: 2018-06-19

## 2018-06-19 NOTE — TELEPHONE ENCOUNTER
Spoke with patient he states he had prostate biopsy 2 weeks ago and is scheduled for a colonoscopy on Thursday patient wants to know if he can cut his grass before this procedure.

## 2018-06-19 NOTE — TELEPHONE ENCOUNTER
----- Message from Shwetha Orozco sent at 6/19/2018  2:18 PM CDT -----  Type: Needs Medical Advice    Who Called:  Patient   Best Call Back Number: [phone]  Additional Information: Patient requesting to speak with nurse concerning activity after having procedure/needs advice/please call back to advise.

## 2018-06-20 NOTE — TELEPHONE ENCOUNTER
Yes he can cut his grass     I will see him on June 25 to discuss his results from the biopsy.   Also let him know that I referred him  to hematology/oncology because even if he has a colon mass, because of the abdominal mets he will need to see them. He has f/u with dr herrera ([facility]) on June 27th, please make sure he knows this, this is a very important appointment.

## 2018-06-21 ENCOUNTER — SURGERY (OUTPATIENT)
Age: 78
End: 2018-06-21

## 2018-06-21 ENCOUNTER — ANESTHESIA (OUTPATIENT)
Dept: ENDOSCOPY | Facility: HOSPITAL | Age: 78
End: 2018-06-21
Payer: MEDICARE

## 2018-06-21 ENCOUNTER — HOSPITAL ENCOUNTER (OUTPATIENT)
Facility: HOSPITAL | Age: 78
Discharge: HOME | End: 2018-06-21
Attending: INTERNAL MEDICINE | Admitting: INTERNAL MEDICINE
Payer: MEDICARE

## 2018-06-21 ENCOUNTER — ANESTHESIA EVENT (OUTPATIENT)
Dept: ENDOSCOPY | Facility: HOSPITAL | Age: 78
End: 2018-06-21
Payer: MEDICARE

## 2018-06-21 DIAGNOSIS — K63.5 POLYP OF COLON, UNSPECIFIED PART OF COLON, UNSPECIFIED TYPE: Primary | ICD-10-CM

## 2018-06-21 DIAGNOSIS — R93.89 ABNORMAL CT SCAN: ICD-10-CM

## 2018-06-21 PROBLEM — K57.30 DIVERTICULOSIS OF LARGE INTESTINE WITHOUT HEMORRHAGE: Status: ACTIVE | Noted: 2018-06-21

## 2018-06-21 PROCEDURE — 27201012 HC FORCEPS, HOT/COLD, DISP: Performed by: INTERNAL MEDICINE

## 2018-06-21 PROCEDURE — 45380 COLONOSCOPY AND BIOPSY: CPT | Mod: 59,,, | Performed by: INTERNAL MEDICINE

## 2018-06-21 PROCEDURE — 37000008 HC ANESTHESIA 1ST 15 MINUTES: Performed by: INTERNAL MEDICINE

## 2018-06-21 PROCEDURE — 00811 ANES LWR INTST NDSC NOS: CPT | Performed by: INTERNAL MEDICINE

## 2018-06-21 PROCEDURE — 37000009 HC ANESTHESIA EA ADD 15 MINS: Performed by: INTERNAL MEDICINE

## 2018-06-21 PROCEDURE — 45385 COLONOSCOPY W/LESION REMOVAL: CPT | Performed by: INTERNAL MEDICINE

## 2018-06-21 PROCEDURE — 88305 TISSUE EXAM BY PATHOLOGIST: CPT | Mod: 26,,, | Performed by: PATHOLOGY

## 2018-06-21 PROCEDURE — 63600175 PHARM REV CODE 636 W HCPCS: Performed by: NURSE ANESTHETIST, CERTIFIED REGISTERED

## 2018-06-21 PROCEDURE — 27201089 HC SNARE, DISP (ANY): Performed by: INTERNAL MEDICINE

## 2018-06-21 PROCEDURE — D9220A PRA ANESTHESIA: Mod: ANES,,, | Performed by: ANESTHESIOLOGY

## 2018-06-21 PROCEDURE — 25000003 PHARM REV CODE 250: Performed by: INTERNAL MEDICINE

## 2018-06-21 PROCEDURE — D9220A PRA ANESTHESIA: Mod: CRNA,,, | Performed by: NURSE ANESTHETIST, CERTIFIED REGISTERED

## 2018-06-21 PROCEDURE — 88305 TISSUE EXAM BY PATHOLOGIST: CPT | Performed by: PATHOLOGY

## 2018-06-21 PROCEDURE — 45380 COLONOSCOPY AND BIOPSY: CPT | Performed by: INTERNAL MEDICINE

## 2018-06-21 PROCEDURE — 25000003 PHARM REV CODE 250: Performed by: NURSE ANESTHETIST, CERTIFIED REGISTERED

## 2018-06-21 PROCEDURE — 45385 COLONOSCOPY W/LESION REMOVAL: CPT | Mod: ,,, | Performed by: INTERNAL MEDICINE

## 2018-06-21 RX ORDER — LIDOCAINE HYDROCHLORIDE 10 MG/ML
INJECTION, SOLUTION INTRAVENOUS
Status: DISCONTINUED | OUTPATIENT
Start: 2018-06-21 | End: 2018-06-21

## 2018-06-21 RX ORDER — PROPOFOL 10 MG/ML
VIAL (ML) INTRAVENOUS
Status: DISCONTINUED | OUTPATIENT
Start: 2018-06-21 | End: 2018-06-21

## 2018-06-21 RX ORDER — SODIUM CHLORIDE 9 MG/ML
INJECTION, SOLUTION INTRAVENOUS CONTINUOUS
Status: DISCONTINUED | OUTPATIENT
Start: 2018-06-21 | End: 2018-06-21 | Stop reason: HOSPADM

## 2018-06-21 RX ADMIN — PROPOFOL 20 MG: 10 INJECTION, EMULSION INTRAVENOUS at 09:06

## 2018-06-21 RX ADMIN — LIDOCAINE HYDROCHLORIDE 50 MG: 10 INJECTION, SOLUTION INTRAVENOUS at 09:06

## 2018-06-21 RX ADMIN — PROPOFOL 30 MG: 10 INJECTION, EMULSION INTRAVENOUS at 09:06

## 2018-06-21 RX ADMIN — SODIUM CHLORIDE: 0.9 INJECTION, SOLUTION INTRAVENOUS at 08:06

## 2018-06-21 RX ADMIN — PROPOFOL 120 MG: 10 INJECTION, EMULSION INTRAVENOUS at 09:06

## 2018-06-21 NOTE — OR NURSING
Have you had a colonoscopy LESS THAN 3 years ago?   * If YES, answer these questions*: No, first    1. Did patient have a prior colonic polyp in a previous surveillance/diagnostic colonoscopy and is 18 years or older on date of encounter?    2. Documentation of < 3 year interval since the patients last colonoscopy due to medical reasons (eg., last colonoscopy incomplete, last colonoscopy had inadequate prep, piecemeal removal of adenomas, or last colonoscopy found > 10 adenomas) ?

## 2018-06-21 NOTE — ANESTHESIA PREPROCEDURE EVALUATION
2018  Ramon Reilly is a 77 y.o., male.    Pre-op Assessment    I have reviewed the Patient Summary Reports.     I have reviewed the Nursing Notes.   I have reviewed the Medications.     Review of Systems  Anesthesia Hx:  No problems with previous Anesthesia    Social:  Smoking Status: Former Smoker  Quit Smokin88  Smokeless Tobacco Status: Never Used  Alcohol use: Yes, unspecified volume  Drug use: No           Physical Exam  General:  Morbid Obesity    Airway/Jaw/Neck:  Airway Findings: Mouth Opening: Normal Tongue: Normal  General Airway Assessment: Adult, Good  Mallampati: II  Improves to II with phonation.  TM Distance: 4-6 cm      Dental:  Dental Findings: In tact   Chest/Lungs:  Chest/Lungs Findings: Clear to auscultation, Normal Respiratory Rate     Heart/Vascular:  Heart Findings: Rate: Normal  Rhythm: Regular Rhythm  Sounds: Normal  Heart murmur: negative       Mental Status:  Mental Status Findings:  Cooperative, Alert and Oriented         Anesthesia Plan  Type of Anesthesia, risks & benefits discussed:  Anesthesia Type:  general  Patient's Preference:   Intra-op Monitoring Plan: standard ASA monitors  Intra-op Monitoring Plan Comments:   Post Op Pain Control Plan:   Post Op Pain Control Plan Comments:   Induction:   IV  Beta Blocker:  Patient is not currently on a Beta-Blocker (No further documentation required).       Informed Consent: Patient understands risks and agrees with Anesthesia plan.  Questions answered. Anesthesia consent signed with patient.  ASA Score: 3     Day of Surgery Review of History & Physical: I have interviewed and examined the patient. I have reviewed the patient's H&P dated:  There are no significant changes.  H&P update referred to the surgeon.         Ready For Surgery From Anesthesia Perspective.

## 2018-06-21 NOTE — H&P (VIEW-ONLY)
[facility]     CC: Abnormal CT    HPI 77 y.o. male with recently diagnosed prostate cancer (followed by Dr. Bruce), presents for evaluation of recently diagnosed abnormal CT of abdomen, which shows serosal implants on liver and anterior abdominal wall consistent with metastatic disease with questionable mass of transverse colon. Patient denies abdominal pain, blood in stool or family history of colon cancer. He has not had a prior colonoscopy. He does note several years of moderate constipation, often only having a BM several times a week. He takes Miralax regularly which is effective at producing soft BMs. He has not had weight loss.     Past Medical History:   Diagnosis Date    BPH (benign prostatic hyperplasia)     Wears glasses        Past Surgical History:   Procedure Laterality Date    CHOLECYSTECTOMY      CYSTOSCOPY N/A 2018    Procedure: CYSTOSCOPY;  Surgeon: Christie Bruce MD;  Location: [facility] OR;  Service: Urology;  Laterality: N/A;    EYE SURGERY Right     eye lid surgery     HERNIA REPAIR      TONSILLECTOMY, ADENOIDECTOMY      TRANSRECTAL BIOPSY OF PROSTATE WITH ULTRASOUND GUIDANCE N/A 2018    Procedure: TRANSRECTAL ULTRASOUND BIOPSY;  Surgeon: Christie Bruce MD;  Location: [facility] OR;  Service: Urology;  Laterality: N/A;    WISDOM TOOTH EXTRACTION         Social History   Substance Use Topics    Smoking status: Former Smoker     Quit date: 3/12/1988    Smokeless tobacco: Never Used    Alcohol use Yes      Comment: social, at weddings   -No illicits    Family History   Problem Relation Age of Onset    Eczema Neg Hx     Lupus Neg Hx     Psoriasis Neg Hx     Melanoma Neg Hx    -Mother  of breast cancer     Review of Systems  General ROS: negative for - chills, fever or weight loss  Psychological ROS: negative for - hallucination, depression or suicidal ideation  Ophthalmic ROS: negative for - blurry vision, photophobia or eye pain  ENT  ROS: negative for - epistaxis, sore throat or rhinorrhea  Respiratory ROS: no cough, shortness of breath, or wheezing  Cardiovascular ROS: no chest pain or dyspnea on exertion  Gastrointestinal ROS: + constipation, no blood in stool, no vomiting  Genito-Urinary ROS: no dysuria, trouble voiding, or hematuria  Musculoskeletal ROS: negative for - arthralgia, myalgia, weakness  Neurological ROS: no syncope or seizures; no ataxia  Dermatological ROS: negative for pruritis, rash and jaundice    Physical Examination  /78   Pulse 76   Wt 119.4 kg (263 lb 3.7 oz)   BMI 37.77 kg/m²   General appearance: alert, cooperative, no distress  HENT: Normocephalic, atraumatic, neck symmetrical, no nasal discharge   Eyes: conjunctivae/corneas clear, PERRL, EOM's intact, sclera anicteric  Lungs: clear to auscultation bilaterally, no dullness to percussion bilaterally, symmetric expansion, breathing unlabored  Heart: regular rate and rhythm without rub; no displacement of the PMI   Abdomen: obese, soft, nontender,nondistended, BS normoactive, no masses palpated though limited by body habitus  Extremities: extremities symmetric; no clubbing, cyanosis, or edema  Integument: Skin color, texture, turgor normal; no rashes; hair distrubution normal, no jaundice  Neurologic: Alert and oriented X 3, no focal sensory or motor neurologic deficits  Psychiatric: no pressured speech; normal affect; no evidence of impaired cognition, no anxiety/depression     Labs:  Lab Results   Component Value Date    WBC 17.10 (H) 03/04/2018    HGB 15.7 03/04/2018    HCT 46.5 03/04/2018    MCV 87 03/04/2018     03/04/2018     -Normal LFTs    Imaging:  CT urogram 6/1/2018 was independently visualized and reviewed by me and showed serosal implants with metastatic disease, ? Mass vs incomplete distention transverse colon, enlarged prostate, bladder wall thickening, small hiatal hernia    Assessment:   77 y.o. male with newly diagnosed prostate cancer,  presents with abdominal CT showing serosal implants consistent with metastatic disease as well as ? Transverse colonic lesion.    Plan:  -Continue Miralax daily  -Schedule colonoscopy to be done next week    Sally Aggarwal MD  [facility]  [address], [address]  [place] [address]  Office: [phone]  Fax: [phone]

## 2018-06-21 NOTE — ANESTHESIA POSTPROCEDURE EVALUATION
"Anesthesia Post Evaluation    Patient: Ramon Reilly    Procedure(s) Performed: Procedure(s) (LRB):  COLONOSCOPY (N/A)    Final Anesthesia Type: general  Patient location during evaluation: PACU  Patient participation: Yes- Able to Participate  Level of consciousness: awake and alert and oriented  Post-procedure vital signs: reviewed and stable  Pain management: adequate  Airway patency: patent  PONV status at discharge: No PONV  Anesthetic complications: no      Cardiovascular status: blood pressure returned to baseline  Respiratory status: unassisted, spontaneous ventilation and room air  Hydration status: euvolemic  Follow-up not needed.        Visit Vitals  BP (!) 153/70 (BP Location: Left arm, Patient Position: Lying)   Pulse (!) 54   Temp 37 °C (98.6 °F) (Temporal)   Resp 16   Ht 5' 10" (1.778 m)   Wt 119.7 kg (264 lb)   SpO2 96%   BMI 37.88 kg/m²       Pain/Triston Score: Pain Assessment Performed: Yes (6/21/2018 10:32 AM)  Presence of Pain: denies (6/21/2018 10:32 AM)  Triston Score: 10 (6/21/2018 10:32 AM)      "

## 2018-06-21 NOTE — TRANSFER OF CARE
"Anesthesia Transfer of Care Note    Patient: Ramon Reilly    Procedure(s) Performed: Procedure(s) (LRB):  COLONOSCOPY (N/A)    Patient location: PACU    Anesthesia Type: general    Transport from OR: Transported from OR on 2-3 L/min O2 by NC with adequate spontaneous ventilation    Post pain: adequate analgesia    Post assessment: no apparent anesthetic complications and tolerated procedure well    Post vital signs: stable    Level of consciousness: awake, alert and oriented    Nausea/Vomiting: no nausea/vomiting    Complications: none    Transfer of care protocol was followed      Last vitals:   Visit Vitals  BP (!) 147/72   Pulse 68   Temp 37.1 °C (98.8 °F) (Oral)   Resp 18   Ht 5' 10" (1.778 m)   Wt 119.7 kg (264 lb)   SpO2 95%   BMI 37.88 kg/m²     "

## 2018-06-21 NOTE — DISCHARGE INSTRUCTIONS
Discharge Instructions for Diverticulitis    Diverticulosis    Diverticulosis means that small pouches have formed in the wall of your large intestine (colon). Most often, this problem causes no symptoms and is common as people age. But the pouches in the colon are at risk of becoming infected. When this happens, the condition is called diverticulitis. Although most people with diverticulosis never develop diverticulitis, it is still not uncommon. Rectal bleeding can also occur and in less common situations, a type of colon inflammation called colitis.  While most people do not have symptoms, some people with diverticulosis may have:  · Abdominal cramps and pain  · Bloating  · Constipation  · Change in bowel habits  ·   Causes  The exact cause of diverticulosis (and diverticulitis) has not been proved, but a few things are associated with the condition:  · Low-fiber diet  · Constipation  · Lack of exercise  Your healthcare provider will talk with you about how to manage your condition. Diet changes may be all that are needed to help control diverticulosis and prevent progression to diverticulitis. If you develop diverticulitis, you will likely need other treatments.    Home care  You may be told to take fiber supplements daily. Fiber adds bulk to the stool so that it passes through the colon more easily. Stool softeners may be recommended. You may also be given medications for pain relief. Be sure to take all medications as directed.  In the past, people were told to avoid corn, nuts, and seeds. This is no longer necessary.  Follow these guidelines when caring for yourself at home:  · Eat unprocessed foods that are high in fiber. Whole grains, fruits, and vegetables are good choices.  · Drink 6 to 8 glasses of water every day unless your healthcare provider has you limit how much fluid you should have.  · Watch for changes in your bowel movements. Tell your provider if you notice any changes.  · Begin an exercise  program. Ask your provider how to get started. Generally, walking is the best.  · Get plenty of rest and sleep.  ·   Follow-up care  Follow up with your healthcare provider, or as advised. Regular visits may be needed to check on your health. Sometimes special procedures such as colonoscopy, are needed after an episode of diverticulitis or blooding. Be sure to keep all your appointments.  If a stool sample was taken, or cultures were done, you should be told if they are positive, or if your treatment needs to be changed. You can call as directed for the results.  If X-rays were done, a radiologist will look at them. You will be told if there is a change in your treatment.  If antibiotics were prescribed, be sure to finish them all.    When to seek medical advice  Call your healthcare provider right away if any of these occur:  · Fever of 100.4°F (38°C) or higher, or as directed by your healthcare provider  · Severe cramps in the lower left side of the abdomen or pain that is getting worse  · Tenderness in the lower left side of the abdomen or worsening pain throughout the abdomen  · Diarrhea or constipation that doesn't get better within 24 hours  · Nausea and vomiting  · Bleeding from the rectum  Call 911  Call emergency services if any of the following occur:  · Trouble breathing  · Confusion  · Very drowsy or trouble awakening  · Fainting or loss of consciousness  · Rapid heart rate  · Chest pain  Date Last Reviewed: 12/30/2015  © 3632-1773 [employer]. [address] [place] [address], [place] [id number]. All rights reserved. This information is not intended as a substitute for professional medical care. Always follow your healthcare professional's instructions.        When to call your healthcare provider  Call your healthcare provider immediately if you have any of the following:  · Fever of 100.4°F (38.0°C) or higher, or as directed by your healthcare provider  · Chills  · Severe cramps in the belly, most  commonly the lower left side  · Tenderness in the belly, most commonly the lower left side  · Nausea and vomiting  · Bleeding from your rectum   Date Last Reviewed: 7/1/2016  © 2103-0222 [employer]. [address] [place] [address], [place] [address]. All rights reserved. This information is not intended as a substitute for professional medical care. Always follow your healthcare professional's instructions.        4 Steps for Eating Healthier  Changing the way you eat can improve your health. It can lower your cholesterol and blood pressure, and help you stay at a healthy weight. Your diet doesnt have to be bland and boring to be healthy. Just watch your calories and follow these steps:    1. Eat fewer unhealthy fats  · Choose more fish and lean meats instead of fatty cuts of meat.  · Skip butter and lard, and use less margarine.  · Pass on foods that have palm, coconut, or hydrogenated oils.  · Eat fewer high-fat dairy foods like cheese, ice cream, and whole milk.  · Get a heart-healthy cookbook and try some low-fat recipes.  2. Go light on salt  · Keep the saltshaker off the table.  · Limit high-salt ingredients, such as soy sauce, bouillon, and garlic salt.  · Instead of adding salt when cooking, season your food with herbs and flavorings. Try lemon, garlic, and onion.  · Limit convenience foods, such as boxed or canned foods and restaurant food.  · Read food labels and choose lower-sodium options.  3. Limit sugar  · Pause before you add sugars to pancakes, cereal, coffee, or tea. This includes white and brown table sugar, syrup, honey, and molasses. Cut your usual amount by half.  · Use non-sugar sweeteners. Stevia, aspartame, and sucralose can satisfy a sweet tooth without adding calories.  · Swap out sugar-filled soda and other drinks. Buy sugar-free or low-calorie beverages. Remember water is always the best choice.  · Read labels and choose foods with less added sugar. Keep in mind that dairy foods  and foods with fruit will have some natural sugar.  · Cut the sugar in recipes by 1/3 to 1/2. Boost the flavor with extracts like almond, vanilla, or orange. Or add spices such as cinnamon or nutmeg.  4. Eat more fiber  · Eat fresh fruits and vegetables every day.  · Boost your diet with whole grains. Go for oats, whole-grain rice, and bran.  · Add beans and lentils to your meals.  · Drink more water to match your fiber increase. This is to help prevent constipation.  Date Last Reviewed: 5/11/2015  © 1887-5304 [employer]. [address] [place] [address], [place] [address]. All rights reserved. This information is not intended as a substitute for professional medical care. Always follow your healthcare professional's instructions.      Eating a High-Fiber Diet  Fiber is what gives strength and structure to plants. Most grains, beans, vegetables, and fruits contain fiber. Foods rich in fiber are often low in calories and fat, and they fill you up more. They may also reduce your risks for certain health problems. To find out the amount of fiber in canned, packaged, or frozen foods, read the Nutrition Facts label. It tells you how much fiber is in a serving.    Types of fiber and their benefits  There are two types of fiber: insoluble and soluble. They both aid digestion and help you maintain a healthy weight.  · Insoluble fiber. This is found in whole grains, cereals, certain fruits and vegetables such as apple skin, corn, and carrots. Insoluble fiber may prevent constipation and reduce the risk for certain types of cancer.  · Soluble fiber. This type of fiber is in oats, beans, and certain fruits and vegetables such as strawberries and peas. Soluble fiber can reduce cholesterol, which may help lower the risk for heart disease. It also helps control blood sugar levels.  Look for high-fiber foods  Try these foods to add fiber to your diet:  · Whole-grain breads and cereals. Try to eat 6 to 8 ounces a day. Include  wheat and oat bran cereals, whole-wheat muffins or toast, and corn tortillas in your meals.  · Fruits. Try to eat 2 cups a day. Apples, oranges, strawberries, pears, and bananas are good sources. (Note: Fruit juice is low in fiber.)  · Vegetables. Try to eat at least 2.5 cups a day. Add asparagus, carrots, broccoli, peas, and corn to your meals.  · Beans. One cup of cooked lentils gives you over 15 grams of fiber. Try navy beans, lentils, and chickpeas.  · Seeds. A small handful of seeds gives you about 3 grams of fiber. Try sunflower seeds.  ·   Keep track of your fiber  Keep track of how much fiber you eat. Start by reading food labels. Then eat a variety of foods high in fiber. As you begin to eat more fiber, ask your healthcare provider how much water you should be drinking to keep your digestive system working smoothly.  You should aim for a certain amount of fiber in your diet each day. If you are a woman, that amount is between 25 and 28 grams per day. Men should aim for 30 to 33 grams per day. After age 50, your daily fiber needs drop to 22 grams for women and 28 grams for men.  Before you reach for the fiber supplements, think about this. Fiber is found naturally in healthy whole foods. It gives you that feeling of fullness after you eat. Taking fiber supplements or eating fiber-enriched foods will not give you this full feeling.  Your fiber intake is a good measure for the quality of your overall diet. If you are missing out on your daily amount of fiber, you may be lacking other important nutrients as well.      Date Last Reviewed: 5/11/2015  © 6588-1008 [employer]. [address] [place] [address], [place] [address]. All rights reserved. This information is not intended as a substitute for professional medical care. Always follow your healthcare professional's instructions.      Understanding Diverticulosis and Diverticulitis     Pouches or diverticula usually occur in the lower part of the colon  called the sigmoid.     The colon (large intestine) is the last part of the digestive tract. It absorbs water from stool and changes it from a liquid to a solid. In certain cases, small pouches called diverticula can form in the colon wall. This condition is called diverticulosis. The pouches can become infected. If this happens, it becomes a more serious problem called diverticulitis. These problems can be painful. But they can be managed.    Managing your condition  Diet changes or medicines may be prescribed.   If you have diverticulosis  Recommendations include:  · Diet changes are often enough to control symptoms. The main changes are adding fiber (roughage) and drinking more water. Fiber absorbs water as it travels through your colon. This helps your stool stay soft and move smoothly. Water helps this process.  · If needed, you may be told to take over-the-counter stool softeners.  · To help relieve pain, antispasmodic medicines may be prescribed.  · Watch for changes in your bowel movements. Tell the healthcare provider if you notice any changes.  · Begin an exercise program. Ask your healthcare provider how to get started.  · Get plenty of rest and sleep.   ·   If you have diverticulitis  Treatment depends on how bad your symptoms are.  · For mild symptoms. You may be put on a liquid diet for a short time. Antibiotics are usually prescribed. If these two steps relieve your symptoms, you may then be prescribed a high-fiber diet. If you still have symptoms, your healthcare provider will discuss more treatment choices with you.  · For severe symptoms. You may need to be admitted to the hospital. There, you can be given IV antibiotics and fluids. You will also be put on a low-fiber or liquid diet. Although not common, surgery is needed in some people with severe symptoms.  [place] to colon health     Diverticulitis occurs when the pouches become infected or inflamed.     Help keep your colon healthy with a diet  that includes plenty of high-fiber fruits, vegetables, and whole grains. Drink plenty of liquids like water and juice. Maintain a healthy lifestyle including regular exercise, stress management, and adequate rest and sleep.      Date Last Reviewed: 7/1/2016  © 2874-3089 [employer]. [address] [place] [address]. All rights reserved. This information is not intended as a substitute for professional medical care. Always follow your healthcare professional's instructions.

## 2018-06-21 NOTE — PROVATION PATIENT INSTRUCTIONS
Discharge Summary/Instructions after an Endoscopic Procedure  Patient Name: Ramon Reilly  Patient MRN: [id number]  Patient YOB: 1940  Thursday, June 21, 2018  Sally Aggarwal MD  RESTRICTIONS:  During your procedure today, you received medications for sedation.  These   medications may affect your judgment, balance and coordination.  Therefore,   for 24 hours, you have the following restrictions:   - DO NOT drive a car, operate machinery, make legal/financial decisions,   sign important papers or drink alcohol.    ACTIVITY:  Today: no heavy lifting, straining or running due to procedural   sedation/anesthesia.  The following day: return to full activity including work.  DIET:  Eat and drink normally unless instructed otherwise.     TREATMENT FOR COMMON SIDE EFFECTS:  - Mild abdominal pain, nausea, belching, bloating or excessive gas:  rest,   eat lightly and use a heating pad.  - Sore Throat: treat with throat lozenges and/or gargle with warm salt   water.  - Because air was used during the procedure, expelling large amounts of air   from your rectum or belching is normal.  - If a bowel prep was taken, you may not have a bowel movement for 1-3 days.    This is normal.  SYMPTOMS TO WATCH FOR AND REPORT TO YOUR PHYSICIAN:  1. Abdominal pain or bloating, other than gas cramps.  2. Chest pain.  3. Back pain.  4. Signs of infection such as: chills or fever occurring within 24 hours   after the procedure.  5. Rectal bleeding, which would show as bright red, maroon, or black stools.   (A tablespoon of blood from the rectum is not serious, especially if   hemorrhoids are present.)  6. Vomiting.  7. Weakness or dizziness.  GO DIRECTLY TO THE NEAREST EMERGENCY ROOM IF YOU HAVE ANY OF THE FOLLOWING:      Difficulty breathing              Chills and/or fever over 101 F   Persistent vomiting and/or vomiting blood   Severe abdominal pain   Severe chest pain   Black, tarry stools   Bleeding- more than one  tablespoon   Any other symptom or condition that you feel may need urgent attention  Your doctor recommends these additional instructions:  If any biopsies were taken, your doctors clinic will contact you in 1 to 2   weeks with any results.  - Discharge patient to home (with spouse).   - Patient has a contact number available for emergencies.  The signs and   symptoms of potential delayed complications were discussed with the   patient.  Return to normal activities tomorrow.  Written discharge   instructions were provided to the patient.   - Resume previous diet.   - Resume aspirin at prior dose in 1 week.  Refer to primary physician for   further adjustment of therapy.   - Await pathology results.   - Repeat colonoscopy in 3 years for surveillance based on pathology results.     -Follow up with urology and oncology  - Return to my office PRN.  For questions, problems or results please call your physician - Sally Aggarwal MD at Work:  [phone].  [facility], EMERGENCY ROOM PHONE NUMBER: [phone]  IF A COMPLICATION OR EMERGENCY SITUATION ARISES AND YOU ARE UNABLE TO REACH   YOUR PHYSICIAN - GO DIRECTLY TO THE EMERGENCY ROOM.  Sally Aggarwal MD  6/21/2018 9:51:15 AM  This report has been verified and signed electronically.  PROVATION

## 2018-06-21 NOTE — PLAN OF CARE
Pt. AAOx4, calm and cooperative.  Breathing unlabored on room air, tolerated clear liquids readily and no nausea.  VS wnl.  Dr. Puri met with pt. And wife and answered questions, and stated pt. Is ready for discharge.  RN reviewed discharge instructions with pt. And wife who verbalized understanding, and written instructions given.  Discharged home with wife via wheelchair to lobby.

## 2018-06-22 VITALS
WEIGHT: 264 LBS | HEART RATE: 54 BPM | OXYGEN SATURATION: 96 % | HEIGHT: 70 IN | DIASTOLIC BLOOD PRESSURE: 70 MMHG | RESPIRATION RATE: 16 BRPM | BODY MASS INDEX: 37.8 KG/M2 | TEMPERATURE: 99 F | SYSTOLIC BLOOD PRESSURE: 153 MMHG

## 2018-06-25 ENCOUNTER — OFFICE VISIT (OUTPATIENT)
Dept: UROLOGY | Facility: CLINIC | Age: 78
End: 2018-06-25
Payer: MEDICARE

## 2018-06-25 VITALS
HEIGHT: 70 IN | WEIGHT: 262.38 LBS | HEART RATE: 52 BPM | TEMPERATURE: 98 F | BODY MASS INDEX: 37.56 KG/M2 | DIASTOLIC BLOOD PRESSURE: 74 MMHG | SYSTOLIC BLOOD PRESSURE: 151 MMHG

## 2018-06-25 DIAGNOSIS — R16.0 LIVER MASSES: ICD-10-CM

## 2018-06-25 DIAGNOSIS — C61 PROSTATE CANCER: Primary | ICD-10-CM

## 2018-06-25 DIAGNOSIS — R31.0 GROSS HEMATURIA: ICD-10-CM

## 2018-06-25 DIAGNOSIS — N40.0 BENIGN PROSTATIC HYPERPLASIA, UNSPECIFIED WHETHER LOWER URINARY TRACT SYMPTOMS PRESENT: ICD-10-CM

## 2018-06-25 PROCEDURE — 99999 PR PBB SHADOW E&M-EST. PATIENT-LVL V: CPT | Mod: PBBFAC,,, | Performed by: UROLOGY

## 2018-06-25 PROCEDURE — 99215 OFFICE O/P EST HI 40 MIN: CPT | Mod: 25,S$GLB,, | Performed by: UROLOGY

## 2018-06-25 PROCEDURE — 81002 URINALYSIS NONAUTO W/O SCOPE: CPT | Mod: S$GLB,,, | Performed by: UROLOGY

## 2018-06-25 RX ORDER — ALENDRONATE SODIUM 70 MG/1
70 TABLET ORAL
Qty: 4 TABLET | Refills: 5 | Status: ON HOLD | OUTPATIENT
Start: 2018-06-25 | End: 2018-07-30

## 2018-06-25 RX ORDER — CALCIUM CARBONATE 500(1250)
1 TABLET ORAL DAILY
Qty: 30 TABLET | Refills: 12 | Status: SHIPPED | OUTPATIENT
Start: 2018-06-25 | End: 2023-07-14

## 2018-06-25 RX ORDER — BICALUTAMIDE 50 MG/1
50 TABLET, FILM COATED ORAL DAILY
Qty: 30 TABLET | Refills: 0 | Status: ON HOLD | OUTPATIENT
Start: 2018-06-25 | End: 2018-07-30

## 2018-06-25 RX ORDER — AMOXICILLIN 500 MG/1
CAPSULE ORAL
Refills: 0 | COMMUNITY
Start: 2018-06-12 | End: 2018-06-25 | Stop reason: ALTCHOICE

## 2018-06-25 RX ORDER — SULFAMETHOXAZOLE AND TRIMETHOPRIM 800; 160 MG/1; MG/1
1 TABLET ORAL 2 TIMES DAILY
Qty: 3 TABLET | Refills: 0 | Status: SHIPPED | OUTPATIENT
Start: 2018-06-25 | End: 2018-06-28 | Stop reason: ALTCHOICE

## 2018-06-25 RX ORDER — TAMSULOSIN HYDROCHLORIDE 0.4 MG/1
0.8 CAPSULE ORAL
Qty: 180 CAPSULE | Refills: 3 | Status: SHIPPED | OUTPATIENT
Start: 2018-06-25 | End: 2018-06-28 | Stop reason: ALTCHOICE

## 2018-06-25 NOTE — Clinical Note
He's seeing you wed for implants on abdomen. Not prostate cancer. Not colon cancer. Please see my note

## 2018-06-25 NOTE — PROGRESS NOTES
"[facility] Note - [place]  Staff: MD Janis    Referring provider and please cc: Polo and   PCP:  (leaving), changing [facility]    Chief Complaint: elevated psa    Subjective:        HPI: Ramon Reilly is a 77 y.o. male presents with     Prostate cancer, pT2b, Gl 3+3 all on right, intermediate risk  -referred by  to urology for elevated psa found on workup for enlarged prostate. Pt was started on flomax. seen by antonio as a referral for elevated psa 9.7 in 2/16/18.  LIZZIE by revealed nodular prostate and referred to  who saw him on 3/28/18 and ordered an MRI. No previous MRIs to compare to here but last one in [place] in 2015 and per pt was "low".  -seen by me for the first time 5/29/18 to discuss MRI. LIZZIE with large 1cm nodule.   -ctu 6/1/18 multiple serosal liver lesions and anterior abdominal wall lesions and 5mm non-obstructing rmp stone  Cysto trus biopsy on 6/4/18 showed 55g prostate and 10/17 cores + for Gl 3+3, all on right    Here to discuss results     6/4/18  trus bx: Gl 3+3, 10/17 cores + for Gl 3+3, all on right  5/29/18 11.2  4/25/18 MRI: Pirads 4 posteriormedial to PZ, midgland to apex on the right   1/30/18 9.7    Serosal and abdominal wall implants  -scheduled him with  who did a colonscopy and did not find any colon mass.   -has f/u with  this wed, has not seen them yet  -not explained by his prostate cancer    bph with luts and nocturia  -started on flomax 0.4mg by  in January for weak stream, nocturia 3-4x a night. Now stream is improved and nocturia 1-2x a night. Wife here with him and denies him snoring. pvr by bladder scan: 63cc  -Been experiencing slowing of stream since cysto trus biopsy. trus volume was 55g    IIEF: 5    ECOG Status: 0    Gross hematuria  -had chest pain 3/4/18, found to have cholecystitis and was referred for cholecystectomy.  UA at ER was negative that " day  but when he went home he saw pink urine for 2 days. No uti symptoms. F/u ua on 3/19/18 negative.  -on no blood thinners except for asa, no h/o stones, former smoker (30 pack year history). No stds in the past. '    -workup has been negative  -ctu 6/1/18 multiple serosal liver lesions and anterior abdominal wall lesions and 5mm non-obstructing rmp stone  -5/29/18 cytology and culture negative  -cysto 6/4/18 negative      UA today:1.010/neg  UCx:  5/29/18 Ng, void: neg, cytology: negative  3/19/18 No cx, void: neg  3/4/18  No cx, void: neg    Vasectomy: no    REVIEW OF SYSTEMS:  General ROS: no fevers, no chills  Psychological ROS: no depression  Endocrine ROS: no heat or cold  Respiratory ROS: no SOB  Cardiovascular ROS: no CP  Gastrointestinal ROS: no abdominal pain, no constipation, no diarrhea, no BRBPR  Musculoskeletal ROS: no muscle pain  Neurological ROS: no headaches  Dermatological ROS: no rashes  HEENT: +glasses, no sinus   ROS: per HPI     PMHx:  Past Medical History:   Diagnosis Date    BPH (benign prostatic hyperplasia)     Wears glasses      Kidney stones: No  Cataracts? Beginning      PSHx:  Past Surgical History:   Procedure Laterality Date    CHOLECYSTECTOMY      COLONOSCOPY N/A 6/21/2018    Procedure: COLONOSCOPY;  Surgeon: Sally Aggarwal MD;  [facility];  Service: Endoscopy;  Laterality: N/A;    CYSTOSCOPY N/A 6/4/2018    Procedure: CYSTOSCOPY;  Surgeon: Christie Bruce MD;  Location: [facility];  Service: Urology;  Laterality: N/A;    EYE SURGERY Right     eye lid surgery     HERNIA REPAIR  1990    TONSILLECTOMY, ADENOIDECTOMY      TRANSRECTAL BIOPSY OF PROSTATE WITH ULTRASOUND GUIDANCE N/A 6/4/2018    Procedure: TRANSRECTAL ULTRASOUND BIOPSY;  Surgeon: Christie Bruce MD;  Location: [facility] OR;  Service: Urology;  Laterality: N/A;    WISDOM TOOTH EXTRACTION     umbilical hernia repair with recurrence    Stents/Valves/Foreign Bodies: Yes   Cardiac  Evaluation: No    Screening Studies  Colonoscopy: none     Fam Hx:   malignancies: No  . Gyn malignancies: mother had breast cancer. Mother  a 72 of breast cancer. Father  a 77 of MI  kidney stones: No     Soc Hx:  Former tobacco, quit 1980s.  1.5 pk per day x 30 year  occ alcohol  Lives in [place]  :yes here with wife 20 years  Children: wife has 1  Occupation: retired from [employer] dept and [employer],      Allergies:  Patient has no known allergies.    Medications: reviewed   Anticoagulation: Yes - asa 81mg daily     Objective:     Vitals:    18 1213   BP: (!) 151/74   Pulse: (!) 52   Temp: 98 °F (36.7 °C)   normally sbp 120s      General:WDWN in NAD  Eyes: PERRLA, normal conjunctiva  Respiratory: No increased work on breathing.   Cardiovascular: No obvious extremity edema. Warm and well perfused.   GI: palpation of masses. No tenderness. No hepatosplenomegaly to palpation.  Musculoskeletal: normal range of motion of bilateral upper extremities. Normal muscle strength and tone.  Skin: no obvious rashes or lesions. No tightening of skin noted.  Neurologic: CN grossly normal. Normal sensation.   Psychiatric: awake, alert and oriented x 3. Mood and affect normal. Cooperative.    :  Inspection of anus normal  No scrotal rashes, cysts or lesions  Epididymis normal in size, no tenderness  Testes normal and size, equal size bilaterally, no masses  Urethral meatus normal without discharge  Penis is circumcised,    LIZZIE: 40g gland 1cm prostate nodule right side mid gland towards apex, tenderness. SV not palpable. Normal sphincter tone. No hemhorroids.  No bilateral inguinal hernias noted       LABS REVIEW:      Cr:   Lab Results   Component Value Date    CREATININE 1.0 2018       PATHOLOGY REVIEW:  trus biopsy 18:   PROSTATE BIOPSIES 5., 6., 7., 8., 11., 12., AND 13.:  NO CARCINOMA IDENTIFIED  BIOPSIES OF 1. RIGHT LATERAL BASE, 2. RIGHT BASE MEDIAL, 3. RIGHT MID LATERAL, 4. RIGHT  MID  MEDIAL, 9. RIGHT APEX MEDIAL, 10. RIGHT APEX LATERAL, 14. RIGHT TRANSITION ZONE, AND 15.  RIGHT APICAL NODULE: ADENOCARCINOMA TOTAL GERMAINE SCORE 6 (3+ 3)    Adenocarcinoma involves 30% of specimen 1, 65% of 2, 60% of 3, 60% of 4 (with involvement of both cores), 60% of 9 (involvement of all 3 cores), 80% of 10, 15% of 14, and 75% of 15. The pattern is that of Germaine grade 3. The verysmall tissue fragments grossly described are interpreted as being adjacent connective tissue, as opposed to reflectingadditional core biopsies.    Urine cytology 5/31/18: Negative for malignant cells.    RADIOGRAPHIC REVIEW:  ctu 6/1/18 done for gross hematuria  5mm RMP stone. No renal masses, no hydro  subcm likely cysts on both kidneys  Prostate enlarged with diffuse bladder wall thickening  Small nodular serosal implants on the live, anterior abdominal wall suspicious for metastatic dz  Questionable transverse colon mass     MRI of the prostate with and without contrast at [facility]  Date of Service: 4/25/18    Clinical history: Ramon Reilly is a 77 y.o. male with elevated psa and abnormal LIZZIE     Findings:  Prostate volume: 63mL  Intravesical protrusion: none  Post bx hemhorrage: none  Tumor Localization:    - PZ: pirads 4 posterior to posteromedial PZ of the midgland to apex on the right with epicenter in the midgland. No gross macrocopic EPe noted but does bulge the capsule.   -TZ: pirads 2.     Additional findings:   1. Sv, LN, bones negative.     Impression:   1. findigns c/w pirads 4 1.13mL index lesion.     PIRADS scale:  1 - Very low, clinically significant cancer unlikely to be present  2- Low, clinically significant cancer unlikely to be present  3- Intermediate, the presence of clinically sig cancer is equivocal  4- High, clinically sig cancer likely present  5 - Very High, clinically sig cancer highly likely to be present    Read by: Ba Bryson and Ramon Villalta MD    Please send a copy of the MRI results  to the pt        Assessment:       1. Prostate cancer    2. Benign prostatic hyperplasia, unspecified whether lower urinary tract symptoms present    3. Liver masses    4. Gross hematuria          Plan:     Prostate Cancer, Gl3+3, Intermediate Risk, PIRADS 4 MRI (right mid gland PZ), abdominal wall and liver lining implants  -he has 10/17 cores that are positive for Gl 6, which is low grade prostate cancer. However he also has a psa 11.2 which moves him into the intermediate risk category. He is 79 yo and fairly healthy with minimal comorbidties. At this point would recommend treatment which is radiation for him, would not recommend surgery at his age.  -however, we need to still have his abdominal wall and liver implants evaluated. I do not think this is caused by his intermediate risk prostate cancer as his psa is only 11. He has no pelvic lymphadenopathy or bony lesions to suggest metastatic prostate cancer. He has a f/u with Dr.Sumathi Schwartz, oncology, this Wednesday who will set him up for further testing which may include pet scan and likely a ct guided biopsy by interventional radiology of one of the lesions.   -in the meantime for his prostate cancer, I will start him androgen deprivation therapy. Will write for casodex/bicalutamide to stop testosterone production which feeds prostate cancer. He will take this for 30 days. Then he will return in 2 weeks for a trelstar injection which lasts for 6 months. With his intermediate risk prostate cancer recommend 6 month androgen deprivation therapy.  -will also refer to radiation oncology to discuss radiation treatment and he is already scheduled for gold markers on July 30th (8 weeks after biopsy, has wedding to attend). He will take bactrim twice a day starting the night before (3 pills) and 2 fleets enemas the morning of his procedure. Need a urine sample 1 week beforehand.   -radiation and markers may possibly be postponed depending on what they find out the  abdominal implants and treatment for those.     BPH with increasing weakness of stream  -will increase flomax to 2 pills at night to see if this helps with stream  -return for a uroflow (full bladder) and residual to ensure he is emptying. Previous residual 63cc.     Gross hematuria  -workup was negative. Ctu, cytology, cystoscopy and urine culture all  Negative.     F/u on July 30 for gold markers     Basic instructions simplified for patient:    1. Prostate cancer  -start casodex by mouth once daily for 30 days to stop testosterone  -return in 2 weeks for trelstar injection to stop testosterone. Lasts 6 months. Will plan for testosterone deprivation for 6 months total (so just 1 shot)  -start calcium 1000mg a day  -continue vit D  -take fosamax 70mg once a week (keep bones strong)  -referral to radiation oncology to discuss radiation   -gold markers in preparation for radiation to be done by [facility] at the [facility]. Bactrim and fleets beforehand. See instructions below. Return 1 week beforehand for urine sample  -radiation may be postponed based on where  findings    2. Enlarged prostate  -increase flomax to 2 pills a night  -return for flow study and check if emptying    I spent 60 minutes with the patient of which more than half was spent in direct consultation with the patient in regards to our treatment and plan.      Christie Bruce MD

## 2018-06-25 NOTE — PATIENT INSTRUCTIONS
Basic instructions:    1. Prostate cancer  -start casodex by mouth once daily for 30 days to stop testosterone  -return in 2 weeks for trelstar injection to stop testosterone. Lasts 6 months. Will plan for testosterone deprivation for 6 months total (so just 1 shot)  -start calcium 1000mg a day  -continue vit D  -take fosamax 70mg once a week (keep bones strong)  -referral to radiation oncology to discuss radiation   -gold markers in preparation for radiation to be done by me at the [facility]. Bactrim and fleets beforehand. See instructions below. Return 1 week beforehand for urine sample  -radiation may be postponed based on where  findings    2. Enlarged prostate  -increase flomax to 2 pills a night  -return for flow study and check if emptying      Prostate Cancer, Gl3+3, Intermediate Risk, PIRADS 4 MRI (right mid gland PZ)  -he has 10/17 cores that are positive for Gl 6, which is low grade prostate cancer. However he also has a psa 11.2 which moves him into the intermediate risk category. He is 77 yo and fairly healthy with minimal comorbidties. At this point would recommend treatment which is radiation for him, would not recommend surgery at his age.  -however, we need to still have his abdominal wall and liver implants evaluated. I do not think this is caused by his intermediate risk prostate cancer as his psa is only 11. He has no pelvic lymphadenopathy or bony lesions to suggest metastatic prostate cancer. He has a f/u with Dr.Sumathi Schwartz, oncology, this Wednesday who will set him up for further testing which may include pet scan and likely a ct guided biopsy by interventional radiology of one of the lesions.   -in the meantime for his prostate cancer, I will start him androgen deprivation therapy. Will write for casodex/bicalutamide to stop testosterone production which feeds prostate cancer. He will take this for 30 days. Then he will return in 2 weeks for a trelstar injection which lasts for 6  months. With his intermediate risk prostate cancer recommend 6 month androgen deprivation therapy.  -will also refer to radiation oncology to discuss radiation treatment and he is already scheduled for gold markers on July 30th (8 weeks after biopsy, has wedding to attend). He will take bactrim twice a day starting the night before (3 pills) and 2 fleets enemas the morning of his procedure. Need a urine sample 1 week beforehand.   -radiation and markers may possibly be postponed depending on what they find out the abdominal implants and treatment for those.     BPH with increasing weakness of stream  -will increase flomax to 2 pills at night to see if this helps with stream  -return for a uroflow (full bladder) and residual to ensure he is emptying. Previous residual 63cc.     Gross hematuria  -workup was negative. Ctu, cytology, cystoscopy and urine culture all  Negative.     F/u on July 30 for gold markers

## 2018-06-27 ENCOUNTER — INITIAL CONSULT (OUTPATIENT)
Dept: HEMATOLOGY/ONCOLOGY | Facility: CLINIC | Age: 78
End: 2018-06-27
Payer: MEDICARE

## 2018-06-27 VITALS
HEIGHT: 71 IN | HEART RATE: 56 BPM | BODY MASS INDEX: 36.57 KG/M2 | WEIGHT: 261.25 LBS | SYSTOLIC BLOOD PRESSURE: 153 MMHG | TEMPERATURE: 98 F | RESPIRATION RATE: 16 BRPM | DIASTOLIC BLOOD PRESSURE: 68 MMHG

## 2018-06-27 DIAGNOSIS — R93.89 ABNORMAL CT SCAN: Primary | ICD-10-CM

## 2018-06-27 DIAGNOSIS — E78.00 PURE HYPERCHOLESTEROLEMIA: ICD-10-CM

## 2018-06-27 DIAGNOSIS — C78.7 LIVER METASTASIS: ICD-10-CM

## 2018-06-27 DIAGNOSIS — R97.20 ELEVATED PSA: ICD-10-CM

## 2018-06-27 PROCEDURE — 99204 OFFICE O/P NEW MOD 45 MIN: CPT | Mod: S$GLB,,, | Performed by: INTERNAL MEDICINE

## 2018-06-27 PROCEDURE — 99999 PR PBB SHADOW E&M-EST. PATIENT-LVL III: CPT | Mod: PBBFAC,,, | Performed by: INTERNAL MEDICINE

## 2018-06-27 NOTE — PROGRESS NOTES
A 77-year-old  male sent in consultation by Dr. Bruce.    REASON FOR CONSULTATION:  Lesions in the liver and abdominal wall.    SIGNIFICANT HISTORY:  The patient had elevated PSA, which prompted a biopsy.    Pathology came back with a [place] 3+3, PSA 11.6, prostate cancer.  The patient   had a urogram that showed some abnormality in the liver and in abdominal wall.    It read as 1.5 x 1.2 x 0.9 cm nodular density along the right lateral margin of   the liver with loss of the normal fat plane.  Other couple of small 6-mm nodular   foci in the fat and also an anterior abdominal wall area 1.4- x 0.8-cm nodular   density and this is being read as consistent with serosal implants in the liver   and any implants to anterior abdominal wall with metastatic disease.  The   patient has no symptoms at all.  He is preparing for his granddaughter's   wedding.  He does have dyslipidemia and his newly diagnosed prostate cancer.    The patient did have colonoscopy done for colonic thickening noted on that   urogram CT and this colonoscopy was negative except for a few small adenomatous   polyps.    ALLERGIES:  The patient has no known drug allergies.    MEDICATIONS:  Include Fosamax, Ecotrin, Casodex that was recently started,   Os-Leon, vitamin D3, and Flomax.    SOCIAL HISTORY:  Former smoker.  Alcohol social.  No recreational drug use.  He   is here with his wife today.    FAMILY HISTORY:  Noncontributory.    PREVIOUS SURGICAL HISTORY:  Significant for cystoscopies, transrectal biopsy,   hernia repair, tonsillectomy, adenoidectomy.    PHYSICAL EXAMINATION:  GENERAL:  Obese gentleman.  Comfortable looking patient.  Patient is in no   distress.  Awake, alert and oriented to time, person and place.  No anxiety, or   agitation.      HEENT:  Normal conjunctivae and eyelids.  WNL.  PERRLA 3 to 4 mm.  No icterus,   no pallor, no congestion, and no discharge noted.     NECK:   Supple.  Trachea is central.  No crepitus.   No JVD or masses.    RESPIRATORY:  No intercostal retractions.  No dullness to percussion.  Chest is   clear to auscultation.  No rales, rhonchi or wheezes.  No crepitus.  Good air   entry bilaterally.    CARDIOVASCULAR:  S1 and S2 are normally heard without murmurs or gallops.  All   peripheral pulses are present.    ABDOMEN:  Normal abdomen.  No hepatosplenomegaly.  No free fluid.  Bowel sounds   are present.  No hernia noted. No masses.  No rebound or tenderness.  No   guarding or rigidity.  Umbilicus is midline.    LYMPHATICS:  No axillary, cervical, supraclavicular, submental, or inguinal   lymphadenopathy.    SKIN AND MUSCULOSKELETAL:  There is no evidence of excoriation marks or   ecchymosis.  No rashes.  No cyanosis.  No clubbing.  No joint or skeletal   deformities noted.  Normal range of motion.    NEUROLOGIC:  Higher functions are appropriate.  No cranial nerve deficits.    Normal gait.  Normal strength.  Motor and sensory functions are normal.  Deep   tendon reflexes are normal.    GENITAL AND RECTAL:  Exams are deferred.       LABORATORY DATA:  None recently except from March, which were appropriate.  CT   scan as read above and prostate cancer diagnosis per Pathology.    IMPRESSION:   1.  [place] 3+3 prostate cancer unlikely for this to have gone to abdominal   wall and liver.  2.  Abnormal CT scan suggestive of metastatic disease.  Need to rule out   prostate mets, which is unlikely.  However, this may warrant a biopsy   evaluation.  I will obtain a PET scan first to look for any further lightening   up.  In March of 2018, the patient did have ultrasound of the abdomen that did   not show any liver pathology and the followup CT was CT urogram from which these   above notes were obtained.  Based on the above, a PET scan that may or may not   lead to biopsies is probably the most prudent and most noninvasive way of   approaching this patient.  Return to clinic with above workup.  PET scan first   with  biopsy as necessary.  3.  Continue followup and treatments per Dr. Bruce for prostate cancer and   continue dyslipidemia management.      SSS/HN  dd: 06/27/2018 12:01:37 (CDT)  td: 06/28/2018 02:11:37 (CDT)  Doc ID   #[id number]  Job ID #[id number]    CC:

## 2018-06-27 NOTE — LETTER
June 27, 2018      Christie Bruce MD  [address] [place] [address]   [address]  [facility] [address]           [facility]  [address] [facility], [address]  [place] [address]  Phone: [phone]          Patient: Ramon Reilly   MR Number: [id number]   YOB: 1940   Date of Visit: 6/27/2018       Dear Dr. Christie Bruce:    Thank you for referring Ramon Reilly to me for evaluation. Attached you will find relevant portions of my assessment and plan of care.    If you have questions, please do not hesitate to call me. I look forward to following Ramon Reilly along with you.    Sincerely,    Cony Schwartz MD    Enclosure  CC:  No Recipients    If you would like to receive this communication electronically, please contact [email or web address] or [phone] to request more information on [employer] access.    For providers and/or their staff who would like to refer a patient to [facility], please contact us through our one-stop-shop provider referral line, [facility], at [phone].    If you feel you have received this communication in error or would no longer like to receive these types of communications, please e-mail [email or web address]

## 2018-06-28 ENCOUNTER — OFFICE VISIT (OUTPATIENT)
Dept: RADIATION ONCOLOGY | Facility: CLINIC | Age: 78
End: 2018-06-28
Payer: MEDICARE

## 2018-06-28 ENCOUNTER — TELEPHONE (OUTPATIENT)
Dept: HEMATOLOGY/ONCOLOGY | Facility: CLINIC | Age: 78
End: 2018-06-28

## 2018-06-28 ENCOUNTER — DOCUMENTATION ONLY (OUTPATIENT)
Dept: RADIATION ONCOLOGY | Facility: CLINIC | Age: 78
End: 2018-06-28

## 2018-06-28 ENCOUNTER — TELEPHONE (OUTPATIENT)
Dept: UROLOGY | Facility: CLINIC | Age: 78
End: 2018-06-28

## 2018-06-28 VITALS
WEIGHT: 260 LBS | BODY MASS INDEX: 36.78 KG/M2 | SYSTOLIC BLOOD PRESSURE: 144 MMHG | HEART RATE: 88 BPM | DIASTOLIC BLOOD PRESSURE: 76 MMHG

## 2018-06-28 DIAGNOSIS — C61 ADENOCARCINOMA OF PROSTATE: ICD-10-CM

## 2018-06-28 DIAGNOSIS — R93.89 ABNORMAL CT SCAN: Primary | ICD-10-CM

## 2018-06-28 PROCEDURE — 99204 OFFICE O/P NEW MOD 45 MIN: CPT | Mod: ,,, | Performed by: RADIOLOGY

## 2018-06-28 RX ORDER — ALFUZOSIN HYDROCHLORIDE 10 MG/1
10 TABLET, EXTENDED RELEASE ORAL
Qty: 90 TABLET | Refills: 3 | Status: ON HOLD | OUTPATIENT
Start: 2018-06-28 | End: 2018-07-30

## 2018-06-28 NOTE — TELEPHONE ENCOUNTER
----- Message from Janene Butcher sent at 6/28/2018 11:21 AM CDT -----  pls inform dr that pt will have pet scan 7/6 at [facility]..[phone]

## 2018-06-28 NOTE — PATIENT INSTRUCTIONS
Radiation to the male pelvis and skin care instruction sheet given along with [facility]OR[facility] Booklet. Bowel and bladder prep instructions given.  Patient verbalized understanding.

## 2018-06-28 NOTE — PROGRESS NOTES
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 Cholelithiasis.  2. Hepatomegaly and diffuse fatty infiltration of the liver.  *4/18 MRI P  PIRADS-4; posteriormedial to PZ, midgland to apex on the right  *6/18 CT UROGRAM  -Findings consistent with serosal implants on the liver and implant anterior abdominal wall and metastatic disease.  Questionable mass versus incomplete distention transverse colon.  Recommend further evaluation/workup.  -Additional findings as detailed above including enlarged prostate gland, diffuse bladder wall thickening, small hiatal hernia.  Right renal stone  *6/18 C-scope  1. Cecal polyp:  Tubular adenoma with no evidence of high-grade dysplasia.  2. Descending polyp:  Fragmented tubulovillous adenoma with no evidence of high-grade dysplasia.  *Dr. Bruce TRUS bx   - 3+3 adenoca (high volume) @ RIGHT LATERAL BASE, RIGHT BASE MEDIAL, RIGHT MID LATERAL, RIGHT MID MEDIAL, RIGHT APEX MEDIAL, RIGHT APEX LATERAL, RIGHT  TRANSITION ZONE, AND RIGHT APICAL NODULE   - 10/19 cores   - palpable 1cm nodule; recommend RT given age; ADT started  *Dr. Schwartz: PET/CT eval; +/- bx    AUA 6  QOL 2  IIEF 6    At today's visit patient denies fever, chills, chest pain, shortness of breath. He does endorse a dry cough but denies hemoptysis. He reports approximate 30 pound weight loss associated with the move at the end of 2017. Patient does endorse frequency and nocturia of urine x 3 but denies dysuria, hematuria, bone pain, bright red blood per rectum. He is present taking Flomax once daily but has increased this to twice daily since his biopsy. He denies nausea, vomiting,  diarrhea. He has a previous smoking history of approximate 30 pack years but quit approximate 30 years ago. As above he has a clear colonoscopy. He has a PET CT scan pending.    Review of Systems   Constitutional: Negative for appetite change, chills, fever and unexpected weight change.   HENT:   Negative for lump/mass, mouth sores, sore throat, trouble swallowing and voice change.    Eyes: Negative for eye problems and icterus.   Respiratory: Positive for cough. Negative for chest tightness, hemoptysis and shortness of breath.    Cardiovascular: Negative for chest pain and leg swelling.   Gastrointestinal: Negative for abdominal distention, abdominal pain, blood in stool, constipation, diarrhea, nausea and vomiting.   Genitourinary: Positive for difficulty urinating, frequency and nocturia. Negative for bladder incontinence, dysuria and hematuria.    Musculoskeletal: Negative for back pain, gait problem, neck pain and neck stiffness.   Neurological: Negative for extremity weakness, gait problem, headaches, numbness and seizures.   Hematological: Negative for adenopathy.     Past Medical History:   Diagnosis Date    BPH (benign prostatic hyperplasia)     Wears glasses      Past Surgical History:   Procedure Laterality Date    CHOLECYSTECTOMY      COLONOSCOPY N/A 6/21/2018    Procedure: COLONOSCOPY;  Surgeon: Sally Aggarwal MD;  Location: [facility];  Service: Endoscopy;  Laterality: N/A;    CYSTOSCOPY N/A 6/4/2018    Procedure: CYSTOSCOPY;  Surgeon: Christie Bruce MD;  Location: [facility];  Service: Urology;  Laterality: N/A;    EYE SURGERY Right     eye lid surgery     HERNIA REPAIR  1990    TONSILLECTOMY, ADENOIDECTOMY      TRANSRECTAL BIOPSY OF PROSTATE WITH ULTRASOUND GUIDANCE N/A 6/4/2018    Procedure: TRANSRECTAL ULTRASOUND BIOPSY;  Surgeon: Christie Bruce MD;  Location: [facility];  Service: Urology;  Laterality: N/A;    WISDOM TOOTH EXTRACTION       Social History     Social  History    Marital status:      Spouse name: N/A    Number of children: N/A    Years of education: N/A     Social History Main Topics    Smoking status: Former Smoker     Quit date: 3/12/1988    Smokeless tobacco: Never Used    Alcohol use Yes      Comment: social, at weddings    Drug use: No    Sexual activity: No     Other Topics Concern    None     Social History Narrative    None     Family History   Problem Relation Age of Onset    Eczema Neg Hx     Lupus Neg Hx     Psoriasis Neg Hx     Melanoma Neg Hx        PRIOR HISTORY OF CHEMOTHERAPY OR RADIOTHERAPY: Please see HPI for patients prior oncologic history.    Medication List with Changes/Refills   Current Medications    ALENDRONATE (FOSAMAX) 70 MG TABLET    Take 1 tablet (70 mg total) by mouth every 7 days.    ASPIRIN (ECOTRIN) 81 MG EC TABLET    Take 81 mg by mouth once daily.    BICALUTAMIDE (CASODEX) 50 MG TAB    Take 1 tablet (50 mg total) by mouth once daily.    CALCIUM CARBONATE (OS-IDA) 500 MG CALCIUM (1,250 MG) TABLET    Take 1 tablet (500 mg total) by mouth once daily.    CHOLECALCIFEROL, VITAMIN D3, (VITAMIN D3) 1,000 UNIT CAPSULE    Take 1,000 Units by mouth once daily.    TAMSULOSIN (FLOMAX) 0.4 MG CP24    Take 2 capsules (0.8 mg total) by mouth after dinner.     Review of patient's allergies indicates:  No Known Allergies    QUALITY OF LIFE: 90%- Able to Carry on Normal Activity: Minor Symptoms of Disease    Vitals:    06/28/18 0913   BP: (!) 144/76   Pulse: 88   Weight: 117.9 kg (260 lb)   PainSc: 0-No pain       PHYSICAL EXAM:   GENERAL: alert; in no apparent distress.   HEAD: normocephalic, atraumatic.  EYES: pupils are equal, round, reactive to light and accommodation. Sclera anicteric. Conjunctiva not injected.   NOSE/THROAT: no nasal erythema or rhinorrhea. Oropharynx pink, without erythema, ulcerations or thrush.   NECK: no cervical motion rigidity; supple with no masses.  CHEST: Patient is speaking comfortably on room  air with normal work of breathing without using accessory muscles of respiration.  MUSCULOSKELETAL: no tenderness to palpation along the spine or scapulae. Normal range of motion.  NEUROLOGIC: cranial nerves II-XII intact bilaterally. Strength 5/5 in bilateral upper and lower extremities. No sensory deficits appreciated. Normal gait.  EXTREMITIES: no clubbing, cyanosis, edema.  SKIN: no erythema, rashes, ulcerations noted.     REVIEW OF IMAGING/PATHOLOGY/LABS: Please see HPI. All images reviewed personally by dictating physician.     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  PLAN:   Mr. Reilly underwent workup for suspected prostate cancer secondary to an elevated PSA level and was appreciated as above to have a right apical nodule was discovered to have high-volume Borup 6 disease. Given his PSA level over 10 he was given an intermediate risk designation. To further clarify this designation I used the [facility] nomogram as below.  15yr PCSS 99%  10yr bPFS 74%  OC  38%  EPE  62%  LN+  3%  SVI  3%    The nomogram does bear out the intermediate risk of extraprostatic disease. In the absence of any bone pain, I agree with Dr. Bruce that the patient does not require CT abdomen/pelvis or bone scan for completion staging. However he does have incidental discovery on CT urogram of liver and abdominal wall implants concerning for metastases. I also agree that this is unlikely due to his prostate cancer. He has met with Dr. Schwartz and has a PET CT scan scheduled and potentially may need biopsy of these lesions. Depending on these results the treatment plan outlined below for his prostate cancer may be altered.    The patient was thought to be a poor surgical candidate by Dr. Bruce's evaluation and he has been started on androgen deprivation therapy by way of Casodex with  intention to begin Lupron at the end of the month. I carefully explained to the patient that radiotherapy traditionally done with short term hormone deprivation yields the same oncologic result as surgery in patients with intermediate risk disease and I provided him data from the nomogram above regarding expected survival. We then discussed the process of radiotherapy consisting of gold fiducial placement approximate 6 weeks after initiation of Lupron, followed one week later by CT simulation and daily radiation treatments which I would recommend to a dose of 7740 cGy in 43 fractions. We briefly discussed the role of interstitial brachytherapy and the patient is averse given the surgical risk. Additionally we discussed hypo-fractionated radiotherapy and immature data as of yet.    We will await the results of PET CT scan and further workup but I assured the patient that his hormone deprivation has placed prostate cancer under therapy. Regarding definitive radiotherapy, all the patient's questions were addressed today and he would like to proceed. I will carbon copy of my note to Farhad Schwartz and Janis to coordinate care.    We discussed the risks and benefits of the above treatment and have gone over in detail the acute and late toxicities of radiation therapy to the prostate. The patient expressed  understanding and has signed a consent form which is included in the patients chart. The patient has our contact information and understands that they are free to contact us at any time with questions or concerns regarding radiation therapy.    DISPOSITION: RTC FOR CT SIM one week after fiducial placement    I have personally seen and evaluated this patient. Greater than 50% of this time was spent discussing coordination of care and/or counseling.    PHYSICIAN: Jose Antonio Che Jr, MD

## 2018-06-28 NOTE — PROGRESS NOTES
Ramon Reilly  [id number]  1940  6/28/2018  No referring provider defined for this encounter.    DIAGNOSIS: Cancer Staging  Adenocarcinoma of prostate  Staging form: Prostate, AJCC 8th Edition  - Clinical: cT2a, cN0, PSA: 11.2, Grade Group: 1 - Signed by Jsoe Antonio Che Jr., MD on 6/28/2018      TREATMENT SITE(S): prostate/prox SV    INTENT: CURATIVE    TREATMENT SETTING: RT ALONE+ADT     MODALITY: PHOTON    TECHNIQUE:  INTENSITY MODULATED RADIOTHERAPY (IMRT)    IMRT MEDICAL NECESSITY: Target volume irregular shape/proximate to critical structures, Narrow margins needed to protect adjacent structures, High precision necessary, Conventional planning maneuvers insufficient and Concave target volume with critical tissues in concavity    I have personally performed treatment planning for the patient, reviewing relevant history/physical and imaging. I have defined GTV, CTV, PTV and organs at risk.     In order to accomplish this plan, I am ordering:  SIMULATION: CT SIMULATION FOR PLACEMENT OF TREATMENT FIELDS    CONTRAST: IV    TO ACCOMPLISH REPRODUCIBLE POSITION: VACLOC and FULL BLADDER    DEVICES FOR BEAM SHAPING: CUSTOMIZED MLC    CUSTOMIZED BOLUS: none    IMAGING: DAILY KV/KV OBI and CBCT WEEKLY    I have ordered a weekly physics check.    SPECIAL PHYSICS CONSULT: NO  REASON: N/A    SPECIAL TREATMENT CIRCUMSTANCE: NO  Concurrent or recent administration of chemotherapeutic agents which are known potent radiosensitizers and thus will require vigilant monitoring for exaggerated radiation toxicities.    LABS: PSA LAST WEEK OF RT    ANTICIPATED PRESCRIPTION TO ISOCENTER: 7020cGy/39frx to prostate/prox SV + 720cGy/4frx to prostate (total 7740cGy)    ENERGY: 6X    TREATMENT: DAILY    PHYSICIAN: Jose Antonio Che Jr, MD

## 2018-06-28 NOTE — TELEPHONE ENCOUNTER
flomax not covered but uroxatral is  Writing for this  Take 1 at night. Let him know  To help him urinate better, better flow for enlarged prostate (not prostate cancer)

## 2018-06-29 NOTE — TELEPHONE ENCOUNTER
Phoned patient he states he has a 90 day supply of flomax and has already started taking medication. Will cancel prescription for uroxatral

## 2018-07-06 DIAGNOSIS — C61 MALIGNANT NEOPLASM OF PROSTATE: Primary | ICD-10-CM

## 2018-07-09 ENCOUNTER — LAB VISIT (OUTPATIENT)
Dept: LAB | Facility: HOSPITAL | Age: 78
End: 2018-07-09
Attending: INTERNAL MEDICINE
Payer: MEDICARE

## 2018-07-09 ENCOUNTER — CLINICAL SUPPORT (OUTPATIENT)
Dept: UROLOGY | Facility: CLINIC | Age: 78
End: 2018-07-09
Payer: MEDICARE

## 2018-07-09 DIAGNOSIS — C61 PROSTATE CANCER: Primary | ICD-10-CM

## 2018-07-09 DIAGNOSIS — C78.7 LIVER METASTASIS: ICD-10-CM

## 2018-07-09 LAB
AFP SERPL-MCNC: 2.8 NG/ML
ALBUMIN SERPL BCP-MCNC: 4 G/DL
ALP SERPL-CCNC: 68 U/L
ALT SERPL W/O P-5'-P-CCNC: 15 U/L
ANION GAP SERPL CALC-SCNC: 6 MMOL/L
AST SERPL-CCNC: 14 U/L
BASOPHILS # BLD AUTO: 0 K/UL
BASOPHILS NFR BLD: 0.5 %
BILIRUB SERPL-MCNC: 0.7 MG/DL
BUN SERPL-MCNC: 18 MG/DL
CALCIUM SERPL-MCNC: 9.7 MG/DL
CHLORIDE SERPL-SCNC: 103 MMOL/L
CO2 SERPL-SCNC: 27 MMOL/L
CREAT SERPL-MCNC: 1 MG/DL
DIFFERENTIAL METHOD: ABNORMAL
EOSINOPHIL # BLD AUTO: 0.1 K/UL
EOSINOPHIL NFR BLD: 1.1 %
ERYTHROCYTE [DISTWIDTH] IN BLOOD BY AUTOMATED COUNT: 13.3 %
EST. GFR  (AFRICAN AMERICAN): >60 ML/MIN/1.73 M^2
EST. GFR  (NON AFRICAN AMERICAN): >60 ML/MIN/1.73 M^2
GLUCOSE SERPL-MCNC: 101 MG/DL
HCT VFR BLD AUTO: 42.4 %
HGB BLD-MCNC: 14.3 G/DL
LYMPHOCYTES # BLD AUTO: 1.5 K/UL
LYMPHOCYTES NFR BLD: 19.3 %
MCH RBC QN AUTO: 29.4 PG
MCHC RBC AUTO-ENTMCNC: 33.7 G/DL
MCV RBC AUTO: 87 FL
MONOCYTES # BLD AUTO: 0.7 K/UL
MONOCYTES NFR BLD: 9 %
NEUTROPHILS # BLD AUTO: 5.5 K/UL
NEUTROPHILS NFR BLD: 70.1 %
PLATELET # BLD AUTO: 202 K/UL
PMV BLD AUTO: 8.4 FL
POTASSIUM SERPL-SCNC: 4.8 MMOL/L
PROT SERPL-MCNC: 7.5 G/DL
RBC # BLD AUTO: 4.86 M/UL
SODIUM SERPL-SCNC: 136 MMOL/L
WBC # BLD AUTO: 7.9 K/UL

## 2018-07-09 PROCEDURE — 36415 COLL VENOUS BLD VENIPUNCTURE: CPT

## 2018-07-09 PROCEDURE — 99999 PR PBB SHADOW E&M-EST. PATIENT-LVL I: CPT | Mod: PBBFAC,,,

## 2018-07-09 PROCEDURE — 51741 ELECTRO-UROFLOWMETRY FIRST: CPT | Mod: 26,S$GLB,, | Performed by: UROLOGY

## 2018-07-09 PROCEDURE — 96402 CHEMO HORMON ANTINEOPL SQ/IM: CPT | Mod: S$GLB,,, | Performed by: UROLOGY

## 2018-07-09 PROCEDURE — 80053 COMPREHEN METABOLIC PANEL: CPT

## 2018-07-09 PROCEDURE — 82105 ALPHA-FETOPROTEIN SERUM: CPT

## 2018-07-09 PROCEDURE — 85025 COMPLETE CBC W/AUTO DIFF WBC: CPT

## 2018-07-09 PROCEDURE — 99499 UNLISTED E&M SERVICE: CPT | Mod: S$GLB,,, | Performed by: UROLOGY

## 2018-07-09 NOTE — PROGRESS NOTES
Per  patient in clinic today for uroflow and pvr and trelstar injection.  Uroflow results (date: 07/09/2018) on  : Voiding time: 22.8s, Flow time: 20.5s, TTP flow: 3.8s, Peak flowrate: 13.7 mL/s, Average flowrate: 8.0mL/s, Intervals: 4,  Voided volume: 163 mL,  Pvr by bladder scan: 208. Pattern of curve: bell with slightly delayed empty    Need to repeat pvr    Per  patient received Trelstar 22.5mg IM in right gluteal patient tolerated well no adverse reaction noted.

## 2018-07-11 ENCOUNTER — OFFICE VISIT (OUTPATIENT)
Dept: HEMATOLOGY/ONCOLOGY | Facility: CLINIC | Age: 78
End: 2018-07-11
Payer: MEDICARE

## 2018-07-11 VITALS
TEMPERATURE: 98 F | HEART RATE: 59 BPM | RESPIRATION RATE: 18 BRPM | BODY MASS INDEX: 36.54 KG/M2 | DIASTOLIC BLOOD PRESSURE: 64 MMHG | WEIGHT: 261 LBS | HEIGHT: 71 IN | SYSTOLIC BLOOD PRESSURE: 136 MMHG

## 2018-07-11 DIAGNOSIS — R16.0 LIVER MASS: Primary | ICD-10-CM

## 2018-07-11 DIAGNOSIS — E78.00 PURE HYPERCHOLESTEROLEMIA: ICD-10-CM

## 2018-07-11 DIAGNOSIS — C61 ADENOCARCINOMA OF PROSTATE: ICD-10-CM

## 2018-07-11 PROCEDURE — 99999 PR PBB SHADOW E&M-EST. PATIENT-LVL III: CPT | Mod: PBBFAC,,, | Performed by: INTERNAL MEDICINE

## 2018-07-11 PROCEDURE — 99214 OFFICE O/P EST MOD 30 MIN: CPT | Mod: S$GLB,,, | Performed by: INTERNAL MEDICINE

## 2018-07-11 NOTE — PROGRESS NOTES
A 77-year-old  male sent in consultation by Dr. Bruce.    REASON FOR CONSULTATION:  Lesions in the liver and abdominal wall.    SIGNIFICANT HISTORY:  The patient had elevated PSA, which prompted a biopsy.    Pathology came back with a Palo 3+3, PSA 11.6, prostate cancer.  The patient   had a urogram that showed some abnormality in the liver and in abdominal wall.    It read as 1.5 x 1.2 x 0.9 cm nodular density along the right lateral margin of   the liver with loss of the normal fat plane.  Other couple of small 6-mm nodular   foci in the fat and also an anterior abdominal wall area 1.4- x 0.8-cm nodular   density and this is being read as consistent with serosal implants in the liver   and any implants to anterior abdominal wall with metastatic disease.  The   patient has no symptoms at all.  He is preparing for his granddaughter's   wedding.  He does have dyslipidemia and his newly diagnosed prostate cancer.    The patient did have colonoscopy done for colonic thickening noted on that   urogram CT and this colonoscopy was negative except for a few small adenomatous   Polyps.we did a PET to look for bx able areas he is here to discuss this    ALLERGIES:  The patient has no known drug allergies.    MEDICATIONS:  Include Fosamax, Ecotrin, Casodex that was recently started,   Os-Leon, vitamin D3, and Flomax.    SOCIAL HISTORY:  Former smoker.  Alcohol social.  No recreational drug use.  He   is here with his wife today.    FAMILY HISTORY:  Noncontributory.    PREVIOUS SURGICAL HISTORY:  Significant for cystoscopies, transrectal biopsy,   hernia repair, tonsillectomy, adenoidectomy.    PHYSICAL EXAMINATION:  GENERAL:  Obese gentleman.  Comfortable looking patient.  Patient is in no   distress.  Awake, alert and oriented to time, person and place.  No anxiety, or   agitation.      HEENT:  Normal conjunctivae and eyelids.  WNL.  PERRLA 3 to 4 mm.  No icterus,   no pallor, no congestion, and no discharge  noted.     NECK:   Supple.  Trachea is central.  No crepitus.  No JVD or masses.    RESPIRATORY:  No intercostal retractions.  No dullness to percussion.  Chest is   clear to auscultation.  No rales, rhonchi or wheezes.  No crepitus.  Good air   entry bilaterally.    CARDIOVASCULAR:  S1 and S2 are normally heard without murmurs or gallops.  All   peripheral pulses are present.    ABDOMEN:  Normal abdomen.  No hepatosplenomegaly.  No free fluid.  Bowel sounds   are present.  No hernia noted. No masses.  No rebound or tenderness.  No   guarding or rigidity.  Umbilicus is midline.    LYMPHATICS:  No axillary, cervical, supraclavicular, submental, or inguinal   lymphadenopathy.    SKIN AND MUSCULOSKELETAL:  There is no evidence of excoriation marks or   ecchymosis.  No rashes.  No cyanosis.  No clubbing.  No joint or skeletal   deformities noted.  Normal range of motion.    NEUROLOGIC:  Higher functions are appropriate.  No cranial nerve deficits.    Normal gait.  Normal strength.  Motor and sensory functions are normal.  Deep   tendon reflexes are normal.    GENITAL AND RECTAL:  Exams are deferred.       Lab Results   Component Value Date    WBC 7.90 07/09/2018    HGB 14.3 07/09/2018    HCT 42.4 07/09/2018    MCV 87 07/09/2018     07/09/2018     CMP  Sodium   Date Value Ref Range Status   07/09/2018 136 136 - 145 mmol/L Final     Potassium   Date Value Ref Range Status   07/09/2018 4.8 3.5 - 5.1 mmol/L Final     Chloride   Date Value Ref Range Status   07/09/2018 103 95 - 110 mmol/L Final     CO2   Date Value Ref Range Status   07/09/2018 27 23 - 29 mmol/L Final     Glucose   Date Value Ref Range Status   07/09/2018 101 70 - 110 mg/dL Final     BUN, Bld   Date Value Ref Range Status   07/09/2018 18 8 - 23 mg/dL Final     Creatinine   Date Value Ref Range Status   07/09/2018 1.0 0.5 - 1.4 mg/dL Final     Calcium   Date Value Ref Range Status   07/09/2018 9.7 8.7 - 10.5 mg/dL Final     Total Protein   Date Value  Ref Range Status   07/09/2018 7.5 6.0 - 8.4 g/dL Final     Albumin   Date Value Ref Range Status   07/09/2018 4.0 3.5 - 5.2 g/dL Final     Total Bilirubin   Date Value Ref Range Status   07/09/2018 0.7 0.1 - 1.0 mg/dL Final     Comment:     For infants and newborns, interpretation of results should be based  on gestational age, weight and in agreement with clinical  observations.  Premature Infant recommended reference ranges:  Up to 24 hours.............<8.0 mg/dL  Up to 48 hours............<12.0 mg/dL  3-5 days..................<15.0 mg/dL  6-29 days.................<15.0 mg/dL       Alkaline Phosphatase   Date Value Ref Range Status   07/09/2018 68 55 - 135 U/L Final     AST   Date Value Ref Range Status   07/09/2018 14 10 - 40 U/L Final     ALT   Date Value Ref Range Status   07/09/2018 15 10 - 44 U/L Final     Anion Gap   Date Value Ref Range Status   07/09/2018 6 (L) 8 - 16 mmol/L Final     eGFR if    Date Value Ref Range Status   07/09/2018 >60 >60 mL/min/1.73 m^2 Final     eGFR if non    Date Value Ref Range Status   07/09/2018 >60 >60 mL/min/1.73 m^2 Final     Comment:     Calculation used to obtain the estimated glomerular filtration  rate (eGFR) is the CKD-EPI equation.      IMPRESSION:    1. Perihepatic soft tissue nodules are hypermetabolic and consistent with  metastatic disease. These are not significantly changed in size compared to the  reference exam.  2. Subcutaneous soft tissue abnormality involving the anterior abdominal wall  demonstrates low level FDG activity is felt to reflect an inflammatory process  rather than metastatic disease, although the latter cannot be entirely excluded  and attention on follow-up is recommended.  3. 2 - 3 mm right lung pulmonary nodules are too small for PET characterization.  4. Incidental findings as above.    Read and electronically signed by: Trae Schwartz MD on 7/6/2018 2:24   IMPRESSION:   1.  G[place] 3+3 prostate cancer  unlikely for this to have gone to abdominal   wall and liver.  2.  PET showing perihepatic masses. Will get IR bx of these. rtc with bx results  3.  Continue followup and treatments per Dr. Bruce for prostate cancer and   continue dyslipidemia management.

## 2018-07-12 ENCOUNTER — CLINICAL SUPPORT (OUTPATIENT)
Dept: UROLOGY | Facility: CLINIC | Age: 78
End: 2018-07-12
Payer: MEDICARE

## 2018-07-12 DIAGNOSIS — N40.0 BENIGN PROSTATIC HYPERPLASIA, UNSPECIFIED WHETHER LOWER URINARY TRACT SYMPTOMS PRESENT: Primary | ICD-10-CM

## 2018-07-12 PROCEDURE — 51741 ELECTRO-UROFLOWMETRY FIRST: CPT | Mod: 26,S$GLB,, | Performed by: UROLOGY

## 2018-07-12 PROCEDURE — 99499 UNLISTED E&M SERVICE: CPT | Mod: S$GLB,,, | Performed by: UROLOGY

## 2018-07-12 NOTE — PROGRESS NOTES
Per  patient in clinic today for uroflow and pvr.  Uroflow results (date: 07/12/2018) on flomax 0.8mg nightly   : Voiding time: 32.0s, Flow time: 31.9s, TTP flow: 3.6s, Peak flowrate: 9.4 mL/s, Average flowrate: 4.0mL/s, Intervals: 1,  Voided volume: 128 mL,  Pvr by bladder scan: 76ml. Pattern of curve: bell with delayed drainage

## 2018-07-25 ENCOUNTER — TELEPHONE (OUTPATIENT)
Dept: UROLOGY | Facility: CLINIC | Age: 78
End: 2018-07-25

## 2018-07-25 NOTE — TELEPHONE ENCOUNTER
Patient scheduled for gold markers on Monday was never scheduled for urine sample. Not sure if you stated patient did not need one. Please advise

## 2018-07-26 ENCOUNTER — APPOINTMENT (OUTPATIENT)
Dept: LAB | Facility: HOSPITAL | Age: 78
End: 2018-07-26
Attending: UROLOGY
Payer: MEDICARE

## 2018-07-26 ENCOUNTER — OFFICE VISIT (OUTPATIENT)
Dept: SURGERY | Facility: CLINIC | Age: 78
End: 2018-07-26
Payer: MEDICARE

## 2018-07-26 ENCOUNTER — CLINICAL SUPPORT (OUTPATIENT)
Dept: UROLOGY | Facility: CLINIC | Age: 78
End: 2018-07-26
Payer: MEDICARE

## 2018-07-26 VITALS
WEIGHT: 261.44 LBS | DIASTOLIC BLOOD PRESSURE: 81 MMHG | BODY MASS INDEX: 36.6 KG/M2 | HEIGHT: 71 IN | HEART RATE: 53 BPM | SYSTOLIC BLOOD PRESSURE: 190 MMHG

## 2018-07-26 DIAGNOSIS — R19.01 RIGHT UPPER QUADRANT ABDOMINAL MASS: Primary | ICD-10-CM

## 2018-07-26 DIAGNOSIS — R82.998 CELLS AND CASTS IN URINE: Primary | ICD-10-CM

## 2018-07-26 LAB
BILIRUB UR QL STRIP: NEGATIVE
CLARITY UR: CLEAR
COLOR UR: YELLOW
GLUCOSE UR QL STRIP: NEGATIVE
HGB UR QL STRIP: NEGATIVE
KETONES UR QL STRIP: NEGATIVE
LEUKOCYTE ESTERASE UR QL STRIP: NEGATIVE
NITRITE UR QL STRIP: NEGATIVE
PH UR STRIP: 6 [PH] (ref 5–8)
PROT UR QL STRIP: NEGATIVE
SP GR UR STRIP: 1.01 (ref 1–1.03)
URN SPEC COLLECT METH UR: NORMAL
UROBILINOGEN UR STRIP-ACNC: NEGATIVE EU/DL

## 2018-07-26 PROCEDURE — 87086 URINE CULTURE/COLONY COUNT: CPT

## 2018-07-26 PROCEDURE — 81003 URINALYSIS AUTO W/O SCOPE: CPT

## 2018-07-26 PROCEDURE — 99499 UNLISTED E&M SERVICE: CPT | Mod: S$GLB,,, | Performed by: UROLOGY

## 2018-07-26 PROCEDURE — 99999 PR PBB SHADOW E&M-EST. PATIENT-LVL III: CPT | Mod: PBBFAC,,, | Performed by: SURGERY

## 2018-07-26 PROCEDURE — 99214 OFFICE O/P EST MOD 30 MIN: CPT | Mod: S$GLB,,, | Performed by: SURGERY

## 2018-07-26 RX ORDER — SULFAMETHOXAZOLE AND TRIMETHOPRIM 800; 160 MG/1; MG/1
TABLET ORAL
Refills: 0 | COMMUNITY
Start: 2018-07-18 | End: 2018-08-15 | Stop reason: ALTCHOICE

## 2018-07-26 RX ORDER — GENTAMICIN SULFATE 80 MG/100ML
80 INJECTION, SOLUTION INTRAVENOUS
Status: DISCONTINUED | OUTPATIENT
Start: 2018-07-26 | End: 2018-07-26

## 2018-07-26 NOTE — PROGRESS NOTES
Subjective:       Patient ID: Ramon Reilly is a 77 y.o. male.    Chief Complaint: Biopsy (Liver)    REASON FOR CONSULTATION:  Lesions in the liver and abdominal wall to be biopsied     SIGNIFICANT HISTORY:  The patient had elevated PSA, which prompted a biopsy.    Pathology came back with a [place] 3+3, PSA 11.6, prostate cancer.  The patient   had a urogram that showed some abnormality in the liver and in abdominal wall.    It read as 1.5 x 1.2 x 0.9 cm nodular density along the right lateral margin of   the liver with loss of the normal fat plane.  Other couple of small 6-mm nodular   foci in the fat and also an anterior abdominal wall area 1.4- x 0.8-cm nodular   density and this is being read as consistent with serosal implants in the liver   and any implants to anterior abdominal wall with metastatic disease.  The   patient has no symptoms at all.    The patient did have colonoscopy done for colonic thickening noted on that   urogram CT and this colonoscopy was negative except for a few small adenomatous   He had a lap GB 4 months ago with no apparent abnormalities.    Past Medical History:  No date: BPH (benign prostatic hyperplasia)  No date: Wears glasses  Past Surgical History:  No date: CHOLECYSTECTOMY  6/21/2018: COLONOSCOPY N/A      Comment: Procedure: COLONOSCOPY;  Surgeon: Sally Aggarwal MD;  Location: [facility];  Service:                Endoscopy;  Laterality: N/A;  6/4/2018: CYSTOSCOPY N/A      Comment: Procedure: CYSTOSCOPY;  Surgeon: Christie Bruce MD;  Location: [facility];  Service:                Urology;  Laterality: N/A;  No date: EYE SURGERY Right      Comment: eye lid surgery   1990: HERNIA REPAIR  No date: TONSILLECTOMY, ADENOIDECTOMY  6/4/2018: TRANSRECTAL BIOPSY OF PROSTATE WITH ULTRASOUND* N/A      Comment: Procedure: TRANSRECTAL ULTRASOUND BIOPSY;                 Surgeon: Christie Bruce MD;                 Location: [facility];   Service: Urology;                 Laterality: N/A;  No date: WISDOM TOOTH EXTRACTION      Current Outpatient Prescriptions:   alendronate (FOSAMAX) 70 MG tablet, Take 1 tablet (70 mg total) by mouth every 7 days., Disp: 4 tablet, Rfl: 5  aspirin (ECOTRIN) 81 MG EC tablet, Take 81 mg by mouth once daily., Disp: , Rfl:   calcium carbonate (OS-IDA) 500 mg calcium (1,250 mg) tablet, Take 1 tablet (500 mg total) by mouth once daily., Disp: 30 tablet, Rfl: 12  cholecalciferol, vitamin D3, (VITAMIN D3) 1,000 unit capsule, Take 1,000 Units by mouth once daily., Disp: , Rfl:   tamsulosin HCl (TAMSULOSIN ORAL), , Disp: , Rfl:   alfuzosin (UROXATRAL) 10 mg Tb24, Take 1 tablet (10 mg total) by mouth after dinner., Disp: 90 tablet, Rfl: 3  bicalutamide (CASODEX) 50 MG Tab, Take 1 tablet (50 mg total) by mouth once daily., Disp: 30 tablet, Rfl: 0  sulfamethoxazole-trimethoprim 800-160mg (BACTRIM DS) 800-160 mg Tab, TK 1 T PO BID FOR BIOPSY . START NIGHT B FOR 3 DOSES, Disp: , Rfl: 0  No current facility-administered medications for this visit.     Review of patient's allergies indicates:  No Known Allergies    Review of patient's family history indicates:  Problem: Eczema      Relation: Neg Hx       Age of Onset: (Not Specified)   Problem: Lupus      Relation: Neg Hx       Age of Onset: (Not Specified)   Problem: Psoriasis      Relation: Neg Hx       Age of Onset: (Not Specified)   Problem: Melanoma      Relation: Neg Hx       Age of Onset: (Not Specified)     Social History    Marital status:              Spouse name:                       Years of education:                 Number of children:               Occupational History    None on file    Social History Main Topics    Smoking status: Former Smoker                                                                Packs/day: 0.00      Years: 0.00           Quit date: 3/12/1988    Smokeless tobacco: Never Used                        Alcohol use: Yes                 Comment: social, at weddings    Drug use: No              Sexual activity: No                   Other Topics            Concern    None on file    Social History Narrative    None on file          Review of Systems   Constitutional: Negative for activity change, appetite change, chills, fatigue, fever and unexpected weight change.   HENT: Negative for congestion, dental problem, hearing loss, nosebleeds, sinus pressure, sore throat and voice change.    Eyes: Negative for pain and visual disturbance.   Respiratory: Negative for cough, chest tightness and shortness of breath.    Cardiovascular: Negative for chest pain, palpitations and leg swelling.   Gastrointestinal: Negative for abdominal distention, abdominal pain, constipation, diarrhea, nausea and vomiting.   Endocrine: Negative for cold intolerance and heat intolerance.   Genitourinary: Negative for difficulty urinating, flank pain, frequency and hematuria.   Musculoskeletal: Negative for arthralgias, back pain, gait problem, joint swelling, myalgias, neck pain and neck stiffness.   Skin: Negative for rash.   Allergic/Immunologic: Negative for immunocompromised state.   Neurological: Negative for dizziness, tremors, seizures, syncope, weakness, light-headedness, numbness and headaches.   Hematological: Negative for adenopathy. Does not bruise/bleed easily.   Psychiatric/Behavioral: Negative for confusion, decreased concentration, hallucinations, sleep disturbance and suicidal ideas. The patient is not nervous/anxious.        Objective:      Physical Exam   Constitutional: He is oriented to person, place, and time. He appears well-developed and well-nourished.   HENT:   Head: Normocephalic and atraumatic.   Right Ear: External ear normal.   Left Ear: External ear normal.   Eyes: Conjunctivae are normal. Pupils are equal, round, and reactive to light.   Neck: Normal range of motion.   Cardiovascular: Normal rate and regular rhythm.    Pulmonary/Chest: Effort  normal. No respiratory distress. He exhibits no tenderness.   Abdominal: Soft. He exhibits no distension and no mass. A hernia is present. Hernia confirmed positive in the ventral area (umbilical).   Musculoskeletal: He exhibits no edema or tenderness.   Neurological: He is alert and oriented to person, place, and time. He has normal reflexes. No cranial nerve deficit.   Skin: Skin is warm and dry. No rash noted. No erythema. No pallor.   Psychiatric: He has a normal mood and affect. His behavior is normal. Judgment and thought content normal.   Nursing note and vitals reviewed.      Assessment:       1. Right upper quadrant abdominal mass        Plan:     For laparoscopy and biopsy of mass 7/31/18.  All aspects of procedure including risks and possible complications were discussed in detail with the patient who agrees to proceed with procedure.  All questions were answered and consent was obtained. Preop orders were written.  Patient advised to stop ASA 7 days prior to procedure

## 2018-07-26 NOTE — PATIENT INSTRUCTIONS
.PRE-OP INSTRUCTIONS    Your procedure has been scheduled at:    [facility]pre-admit nurse  [phone]      DAY tuesday    DATE 07/31/18       Please call the pre-op nurse to schedule your pre-op testing and registration  Someone from the hospital will call you the evening before surgery to let you know what time you need to be at the hospital for surgery.                                               1:  DO NOT EAT OR DRINK ANYTHING AFTER MIDNIGHT THE NIGHT BEFORE SURGERY.     2:  You will need to stop any blood thinners 1 week prior to surgery.  This includes Aspirin, fish oil, Pradaxa, Coumadin, Plavix, Pletal.  Please contact the prescribing doctor to be sure it is ok to stop these medicines.    3:  Pre-admit nurse will go over your medicines and let you know which ones not to take the morning of surgery    4:  Plan to have someone drive you home after you are released from the hospital.  You WILL NOT be able to drive yourself.    5:  If you have any questions or need to change your surgery date, please call Irma at [phone]    AFTER SURGERY:    You can shower 48 hours after surgery, REMOVE WET BANDAGES AND BANDAIDS, leave the steri- strips on.  If you have not had a bowel movement within 3 days after surgery you may take a laxative of your choice.  Do not lift anything over 5-10 pounds.    You need to have a follow up appointment 7-14 days after surgery, call the office to schedule or if you have questions or concerns.    For [facility] patients, you will receive a [facility] message with a link to schedule your post op appointment.

## 2018-07-27 RX ORDER — SODIUM CHLORIDE 9 MG/ML
INJECTION, SOLUTION INTRAVENOUS CONTINUOUS
Status: CANCELLED | OUTPATIENT
Start: 2018-07-31

## 2018-07-28 LAB — BACTERIA UR CULT: NORMAL

## 2018-07-30 ENCOUNTER — ANESTHESIA EVENT (OUTPATIENT)
Dept: SURGERY | Facility: HOSPITAL | Age: 78
End: 2018-07-30
Payer: MEDICARE

## 2018-07-30 ENCOUNTER — HOSPITAL ENCOUNTER (OUTPATIENT)
Dept: RADIOLOGY | Facility: HOSPITAL | Age: 78
Discharge: HOME | End: 2018-07-30
Attending: UROLOGY
Payer: MEDICARE

## 2018-07-30 ENCOUNTER — HOSPITAL ENCOUNTER (OUTPATIENT)
Facility: AMBULARY SURGERY CENTER | Age: 78
Discharge: HOME | End: 2018-07-30
Attending: UROLOGY | Admitting: UROLOGY
Payer: MEDICARE

## 2018-07-30 ENCOUNTER — NURSE TRIAGE (OUTPATIENT)
Dept: ADMINISTRATIVE | Facility: CLINIC | Age: 78
End: 2018-07-30

## 2018-07-30 ENCOUNTER — TELEPHONE (OUTPATIENT)
Dept: UROLOGY | Facility: CLINIC | Age: 78
End: 2018-07-30

## 2018-07-30 ENCOUNTER — SURGERY (OUTPATIENT)
Age: 78
End: 2018-07-30

## 2018-07-30 DIAGNOSIS — C61 PROSTATE CANCER: ICD-10-CM

## 2018-07-30 DIAGNOSIS — N40.0 BENIGN PROSTATIC HYPERPLASIA, UNSPECIFIED WHETHER LOWER URINARY TRACT SYMPTOMS PRESENT: ICD-10-CM

## 2018-07-30 LAB
BACTERIA SPEC CULT: NORMAL
BILIRUB SERPL-MCNC: NORMAL MG/DL
BLOOD URINE, POC: NORMAL
CASTS: NORMAL
COLOR, POC UA: NORMAL
CRYSTALS: NORMAL
GLUCOSE UR QL STRIP: NORMAL
KETONES UR QL STRIP: NORMAL
LEUKOCYTE ESTERASE URINE, POC: NORMAL
NITRITE, POC UA: NORMAL
PH, POC UA: 8
PROTEIN, POC: NORMAL
RBC CELLS COUNTED: NORMAL
SPECIFIC GRAVITY, POC UA: 1.01
UROBILINOGEN, POC UA: NORMAL
WHITE BLOOD CELLS: NORMAL

## 2018-07-30 PROCEDURE — 72190 X-RAY EXAM OF PELVIS: CPT | Mod: 26,,, | Performed by: RADIOLOGY

## 2018-07-30 PROCEDURE — 72190 X-RAY EXAM OF PELVIS: CPT | Mod: TC,FY

## 2018-07-30 PROCEDURE — 55876 PLACE RT DEVICE/MARKER PROS: CPT | Performed by: UROLOGY

## 2018-07-30 PROCEDURE — 55876 PLACE RT DEVICE/MARKER PROS: CPT | Mod: ,,, | Performed by: UROLOGY

## 2018-07-30 PROCEDURE — 76872 US TRANSRECTAL: CPT | Performed by: UROLOGY

## 2018-07-30 PROCEDURE — A4648 IMPLANTABLE TISSUE MARKER: HCPCS | Mod: ,,, | Performed by: UROLOGY

## 2018-07-30 PROCEDURE — 76872 US TRANSRECTAL: CPT | Mod: 26,,, | Performed by: UROLOGY

## 2018-07-30 RX ORDER — LIDOCAINE HYDROCHLORIDE 10 MG/ML
INJECTION INFILTRATION; PERINEURAL
Status: DISCONTINUED | OUTPATIENT
Start: 2018-07-30 | End: 2018-07-30 | Stop reason: HOSPADM

## 2018-07-30 RX ORDER — GENTAMICIN SULFATE 40 MG/ML
80 INJECTION, SOLUTION INTRAMUSCULAR; INTRAVENOUS ONCE
Status: COMPLETED | OUTPATIENT
Start: 2018-07-30 | End: 2018-07-30

## 2018-07-30 RX ORDER — LIDOCAINE HYDROCHLORIDE 20 MG/ML
JELLY TOPICAL
Status: DISCONTINUED | OUTPATIENT
Start: 2018-07-30 | End: 2018-07-30 | Stop reason: HOSPADM

## 2018-07-30 RX ADMIN — LIDOCAINE HYDROCHLORIDE 10 ML: 10 INJECTION INFILTRATION; PERINEURAL at 04:07

## 2018-07-30 RX ADMIN — LIDOCAINE HYDROCHLORIDE 5 ML: 20 JELLY TOPICAL at 03:07

## 2018-07-30 RX ADMIN — GENTAMICIN SULFATE 80 MG: 40 INJECTION, SOLUTION INTRAMUSCULAR; INTRAVENOUS at 02:07

## 2018-07-30 NOTE — H&P
"[facility] H&P Note - [place]  Staff: MD aJnis     Referring provider and please cc: Polo and   PCP:  (leaving), changing to Dr.Jackson [facility]: active     Chief Complaint: elevated psa     Subjective:        HPI: Ramon Reilly is a 77 y.o. male presents with      Prostate cancer, pT2b, Gl 3+3 all on right, intermediate risk  -referred by  to urology for elevated psa found on workup for enlarged prostate. Pt was started on flomax. seen by antonio as a referral for elevated psa 9.7 in 2/16/18.  LIZZIE by revealed nodular prostate and referred to  who saw him on 3/28/18 and ordered an MRI. No previous MRIs to compare to here but last one in [place] in 2015 and per pt was "low".  -seen by me for the first time 5/29/18 to discuss MRI. LIZZIE with large 1cm nodule.   -ctu 6/1/18 multiple serosal liver lesions and anterior abdominal wall lesions and 5mm non-obstructing rmp stone  Cysto trus biopsy on 6/4/18 showed 55g prostate and 10/17 cores + for Gl 3+3, all on right     Here for gold markers    7/10/18  trelstar  6/25/18 Started on casodex for 30 days  6/4/18              trus bx: Gl 3+3, 10/17 cores + for Gl 3+3, all on right  5/29/18            11.2  4/25/18            MRI: Pirads 4 posteriormedial to PZ, midgland to apex on the right   1/30/18            9.7     Serosal and abdominal wall implants  -scheduled him with  who did a colonscopy and did not find any colon mass.   -saw  on 7/11/18 felt liver masses likely inflammatory rather than malignant based on pet. not explained by his prostate cancer  - Biopsy of liver lesions planned for 7/31/18 by      bph with luts and nocturia  -started on flomax 0.4mg by  in January for weak stream, nocturia 3-4x a night. Now stream is improved and nocturia 1-2x a night. Wife here with him and denies him snoring. pvr by bladder scan: 63cc  -Been experiencing slowing of stream since " cysto trus biopsy. trus volume was 55g  -flomax increased to 0.8mg  -Uroflow results (date: 07/09/2018) on flomax 0.8mg : Voiding time: 22.8s, Flow time: 20.5s, TTP flow: 3.8s, Peak flowrate: 13.7 mL/s, Average flowrate: 8.0mL/s, Intervals: 4,  Voided volume: 163 mL,  Pvr by bladder scan: 208. Pattern of curve: bell with slightly delayed empty  -Uroflow results (date: 07/12/2018) on flomax 0.8mg nightly   : Voiding time: 32.0s, Flow time: 31.9s, TTP flow: 3.6s, Peak flowrate: 9.4 mL/s, Average flowrate: 4.0mL/s, Intervals: 1,  Voided volume: 128 mL,  Pvr by bladder scan: 76ml. Pattern of curve: bell with delayed drainage  Need to repeat pvr    IIEF: 5     ECOG Status: 0     Gross hematuria  -had chest pain 3/4/18, found to have cholecystitis and was referred for cholecystectomy.  UA at ER was negative that day  but when he went home he saw pink urine for 2 days. No uti symptoms. F/u ua on 3/19/18 negative.  -on no blood thinners except for asa, no h/o stones, former smoker (30 pack year history). No stds in the past. '     -workup has been negative  -ctu 6/1/18 multiple serosal liver lesions and anterior abdominal wall lesions and 5mm non-obstructing rmp stone.   -5/29/18 cytology and culture negative  -cysto 6/4/18 negative       UA today: negative  UCx:  7/26/18 Ng, void: neg  5/29/18            Ng, void: neg, cytology: negative  3/19/18            No cx, void: neg  3/4/18              No cx, void: neg     Vasectomy: no     REVIEW OF SYSTEMS:  General ROS: no fevers, no chills  Psychological ROS: no depression  Endocrine ROS: no heat or cold  Respiratory ROS: no SOB  Cardiovascular ROS: no CP  Gastrointestinal ROS: no abdominal pain, no constipation, no diarrhea, no BRBPR  Musculoskeletal ROS: no muscle pain  Neurological ROS: no headaches  Dermatological ROS: no rashes  HEENT: +glasses, no sinus   ROS: per HPI     PMHx:       Past Medical History:   Diagnosis Date    BPH (benign prostatic hyperplasia)       Wears glasses        Kidney stones: No  Cataracts? Beginning       PSHx:        Past Surgical History:   Procedure Laterality Date    CHOLECYSTECTOMY        COLONOSCOPY N/A 2018     Procedure: COLONOSCOPY;  Surgeon: Sally Aggarwal MD;  Location: [facility];  Service: Endoscopy;  Laterality: N/A;    CYSTOSCOPY N/A 2018     Procedure: CYSTOSCOPY;  Surgeon: Christie Bruce MD;  Location: [facility];  Service: Urology;  Laterality: N/A;    EYE SURGERY Right       eye lid surgery     HERNIA REPAIR       TONSILLECTOMY, ADENOIDECTOMY        TRANSRECTAL BIOPSY OF PROSTATE WITH ULTRASOUND GUIDANCE N/A 2018     Procedure: TRANSRECTAL ULTRASOUND BIOPSY;  Surgeon: Christie Bruce MD;  Location: [facility];  Service: Urology;  Laterality: N/A;    WISDOM TOOTH EXTRACTION       umbilical hernia repair with recurrence     Stents/Valves/Foreign Bodies: Yes   Cardiac Evaluation: No     Screening Studies  Colonoscopy: none      Fam Hx:   malignancies: No  . Gyn malignancies: mother had breast cancer. Mother  a 72 of breast cancer. Father  a 77 of MI  kidney stones: No      Soc Hx:  Former tobacco, quit .  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     Medications: reviewed   Anticoagulation: Yes - asa 81mg daily      Objective:       Vitals:    18 1542   BP: (!) 147/88   Pulse: 70   Resp: 17   Temp:        General:WDWN in NAD  Eyes: PERRLA, normal conjunctiva  Respiratory: No increased work on breathing.   Cardiovascular: No obvious extremity edema. Warm and well perfused.   GI: palpation of masses. No tenderness. No hepatosplenomegaly to palpation.  Musculoskeletal: normal range of motion of bilateral upper extremities. Normal muscle strength and tone.  Skin: no obvious rashes or lesions.  No tightening of skin noted.  Neurologic: CN grossly normal. Normal sensation.   Psychiatric: awake, alert and oriented x 3. Mood and affect normal. Cooperative.      6/25/18  Inspection of anus normal  No scrotal rashes, cysts or lesions  Epididymis normal in size, no tenderness  Testes normal and size, equal size bilaterally, no masses  Urethral meatus normal without discharge  Penis is circumcised,    LIZZIE: 40g gland 1cm prostate nodule right side mid gland towards apex, tenderness. SV not palpable. Normal sphincter tone. No hemhorroids.  No bilateral inguinal hernias noted         LABS REVIEW:        Cr:         Lab Results   Component Value Date     CREATININE 1.0 05/29/2018         PATHOLOGY REVIEW:  trus biopsy 6/4/18:   PROSTATE BIOPSIES 5., 6., 7., 8., 11., 12., AND 13.:  NO CARCINOMA IDENTIFIED  BIOPSIES OF 1. RIGHT LATERAL BASE, 2. RIGHT BASE MEDIAL, 3. RIGHT MID LATERAL, 4. RIGHT MID  MEDIAL, 9. RIGHT APEX MEDIAL, 10. RIGHT APEX LATERAL, 14. RIGHT TRANSITION ZONE, AND 15.  RIGHT APICAL NODULE: ADENOCARCINOMA TOTAL MORRIS SCORE 6 (3+ 3)    Adenocarcinoma involves 30% of specimen 1, 65% of 2, 60% of 3, 60% of 4 (with involvement of both cores), 60% of 9 (involvement of all 3 cores), 80% of 10, 15% of 14, and 75% of 15. The pattern is that of Morris grade 3. The verysmall tissue fragments grossly described are interpreted as being adjacent connective tissue, as opposed to reflectingadditional core biopsies.     Urine cytology 5/31/18: Negative for malignant cells.     RADIOGRAPHIC REVIEW:  ctu 6/1/18 done for gross hematuria  5mm RMP stone. No renal masses, no hydro  subcm likely cysts on both kidneys  Prostate enlarged with diffuse bladder wall thickening  Small nodular serosal implants on the live, anterior abdominal wall suspicious for metastatic dz  Questionable transverse colon mass      MRI of the prostate with and without contrast at [facility]  Date of Service: 4/25/18     Clinical history:  Ramon Reilly is a 77 y.o. male with elevated psa and abnormal LIZZIE      Findings:  Prostate volume: 63mL  Intravesical protrusion: none  Post bx hemhorrage: none  Tumor Localization:               - PZ: pirads 4 posterior to posteromedial PZ of the midgland to apex on the right with epicenter in the midgland. No gross macrocopic EPe noted but does bulge the capsule.              -TZ: pirads 2.      Additional findings:   1. Sv, LN, bones negative.      Impression:   1. findigns c/w pirads 4 1.13mL index lesion.      PIRADS scale:  1 - Very low, clinically significant cancer unlikely to be present  2- Low, clinically significant cancer unlikely to be present  3- Intermediate, the presence of clinically sig cancer is equivocal  4- High, clinically sig cancer likely present  5 - Very High, clinically sig cancer highly likely to be present     Read by: Ba Bryson and Ramon Villalta MD     Please send a copy of the MRI results to the pt           Assessment:       1. Prostate cancer    2. Benign prostatic hyperplasia, unspecified whether lower urinary tract symptoms present    3. Liver masses    4. Gross hematuria           Plan:      Prostate Cancer, Gl3+3 10/17 cores +, psa 11.2 Intermediate Risk, PIRADS 4 MRI (right mid gland PZ), abdominal wall and liver lining implants  -he has 10/17 cores that are positive for Gl 6, which is low grade prostate cancer. However he also has a psa 11.2 which moves him into the intermediate risk category. He is 77 yo and fairly healthy with minimal comorbidities. At this point would recommend treatment which is radiation for him, would not recommend surgery at his age.  -however, we need to still have his abdominal wall and liver implants evaluated. I do not think this is caused by his intermediate risk prostate cancer as his psa is only 11. He has no pelvic lymphadenopathy or bony lesions to suggest metastatic prostate cancer. He has a f/u with Dr.Sumathi Schwartz, oncology, this  Wednesday who will set him up for further testing which may include pet scan and likely a ct guided biopsy by interventional radiology of one of the lesions.   -in the meantime for his prostate cancer, I started him on androgen deprivation therapy. Casodex then trelstar.  -referred to radiation oncology to discuss radiation treatment and he is already scheduled for gold markers on July 30th (8 weeks after biopsy, has wedding to attend). He will take bactrim twice a day starting the night before (3 pills) and 2 fleets enemas the morning of his procedure.        BPH with increasing weakness of stream  -continue flomax  flomax to 2 pills at night to see if this helps with stream  -residual 76cc     Gross hematuria  -workup was negative. Ctu, cytology, cystoscopy and urine culture all  Negative.      F/u on July 30 for gold markers, here today for this  Off asa for last 7days       This patient has been cleared for surgery in ambulatory surgical facility.

## 2018-07-30 NOTE — OP NOTE
Pre-op diagnosis:  Adenocarcinoma of the prostate       - Clinical Stage: T1c       - Biopsy Stage: T2b      - Morris Score: 3+3      - PSA: 11.2    07/30/2018      Post-op diagnosis:  Same    Anesthesia:  Anesthesia Administered:  Lidocaine Jelly to the rectum, then Pudendal nerve block using 10 mL of 1% lidocaine     Pt took fleets and cipro as prescribed    Findings:  Known prostate cancer, referred for fiduciary marker placement     Description of Procedure:  Procedure Details:      - Digital rectal examination performed.        - Ultrasound transducer passed into rectum.        - Prostate visualized with findings as above.        - Local anesthetic (as above) injected into periprostatic submucosa at level of seminal vesical/prostate junction.        - Three gold prostate markers placed at right base (adjacent to urethra), right apex (adjacent to urethra) and left  capsule of the prostate.     Complications:  No immediate complications.     Estimated Blood Loss:   Minimal    Post-op Orders and Disposition:  Discharge:      -  Today to home in a stable condition.   Pelvic xray to eval gold marker placement  Follow up with  as scheduled for IGRT.  Finish given antibiotics as instructed.  Counseled to go to ER if he develops fever  Has biopsy of liver lesion scheduled for tomorrow     Follow-up in 3 to 4 months with psa prior.

## 2018-07-30 NOTE — DISCHARGE SUMMARY
[facility]  Urology  Discharge Note - Short Stay      Patient Name: Ramon Reilly  MRN: [id number]  Discharge Date and Time:  07/30/2018 4:26 PM  Attending Physician: Christie Bruce,*   Discharging Provider: Christie Bruce MD  Primary Care Physician: Antwan Fontana MD (Inactive)    Final Active Diagnoses:    Diagnosis Date Noted POA    Benign prostatic hyperplasia [N40.0] 07/30/2018 Yes      Problems Resolved During this Admission:    Diagnosis Date Noted Date Resolved POA       Final Diagnoses: Same as principal problem.    Hospital Course: Patient was admitted for an outpatient procedure and tolerated the procedure well with no complications.*    Procedure(s) (LRB):  GOLD MARKERS- (N/A)     Indwelling Lines/Drains at time of discharge:   Lines/Drains/Airways     Airway                 Nasopharyngeal Airway -- days                Discharged Condition: good    Disposition: home    Follow Up:      Patient Instructions:     X-Ray Pelvis AP Inlet And Outlet   Standing Status: Future  Standing Exp. Date: 07/30/19   Order Specific Question Answer Comments   Reason for Exam: s/p gold markers    May the Radiologist modify the order per protocol to meet the clinical needs of the patient? Yes      Prostate Specific Antigen, Diagnostic   Standing Status: Future  Standing Exp. Date: 07/30/19         Medications:  Reconciled Home Medications:      Medication List      STOP taking these medications    alendronate 70 MG tablet  Commonly known as:  FOSAMAX     alfuzosin 10 mg Tb24  Commonly known as:  UROXATRAL     bicalutamide 50 MG Tab  Commonly known as:  CASODEX        ASK your doctor about these medications    aspirin 81 MG EC tablet  Commonly known as:  ECOTRIN  Take 81 mg by mouth once daily.     calcium carbonate 500 mg calcium (1,250 mg) tablet  Commonly known as:  OS-IDA  Take 1 tablet (500 mg total) by mouth once daily.     FLEET BISACODYL 10 mg/30 mL Enem  Generic drug:   bisacodyl  Place 10 mg rectally once.     sulfamethoxazole-trimethoprim 800-160mg 800-160 mg Tab  Commonly known as:  BACTRIM DS  TK 1 T PO BID FOR BIOPSY . START NIGHT B FOR 3 DOSES     TAMSULOSIN ORAL     VITAMIN D3 1,000 unit capsule  Generic drug:  cholecalciferol (vitamin D3)  Take 1,000 Units by mouth once daily.            Discharge Procedure Orders (must include Diet, Follow-up, Activity):    Discharge Procedure Orders (must include Diet, Follow-up, Activity)  X-Ray Pelvis AP Inlet And Outlet   Standing Status: Future  Standing Exp. Date: 07/30/19   Order Specific Question Answer Comments   Reason for Exam: s/p gold markers    May the Radiologist modify the order per protocol to meet the clinical needs of the patient? Yes      Prostate Specific Antigen, Diagnostic   Standing Status: Future  Standing Exp. Date: 07/30/19              Christie Bruce MD  Urology  [facility]          \

## 2018-07-30 NOTE — TELEPHONE ENCOUNTER
----- Message from Christie Bruce MD sent at 7/30/2018  4:16 PM CDT -----  kub today after procedure  F/u in 4 months with psa prior     ANABELLA received a call from [place], Angela Quiroz from [facility]   ANABELLA updated and [place] said she will come to the hospital on Thursday at 1100 to meet with Julien

## 2018-07-31 ENCOUNTER — HOSPITAL ENCOUNTER (OUTPATIENT)
Facility: HOSPITAL | Age: 78
Discharge: HOME | End: 2018-07-31
Attending: SURGERY | Admitting: SURGERY
Payer: MEDICARE

## 2018-07-31 ENCOUNTER — ANESTHESIA (OUTPATIENT)
Dept: SURGERY | Facility: HOSPITAL | Age: 78
End: 2018-07-31
Payer: MEDICARE

## 2018-07-31 VITALS
TEMPERATURE: 98 F | HEART RATE: 53 BPM | BODY MASS INDEX: 36.54 KG/M2 | HEIGHT: 71 IN | SYSTOLIC BLOOD PRESSURE: 162 MMHG | DIASTOLIC BLOOD PRESSURE: 72 MMHG | OXYGEN SATURATION: 97 % | RESPIRATION RATE: 18 BRPM | WEIGHT: 261 LBS

## 2018-07-31 DIAGNOSIS — R19.01 RIGHT UPPER QUADRANT ABDOMINAL MASS: Primary | ICD-10-CM

## 2018-07-31 PROCEDURE — 27201423 OPTIME MED/SURG SUP & DEVICES STERILE SUPPLY: Performed by: SURGERY

## 2018-07-31 PROCEDURE — D9220A PRA ANESTHESIA: Mod: ANES,,, | Performed by: ANESTHESIOLOGY

## 2018-07-31 PROCEDURE — 25000003 PHARM REV CODE 250: Performed by: ANESTHESIOLOGY

## 2018-07-31 PROCEDURE — D9220A PRA ANESTHESIA: Mod: CRNA,,, | Performed by: NURSE ANESTHETIST, CERTIFIED REGISTERED

## 2018-07-31 PROCEDURE — 71000015 HC POSTOP RECOV 1ST HR: Performed by: SURGERY

## 2018-07-31 PROCEDURE — 25000003 PHARM REV CODE 250: Performed by: NURSE ANESTHETIST, CERTIFIED REGISTERED

## 2018-07-31 PROCEDURE — 71000033 HC RECOVERY, INTIAL HOUR: Performed by: SURGERY

## 2018-07-31 PROCEDURE — 63600175 PHARM REV CODE 636 W HCPCS: Performed by: SURGERY

## 2018-07-31 PROCEDURE — 88307 TISSUE EXAM BY PATHOLOGIST: CPT | Performed by: PATHOLOGY

## 2018-07-31 PROCEDURE — 37000008 HC ANESTHESIA 1ST 15 MINUTES: Performed by: SURGERY

## 2018-07-31 PROCEDURE — 36000708 HC OR TIME LEV III 1ST 15 MIN: Performed by: SURGERY

## 2018-07-31 PROCEDURE — 37000009 HC ANESTHESIA EA ADD 15 MINS: Performed by: SURGERY

## 2018-07-31 PROCEDURE — 63600175 PHARM REV CODE 636 W HCPCS: Performed by: NURSE ANESTHETIST, CERTIFIED REGISTERED

## 2018-07-31 PROCEDURE — 49321 LAPAROSCOPY BIOPSY: CPT | Mod: ,,, | Performed by: SURGERY

## 2018-07-31 PROCEDURE — 25000003 PHARM REV CODE 250: Performed by: SURGERY

## 2018-07-31 PROCEDURE — 36000709 HC OR TIME LEV III EA ADD 15 MIN: Performed by: SURGERY

## 2018-07-31 PROCEDURE — 99900104 DSU ONLY-NO CHARGE-EA ADD'L HR (STAT): Performed by: SURGERY

## 2018-07-31 PROCEDURE — 71000039 HC RECOVERY, EACH ADD'L HOUR: Performed by: SURGERY

## 2018-07-31 PROCEDURE — 99900103 DSU ONLY-NO CHARGE-INITIAL HR (STAT): Performed by: SURGERY

## 2018-07-31 RX ORDER — BUPIVACAINE HYDROCHLORIDE AND EPINEPHRINE 2.5; 5 MG/ML; UG/ML
INJECTION, SOLUTION EPIDURAL; INFILTRATION; INTRACAUDAL; PERINEURAL
Status: DISCONTINUED | OUTPATIENT
Start: 2018-07-31 | End: 2018-07-31 | Stop reason: HOSPADM

## 2018-07-31 RX ORDER — SUCCINYLCHOLINE CHLORIDE 20 MG/ML
INJECTION INTRAMUSCULAR; INTRAVENOUS
Status: DISCONTINUED | OUTPATIENT
Start: 2018-07-31 | End: 2018-07-31

## 2018-07-31 RX ORDER — DEXAMETHASONE SODIUM PHOSPHATE 4 MG/ML
INJECTION, SOLUTION INTRA-ARTICULAR; INTRALESIONAL; INTRAMUSCULAR; INTRAVENOUS; SOFT TISSUE
Status: DISCONTINUED | OUTPATIENT
Start: 2018-07-31 | End: 2018-07-31

## 2018-07-31 RX ORDER — HYDROMORPHONE HYDROCHLORIDE 1 MG/ML
1 INJECTION, SOLUTION INTRAMUSCULAR; INTRAVENOUS; SUBCUTANEOUS EVERY 4 HOURS PRN
Status: DISCONTINUED | OUTPATIENT
Start: 2018-07-31 | End: 2018-07-31 | Stop reason: HOSPADM

## 2018-07-31 RX ORDER — HYDROCODONE BITARTRATE AND ACETAMINOPHEN 5; 325 MG/1; MG/1
1 TABLET ORAL EVERY 4 HOURS PRN
Qty: 12 TABLET | Refills: 0 | Status: SHIPPED | OUTPATIENT
Start: 2018-07-31 | End: 2018-08-20

## 2018-07-31 RX ORDER — MIDAZOLAM HYDROCHLORIDE 1 MG/ML
INJECTION, SOLUTION INTRAMUSCULAR; INTRAVENOUS
Status: DISCONTINUED | OUTPATIENT
Start: 2018-07-31 | End: 2018-07-31

## 2018-07-31 RX ORDER — SODIUM CHLORIDE, SODIUM LACTATE, POTASSIUM CHLORIDE, CALCIUM CHLORIDE 600; 310; 30; 20 MG/100ML; MG/100ML; MG/100ML; MG/100ML
75 INJECTION, SOLUTION INTRAVENOUS CONTINUOUS
Status: DISCONTINUED | OUTPATIENT
Start: 2018-07-31 | End: 2018-07-31 | Stop reason: HOSPADM

## 2018-07-31 RX ORDER — MEPERIDINE HYDROCHLORIDE 25 MG/ML
12.5 INJECTION INTRAMUSCULAR; INTRAVENOUS; SUBCUTANEOUS ONCE AS NEEDED
Status: DISCONTINUED | OUTPATIENT
Start: 2018-07-31 | End: 2018-07-31 | Stop reason: HOSPADM

## 2018-07-31 RX ORDER — SODIUM CHLORIDE 9 MG/ML
INJECTION, SOLUTION INTRAVENOUS CONTINUOUS
Status: DISCONTINUED | OUTPATIENT
Start: 2018-07-31 | End: 2018-07-31 | Stop reason: HOSPADM

## 2018-07-31 RX ORDER — SODIUM CHLORIDE 0.9 % (FLUSH) 0.9 %
3 SYRINGE (ML) INJECTION
Status: DISCONTINUED | OUTPATIENT
Start: 2018-07-31 | End: 2018-07-31 | Stop reason: HOSPADM

## 2018-07-31 RX ORDER — ROCURONIUM BROMIDE 10 MG/ML
INJECTION, SOLUTION INTRAVENOUS
Status: DISCONTINUED | OUTPATIENT
Start: 2018-07-31 | End: 2018-07-31

## 2018-07-31 RX ORDER — HYDROMORPHONE HYDROCHLORIDE 1 MG/ML
0.2 INJECTION, SOLUTION INTRAMUSCULAR; INTRAVENOUS; SUBCUTANEOUS EVERY 5 MIN PRN
Status: DISCONTINUED | OUTPATIENT
Start: 2018-07-31 | End: 2018-07-31 | Stop reason: HOSPADM

## 2018-07-31 RX ORDER — LIDOCAINE HYDROCHLORIDE 10 MG/ML
5 INJECTION, SOLUTION EPIDURAL; INFILTRATION; INTRACAUDAL; PERINEURAL ONCE
Status: DISCONTINUED | OUTPATIENT
Start: 2018-07-31 | End: 2018-07-31 | Stop reason: HOSPADM

## 2018-07-31 RX ORDER — GLYCOPYRROLATE 0.2 MG/ML
INJECTION INTRAMUSCULAR; INTRAVENOUS
Status: DISCONTINUED | OUTPATIENT
Start: 2018-07-31 | End: 2018-07-31

## 2018-07-31 RX ORDER — HYDROCODONE BITARTRATE AND ACETAMINOPHEN 5; 325 MG/1; MG/1
1 TABLET ORAL EVERY 4 HOURS PRN
Status: DISCONTINUED | OUTPATIENT
Start: 2018-07-31 | End: 2018-07-31 | Stop reason: HOSPADM

## 2018-07-31 RX ORDER — EPHEDRINE SULFATE 50 MG/ML
INJECTION, SOLUTION INTRAVENOUS
Status: DISCONTINUED | OUTPATIENT
Start: 2018-07-31 | End: 2018-07-31

## 2018-07-31 RX ORDER — OXYCODONE HYDROCHLORIDE 5 MG/1
5 TABLET ORAL
Status: DISCONTINUED | OUTPATIENT
Start: 2018-07-31 | End: 2018-07-31 | Stop reason: HOSPADM

## 2018-07-31 RX ORDER — NEOSTIGMINE METHYLSULFATE 1 MG/ML
INJECTION, SOLUTION INTRAVENOUS
Status: DISCONTINUED | OUTPATIENT
Start: 2018-07-31 | End: 2018-07-31

## 2018-07-31 RX ORDER — DIPHENHYDRAMINE HYDROCHLORIDE 50 MG/ML
25 INJECTION INTRAMUSCULAR; INTRAVENOUS EVERY 6 HOURS PRN
Status: DISCONTINUED | OUTPATIENT
Start: 2018-07-31 | End: 2018-07-31 | Stop reason: HOSPADM

## 2018-07-31 RX ORDER — SODIUM CHLORIDE, SODIUM LACTATE, POTASSIUM CHLORIDE, CALCIUM CHLORIDE 600; 310; 30; 20 MG/100ML; MG/100ML; MG/100ML; MG/100ML
INJECTION, SOLUTION INTRAVENOUS CONTINUOUS
Status: DISCONTINUED | OUTPATIENT
Start: 2018-07-31 | End: 2018-07-31 | Stop reason: HOSPADM

## 2018-07-31 RX ORDER — LIDOCAINE HCL/PF 100 MG/5ML
SYRINGE (ML) INTRAVENOUS
Status: DISCONTINUED | OUTPATIENT
Start: 2018-07-31 | End: 2018-07-31

## 2018-07-31 RX ORDER — ONDANSETRON 2 MG/ML
4 INJECTION INTRAMUSCULAR; INTRAVENOUS ONCE
Status: DISCONTINUED | OUTPATIENT
Start: 2018-07-31 | End: 2018-07-31 | Stop reason: HOSPADM

## 2018-07-31 RX ORDER — FENTANYL CITRATE 50 UG/ML
25 INJECTION, SOLUTION INTRAMUSCULAR; INTRAVENOUS EVERY 5 MIN PRN
Status: DISCONTINUED | OUTPATIENT
Start: 2018-07-31 | End: 2018-07-31 | Stop reason: HOSPADM

## 2018-07-31 RX ORDER — FENTANYL CITRATE 50 UG/ML
INJECTION, SOLUTION INTRAMUSCULAR; INTRAVENOUS
Status: DISCONTINUED | OUTPATIENT
Start: 2018-07-31 | End: 2018-07-31

## 2018-07-31 RX ORDER — PROPOFOL 10 MG/ML
VIAL (ML) INTRAVENOUS
Status: DISCONTINUED | OUTPATIENT
Start: 2018-07-31 | End: 2018-07-31

## 2018-07-31 RX ORDER — CEFAZOLIN SODIUM 2 G/50ML
2 SOLUTION INTRAVENOUS
Status: COMPLETED | OUTPATIENT
Start: 2018-07-31 | End: 2018-07-31

## 2018-07-31 RX ORDER — ACETAMINOPHEN 10 MG/ML
INJECTION, SOLUTION INTRAVENOUS
Status: DISCONTINUED | OUTPATIENT
Start: 2018-07-31 | End: 2018-07-31

## 2018-07-31 RX ORDER — ONDANSETRON 2 MG/ML
INJECTION INTRAMUSCULAR; INTRAVENOUS
Status: DISCONTINUED | OUTPATIENT
Start: 2018-07-31 | End: 2018-07-31

## 2018-07-31 RX ADMIN — EPHEDRINE SULFATE 10 MG: 50 INJECTION, SOLUTION INTRAMUSCULAR; INTRAVENOUS; SUBCUTANEOUS at 02:07

## 2018-07-31 RX ADMIN — GLYCOPYRROLATE 0.4 MG: 0.2 INJECTION, SOLUTION INTRAMUSCULAR; INTRAVENOUS at 01:07

## 2018-07-31 RX ADMIN — ROCURONIUM BROMIDE 5 MG: 10 INJECTION, SOLUTION INTRAVENOUS at 01:07

## 2018-07-31 RX ADMIN — PROPOFOL 200 MG: 10 INJECTION, EMULSION INTRAVENOUS at 01:07

## 2018-07-31 RX ADMIN — ONDANSETRON 4 MG: 2 INJECTION, SOLUTION INTRAMUSCULAR; INTRAVENOUS at 02:07

## 2018-07-31 RX ADMIN — DEXAMETHASONE SODIUM PHOSPHATE 8 MG: 4 INJECTION, SOLUTION INTRAMUSCULAR; INTRAVENOUS at 01:07

## 2018-07-31 RX ADMIN — ROCURONIUM BROMIDE 35 MG: 10 INJECTION, SOLUTION INTRAVENOUS at 01:07

## 2018-07-31 RX ADMIN — SUCCINYLCHOLINE CHLORIDE 200 MG: 20 INJECTION, SOLUTION INTRAMUSCULAR; INTRAVENOUS at 01:07

## 2018-07-31 RX ADMIN — SODIUM CHLORIDE, SODIUM LACTATE, POTASSIUM CHLORIDE, AND CALCIUM CHLORIDE: .6; .31; .03; .02 INJECTION, SOLUTION INTRAVENOUS at 02:07

## 2018-07-31 RX ADMIN — GLYCOPYRROLATE 0.4 MG: 0.2 INJECTION, SOLUTION INTRAMUSCULAR; INTRAVENOUS at 02:07

## 2018-07-31 RX ADMIN — OXYCODONE HYDROCHLORIDE 5 MG: 5 TABLET ORAL at 03:07

## 2018-07-31 RX ADMIN — FENTANYL CITRATE 100 MCG: 50 INJECTION, SOLUTION INTRAMUSCULAR; INTRAVENOUS at 01:07

## 2018-07-31 RX ADMIN — EPHEDRINE SULFATE 10 MG: 50 INJECTION, SOLUTION INTRAMUSCULAR; INTRAVENOUS; SUBCUTANEOUS at 01:07

## 2018-07-31 RX ADMIN — EPHEDRINE SULFATE 5 MG: 50 INJECTION, SOLUTION INTRAMUSCULAR; INTRAVENOUS; SUBCUTANEOUS at 01:07

## 2018-07-31 RX ADMIN — NEOSTIGMINE METHYLSULFATE 5 MG: 1 INJECTION INTRAVENOUS at 02:07

## 2018-07-31 RX ADMIN — SODIUM CHLORIDE, SODIUM LACTATE, POTASSIUM CHLORIDE, AND CALCIUM CHLORIDE: .6; .31; .03; .02 INJECTION, SOLUTION INTRAVENOUS at 10:07

## 2018-07-31 RX ADMIN — LIDOCAINE HYDROCHLORIDE 100 MG: 20 INJECTION, SOLUTION INTRAVENOUS at 01:07

## 2018-07-31 RX ADMIN — CEFAZOLIN SODIUM 2 G: 2 SOLUTION INTRAVENOUS at 01:07

## 2018-07-31 RX ADMIN — ACETAMINOPHEN 1000 MG: 10 INJECTION, SOLUTION INTRAVENOUS at 01:07

## 2018-07-31 RX ADMIN — MIDAZOLAM 2 MG: 1 INJECTION INTRAMUSCULAR; INTRAVENOUS at 01:07

## 2018-07-31 NOTE — PLAN OF CARE
Pt aaox4, denies pain and nausea, tolerating clear liquids, vss, dressing cdi, spouse updated, pt released by anesthesia to transfer to phase II, report given to DOLORES Nicole

## 2018-07-31 NOTE — PROGRESS NOTES
Discharged home per wheelchair per personal vehicle with spouse.  aao x 4.  No complaints voiced at discharge.

## 2018-07-31 NOTE — ANESTHESIA PREPROCEDURE EVALUATION
2018  Ramon Reilly is a 77 y.o., male.    Pre-op Assessment    I have reviewed the Patient Summary Reports.     I have reviewed the Nursing Notes.   I have reviewed the Medications.     Review of Systems  Anesthesia Hx:  No problems with previous Anesthesia    Social:  Former Smoker Smoking Status: Former Smoker  Quit Smokin88  Smokeless Tobacco Status: Never Used  Alcohol use: Yes, unspecified volume  Drug use: No           Physical Exam  General:  Morbid Obesity    Airway/Jaw/Neck:  Airway Findings: Mouth Opening: Normal Tongue: Normal  General Airway Assessment: Adult, Possible difficult mask airway, Possible difficult intubation  Mallampati: III  Improves to II with phonation.  TM Distance: 4-6 cm      Dental:  Dental Findings: In tact   Chest/Lungs:  Chest/Lungs Findings: Clear to auscultation, Normal Respiratory Rate     Heart/Vascular:  Heart Findings: Rate: Normal  Rhythm: Regular Rhythm  Sounds: Normal  Heart murmur: negative       Mental Status:  Mental Status Findings:  Cooperative, Alert and Oriented         Anesthesia Plan  Type of Anesthesia, risks & benefits discussed:  Anesthesia Type:  general  Patient's Preference:   Intra-op Monitoring Plan: standard ASA monitors  Intra-op Monitoring Plan Comments:   Post Op Pain Control Plan: multimodal analgesia  Post Op Pain Control Plan Comments:   Induction:   IV  Beta Blocker:  Patient is not currently on a Beta-Blocker (No further documentation required).       Informed Consent: Patient understands risks and agrees with Anesthesia plan.  Questions answered. Anesthesia consent signed with patient.  ASA Score: 3     Day of Surgery Review of History & Physical: I have interviewed and examined the patient. I have reviewed the patient's H&P dated:  There are no significant changes.  H&P update referred to the surgeon.         Ready  For Surgery From Anesthesia Perspective.

## 2018-07-31 NOTE — INTERVAL H&P NOTE
The patient has been examined and the H&P has been reviewed:    I concur with the findings and no changes have occurred since H&P was written.    Anesthesia/Surgery risks, benefits and alternative options discussed and understood by patient/family.          Active Hospital Problems    Diagnosis  POA    Right upper quadrant abdominal mass [R19.01]  Yes      Resolved Hospital Problems    Diagnosis Date Resolved POA   No resolved problems to display.

## 2018-07-31 NOTE — DISCHARGE INSTRUCTIONS
General Information:    1.  Do not drink alcoholic beverages including beer for 24 hours or as long as you are on pain medication..  2.  Do not drive a motor vehicle, operate machinery or power tools, or signs legal papers for 24 hours or as long as you are on pain medication.   3.  You may experience light-headedness, dizziness, and sleepiness following surgery. Please do not stay alone. A responsible adult should be with you for this 24 hour period.  4.  Go home and rest.    5. Progress slowly to a normal diet unless instructed.  Otherwise, begin with liquids such as soft drinks, then soup and crackers working up to solid foods. Drink plenty of nonalcoholic fluids.  6.  Certain anesthetics and pain medications produce nausea and vomiting in certain       individuals. If nausea becomes a problem at home, call you doctor.    7. A nurse will be calling you sometime after surgery. Do not be alarmed. This is our way of finding out how you are doing.     8. Several times every hour while you are awake, take 2-3 deep breaths and cough. If you had stomach surgery hold a pillow or rolled towel firmly against your stomach before you cough. This will help with any pain the cough might cause.  9. Several times every hour while you are awake, pump and flex your feet 5-6 times and do foot circles. This will help prevent blood clots.    10.Call your doctor for severe pain, bleeding, fever, or signs or symptoms of infection (pain, swelling, redness, foul odor, drainage).    Discharge Instructions: After Your Surgery/Procedure  Youve just had surgery. During surgery you were given medicine called anesthesia to keep you relaxed and free of pain. After surgery you may have some pain or nausea. This is common. Here are some tips for feeling better and getting well after surgery.     Stay on schedule with your medication.   Going home  Your doctor or nurse will show you how to take care of yourself when you go home. He or she will  "also answer your questions. Have an adult family member or friend drive you home.      For your safety we recommend these precaution for the first 24 hours after your procedure:  · Do not drive or use heavy equipment.  · Do not make important decisions or sign legal papers.  · Do not drink alcohol.  · Have someone stay with you, if needed. He or she can watch for problems and help keep you safe.  · Your concentration, balance, coordination, and judgement may be impaired for many hours after anesthesia.  Use caution when ambulating or standing up.     · You may feel weak and "washed out" after anesthesia and surgery.      Subtle residual effects of general anesthesia or sedation with regional / local anesthesia can last more than 24 hours.  Rest for the remainder of the day or longer if your Doctor/Surgeon has advised you to do so.  Although you may feel normal within the first 24 hours, your reflexes and mental ability may be impaired without you realizing it.  You may feel dizzy, lightheaded or sleepy for 24 hours or longer.      Be sure to go to all follow-up visits with your doctor. And rest after your surgery for as long as your doctor tells you to.  Coping with pain  If you have pain after surgery, pain medicine will help you feel better. Take it as told, before pain becomes severe. Also, ask your doctor or pharmacist about other ways to control pain. This might be with heat, ice, or relaxation. And follow any other instructions your surgeon or nurse gives you.  Tips for taking pain medicine  To get the best relief possible, remember these points:  · Pain medicines can upset your stomach. Taking them with a little food may help.  · Most pain relievers taken by mouth need at least 20 to 30 minutes to start to work.  · Taking medicine on a schedule can help you remember to take it. Try to time your medicine so that you can take it before starting an activity. This might be before you get dressed, go for a walk, " or sit down for dinner.  · Constipation is a common side effect of pain medicines. Call your doctor before taking any medicines such as laxatives or stool softeners to help ease constipation. Also ask if you should skip any foods. Drinking lots of fluids and eating foods such as fruits and vegetables that are high in fiber can also help. Remember, do not take laxatives unless your surgeon has prescribed them.  · Drinking alcohol and taking pain medicine can cause dizziness and slow your breathing. It can even be deadly. Do not drink alcohol while taking pain medicine.  · Pain medicine can make you react more slowly to things. Do not drive or run machinery while taking pain medicine.  Your health care provider may tell you to take acetaminophen to help ease your pain. Ask him or her how much you are supposed to take each day. Acetaminophen or other pain relievers may interact with your prescription medicines or other over-the-counter (OTC) drugs. Some prescription medicines have acetaminophen and other ingredients. Using both prescription and OTC acetaminophen for pain can cause you to overdose. Read the labels on your OTC medicines with care. This will help you to clearly know the list of ingredients, how much to take, and any warnings. It may also help you not take too much acetaminophen. If you have questions or do not understand the information, ask your pharmacist or health care provider to explain it to you before you take the OTC medicine.  Managing nausea  Some people have an upset stomach after surgery. This is often because of anesthesia, pain, or pain medicine, or the stress of surgery. These tips will help you handle nausea and eat healthy foods as you get better. If you were on a special food plan before surgery, ask your doctor if you should follow it while you get better. These tips may help:  · Do not push yourself to eat. Your body will tell you when to eat and how much.  · Start off with clear  liquids and soup. They are easier to digest.  · Next try semi-solid foods, such as mashed potatoes, applesauce, and gelatin, as you feel ready.  · Slowly move to solid foods. Dont eat fatty, rich, or spicy foods at first.  · Do not force yourself to have 3 large meals a day. Instead eat smaller amounts more often.  · Take pain medicines with a small amount of solid food, such as crackers or toast, to avoid nausea.     Call your surgeon if  · You still have pain an hour after taking medicine. The medicine may not be strong enough.  · You feel too sleepy, dizzy, or groggy. The medicine may be too strong.  · You have side effects like nausea, vomiting, or skin changes, such as rash, itching, or hives.       If you have obstructive sleep apnea  You were given anesthesia medicine during surgery to keep you comfortable and free of pain. After surgery, you may have more apnea spells because of this medicine and other medicines you were given. The spells may last longer than usual.   At home:  · Keep using the continuous positive airway pressure (CPAP) device when you sleep. Unless your health care provider tells you not to, use it when you sleep, day or night. CPAP is a common device used to treat obstructive sleep apnea.  · Talk with your provider before taking any pain medicine, muscle relaxants, or sedatives. Your provider will tell you about the possible dangers of taking these medicines.  © 1713-7591 [employer]. [address] [place] [phone]. All rights reserved. This information is not intended as a substitute for professional medical care. Always follow your healthcare professional's instructions.      Post op instructions for prevention of DVT  What is deep vein thrombosis?  Deep vein thrombosis (DVT) is the medical term for blood clots in the deep veins of the leg.  These blood clots can be dangerous.  A DVT can block a blood vessel and keep blood from getting where it needs to go.   Another problem is that the clot can travel to other parts of the body such as the lungs.  A clot that travels to the lungs is called a pulmonary embolus (PE) and can cause serious problems with breathing which can lead to death.  Am I at risk for DVT/PE?  If you are not very active, you are at risk of DVT.  Anyone confined to bed, sitting for long periods of time, recovering from surgery, etc. increases the risk of DVT.  Other risk factors are cancer diagnosis, certain medications, estrogen replacement in any form,older age, obesity, pregnancy, smoking, history of clotting disorders, and dehydration.  How will I know if I have a DVT?   Swelling in the lower leg   Pain   Warmth, redness, hardness or bulging of the vein  If you have any of these symptoms, call your doctors office right away.  Some people will not have any symptoms until the clot moves to the lungs.  What are the symptoms of a PE?   Panting, shortness of breath, or trouble breathing   Sharp, knife-like chest pain when you breathe   Coughing or coughing up blood   Rapid heartbeat  If you have any of these symptoms or get worse quickly, call 911 for emergency treatment.  How can I prevent a DVT?   Avoid long periods of inactivity and dont cross your legs--get up and walk around every hour or so.   Stay active--walking after surgery is highly encouraged.  This means you should get out of the house and walk in the neighborhood.  Going up and down stairs will not impair healing (unless advised against such activity by your doctor).     Drink plenty of noncaffeinated, nonalcoholic fluids each day to prevent dehydration.   Wear special support stockings, if they have been advised by your doctor.   If you travel, stop at least once an hour and walk around.   Avoid smoking (assistance with stopping is available through your healthcare provider)  Always notify your doctor if you are not able to follow the post operative instructions that are  given to you at the time of discharge.  It may be necessary to prescribe one of the medications available to prevent DVT.Using Opioids for Pain Management     Your doctor has given instructions for you to take an opioid.  This is a drug for bad pain.  It helps control pain without causing bleeding and kidney problems.  Common opioid names are morphine, hydromorphone, oxycodone, and methadone. These drugs are called narcotics.    There are several safety concerns you need to know.     · It is against the law to give or sell this drug to another person.  You must keep this medicine safely locked.    · You may have side effects from taking this medication.  These include nausea, itching, sweating, sleepiness, a change in your ability to breathe, and depression.  · Do not take alcohol or sleeping pills opioids.    · Long-term opoid use may no longer giver you relief from pain.  It can cause you stomach pain, mental anxiety, and headaches.  Long-term opoid use can potentially lead to unlawful street drug abuse and reduce your ability to stay employed.    · Your body may become opioid tolerant if you need to take more to get relief.    · You must stop taking opioids if you begin having more pain as a result of the medicine.    · Opioid withdrawal occurs when you have to stop taking the drug.  It can cause you to have nausea, vomiting, diarrhea, stomach pain, anxiety, and dilated pupils in your eyes. This condition means you are opioid dependent.    · Addiction is a drug induced brain disease. It means there are changes in how your brain is working.  Children, teens, and young adults under 25 years old are more likely to get addicted to opioids.      · Addiction can happen with repeated opioid use.  It does not happen with short-term use of two weeks or less.       For more information, please speak with your doctor or pharmacist.         Discharge Instructions: Caring for Your Abdominal Incision  You are going home with  stitches (sutures), surgical staples, special strips of tape, or surgical skin glue. One of these items was used to close your incision, help stop bleeding, and speed healing. Follow the tips on this sheet to help your incision heal.   Home care  Care for specific closures  Follow these guidelines unless your healthcare provider tells you otherwise:  · Sutures or staples. Once you no longer need to keep these dry, clean the wound daily, using the instructions listed above. First remove the bandage using clean hands. Then wash the area gently with soap and warm water. Use a wet cotton swab to loosen and remove any blood or crust that forms. After cleaning, put a thin layer of antibiotic ointment on. Then put on a new bandage.  · Skin glue. Dont put liquid, ointment, or cream on your wound while the glue is in place. Avoid activities that cause heavy sweating. Protect the wound from sunlight. Do not scratch, rub, or pick at the glue. Do not put tape directly over the glue. The glue should peel off within 5 to 10 days.  · Surgical tape. Keep the area dry. If it gets wet, blot the area dry with a clean towel. Surgical tape usually falls off within 7 to 10 days. If it has not fallen off after 10 days, contact your healthcare provider before taking it off yourself. If you are told to remove the tape, put mineral oil or petroleum jelly on a cotton ball. Gently rub the tape until it is removed.  Follow-up care  Follow up with your healthcare provider to ask how long sutures or staples should be left in place. Be sure to return for suture or staple removal as directed.  If tape closures were used, remove them yourself when your provider recommends if they have not fallen off on their own. If skin glue was used, the glue will wear off by itself.     When to call your healthcare provider  Call your healthcare provider right away if you have any of the following:  · More pain, bleeding, redness, swelling, or foul-smelling  discharge around the incision area  · Fever of 100.4°F (38°C) or higher, or as directed by your healthcare provider  · Shaking chills  · Vomiting or nausea that doesnt go away  · Numbness, coldness, or tingling around the incision area, or changes in skin color  · Opening of sutures or wound  · Stitches or staples come apart or fall out or surgical tape falls off before 7 days, or as directed by your provider   Date Last Reviewed: 8/1/2016 © 2000-2017 "[employer]. [address] [place] [address]. All rights reserved. This information is not intended as a substitute for professional medical care. Always follow your healthcare professional's instructions.       We hope your stay was comfortable as you heal now, mend and rest.    For we have enjoyed taking care of you by giving your our best.    And as you get better, by regaining your health and strength;   We count it as a privilege to have served you and hope your time at [facility] was well spent.      Thank  You!!!

## 2018-07-31 NOTE — BRIEF OP NOTE
[facility]  Brief Operative Note    SUMMARY     Surgery Date: 7/31/2018     Surgeon(s) and Role:     * Corky Paiz MD - Primary    Assisting Surgeon: None    Pre-op Diagnosis:  Right upper quadrant abdominal mass [R19.01]    Post-op Diagnosis:  Post-Op Diagnosis Codes:     * Right upper quadrant abdominal mass [R19.01]    Procedure(s) (LRB):  LAPAROSCOPY, DIAGNOSTIC (N/A)  BIOPSY, ABDOMINAL WALL (N/A)    Anesthesia: General    Description of the findings of the procedure: Scar like nodules between liver and peritoneum.  Biopsy x 3    Estimated Blood Loss: *10 cc*         Specimens:   Specimen (12h ago through future)    Start     Ordered    07/31/18 1417  Specimen to Pathology - Surgery  Once     Comments:  Pre-op Diagnosis: Right upper quadrant abdominal mass [R19.01]Post-op Diagnosis: SAME Procedure(s):LAPAROSCOPY, DIAGNOSTICBIOPSY, ABDOMINAL WALL Number of specimens: 1Name of specimens: 1. PERITONEAL MASSES      07/31/18 6756

## 2018-07-31 NOTE — DISCHARGE SUMMARY
Discharge Summary  General Surgery      Admit Date: 7/31/2018    Discharge Date :7/31/2018    Attending Physician: Corky Paiz     Discharge Physician: Corky Paiz    Discharged Condition: good    Discharge Diagnosis: Right upper quadrant abdominal mass [R19.01]    Treatments/Procedures: Procedure(s) (LRB):  LAPAROSCOPY, DIAGNOSTIC (N/A)  BIOPSY, ABDOMINAL WALL (N/A)    Hospital Course: Uneventful; Discharged home from [facility]    Significant Diagnostic Studies: none    Disposition: Home or Self Care    Diet: Regular    Follow up: Office 10-14 days    Activity: No heavy lifting till seen in office.    Patient Instructions:   Current Discharge Medication List      START taking these medications    Details   HYDROcodone-acetaminophen (NORCO) 5-325 mg per tablet Take 1 tablet by mouth every 4 (four) hours as needed for Pain.  Qty: 12 tablet, Refills: 0         CONTINUE these medications which have NOT CHANGED    Details   bisacodyl (FLEET BISACODYL) 10 mg/30 mL [address] 10 mg rectally once.      calcium carbonate (OS-IDA) 500 mg calcium (1,250 mg) tablet Take 1 tablet (500 mg total) by mouth once daily.  Qty: 30 tablet, Refills: 12      cholecalciferol, vitamin D3, (VITAMIN D3) 1,000 unit capsule Take 1,000 Units by mouth once daily.      sulfamethoxazole-trimethoprim 800-160mg (BACTRIM DS) 800-160 mg Tab TK 1 T PO BID FOR BIOPSY . START NIGHT B FOR 3 DOSES  Refills: 0      tamsulosin HCl (TAMSULOSIN ORAL)       aspirin (ECOTRIN) 81 MG EC tablet Take 81 mg by mouth once daily.               Discharge Procedure Orders  Diet general     Lifting restrictions     Call MD for:  temperature >100.4     Call MD for:  persistent nausea and vomiting     Call MD for:  severe uncontrolled pain     Call MD for:  difficulty breathing, headache or visual disturbances     Call MD for:  redness, tenderness, or signs of infection (pain, swelling, redness, odor or green/yellow discharge around incision site)     Call MD for:  hives      Call MD for:  persistent dizziness or light-headedness     Call MD for:  extreme fatigue     Remove dressing in 48 hours     Shower on day dressing removed (No bath)

## 2018-07-31 NOTE — H&P (VIEW-ONLY)
Subjective:       Patient ID: Ramon eRilly is a 77 y.o. male.    Chief Complaint: Biopsy (Liver)    REASON FOR CONSULTATION:  Lesions in the liver and abdominal wall to be biopsied     SIGNIFICANT HISTORY:  The patient had elevated PSA, which prompted a biopsy.    Pathology came back with a [place] 3+3, PSA 11.6, prostate cancer.  The patient   had a urogram that showed some abnormality in the liver and in abdominal wall.    It read as 1.5 x 1.2 x 0.9 cm nodular density along the right lateral margin of   the liver with loss of the normal fat plane.  Other couple of small 6-mm nodular   foci in the fat and also an anterior abdominal wall area 1.4- x 0.8-cm nodular   density and this is being read as consistent with serosal implants in the liver   and any implants to anterior abdominal wall with metastatic disease.  The   patient has no symptoms at all.    The patient did have colonoscopy done for colonic thickening noted on that   urogram CT and this colonoscopy was negative except for a few small adenomatous   He had a lap GB 4 months ago with no apparent abnormalities.    Past Medical History:  No date: BPH (benign prostatic hyperplasia)  No date: Wears glasses  Past Surgical History:  No date: CHOLECYSTECTOMY  6/21/2018: COLONOSCOPY N/A      Comment: Procedure: COLONOSCOPY;  Surgeon: Sally Aggarwal MD;  [facility];  Service:                Endoscopy;  Laterality: N/A;  6/4/2018: CYSTOSCOPY N/A      Comment: Procedure: CYSTOSCOPY;  Surgeon: Christie Bruce MD;  Location: [facility];  Service:                Urology;  Laterality: N/A;  No date: EYE SURGERY Right      Comment: eye lid surgery   1990: HERNIA REPAIR  No date: TONSILLECTOMY, ADENOIDECTOMY  6/4/2018: TRANSRECTAL BIOPSY OF PROSTATE WITH ULTRASOUND* N/A      Comment: Procedure: TRANSRECTAL ULTRASOUND BIOPSY;                 Surgeon: Christie Bruce MD;                 Location: [facility];   Service: Urology;                 Laterality: N/A;  No date: WISDOM TOOTH EXTRACTION      Current Outpatient Prescriptions:   alendronate (FOSAMAX) 70 MG tablet, Take 1 tablet (70 mg total) by mouth every 7 days., Disp: 4 tablet, Rfl: 5  aspirin (ECOTRIN) 81 MG EC tablet, Take 81 mg by mouth once daily., Disp: , Rfl:   calcium carbonate (OS-IDA) 500 mg calcium (1,250 mg) tablet, Take 1 tablet (500 mg total) by mouth once daily., Disp: 30 tablet, Rfl: 12  cholecalciferol, vitamin D3, (VITAMIN D3) 1,000 unit capsule, Take 1,000 Units by mouth once daily., Disp: , Rfl:   tamsulosin HCl (TAMSULOSIN ORAL), , Disp: , Rfl:   alfuzosin (UROXATRAL) 10 mg Tb24, Take 1 tablet (10 mg total) by mouth after dinner., Disp: 90 tablet, Rfl: 3  bicalutamide (CASODEX) 50 MG Tab, Take 1 tablet (50 mg total) by mouth once daily., Disp: 30 tablet, Rfl: 0  sulfamethoxazole-trimethoprim 800-160mg (BACTRIM DS) 800-160 mg Tab, TK 1 T PO BID FOR BIOPSY . START NIGHT B FOR 3 DOSES, Disp: , Rfl: 0  No current facility-administered medications for this visit.     Review of patient's allergies indicates:  No Known Allergies    Review of patient's family history indicates:  Problem: Eczema      Relation: Neg Hx       Age of Onset: (Not Specified)   Problem: Lupus      Relation: Neg Hx       Age of Onset: (Not Specified)   Problem: Psoriasis      Relation: Neg Hx       Age of Onset: (Not Specified)   Problem: Melanoma      Relation: Neg Hx       Age of Onset: (Not Specified)     Social History    Marital status:              Spouse name:                       Years of education:                 Number of children:               Occupational History    None on file    Social History Main Topics    Smoking status: Former Smoker                                                                Packs/day: 0.00      Years: 0.00           Quit date: 3/12/1988    Smokeless tobacco: Never Used                        Alcohol use: Yes                 Comment: social, at weddings    Drug use: No              Sexual activity: No                   Other Topics            Concern    None on file    Social History Narrative    None on file          Review of Systems   Constitutional: Negative for activity change, appetite change, chills, fatigue, fever and unexpected weight change.   HENT: Negative for congestion, dental problem, hearing loss, nosebleeds, sinus pressure, sore throat and voice change.    Eyes: Negative for pain and visual disturbance.   Respiratory: Negative for cough, chest tightness and shortness of breath.    Cardiovascular: Negative for chest pain, palpitations and leg swelling.   Gastrointestinal: Negative for abdominal distention, abdominal pain, constipation, diarrhea, nausea and vomiting.   Endocrine: Negative for cold intolerance and heat intolerance.   Genitourinary: Negative for difficulty urinating, flank pain, frequency and hematuria.   Musculoskeletal: Negative for arthralgias, back pain, gait problem, joint swelling, myalgias, neck pain and neck stiffness.   Skin: Negative for rash.   Allergic/Immunologic: Negative for immunocompromised state.   Neurological: Negative for dizziness, tremors, seizures, syncope, weakness, light-headedness, numbness and headaches.   Hematological: Negative for adenopathy. Does not bruise/bleed easily.   Psychiatric/Behavioral: Negative for confusion, decreased concentration, hallucinations, sleep disturbance and suicidal ideas. The patient is not nervous/anxious.        Objective:      Physical Exam   Constitutional: He is oriented to person, place, and time. He appears well-developed and well-nourished.   HENT:   Head: Normocephalic and atraumatic.   Right Ear: External ear normal.   Left Ear: External ear normal.   Eyes: Conjunctivae are normal. Pupils are equal, round, and reactive to light.   Neck: Normal range of motion.   Cardiovascular: Normal rate and regular rhythm.    Pulmonary/Chest: Effort  normal. No respiratory distress. He exhibits no tenderness.   Abdominal: Soft. He exhibits no distension and no mass. A hernia is present. Hernia confirmed positive in the ventral area (umbilical).   Musculoskeletal: He exhibits no edema or tenderness.   Neurological: He is alert and oriented to person, place, and time. He has normal reflexes. No cranial nerve deficit.   Skin: Skin is warm and dry. No rash noted. No erythema. No pallor.   Psychiatric: He has a normal mood and affect. His behavior is normal. Judgment and thought content normal.   Nursing note and vitals reviewed.      Assessment:       1. Right upper quadrant abdominal mass        Plan:     For laparoscopy and biopsy of mass 7/31/18.  All aspects of procedure including risks and possible complications were discussed in detail with the patient who agrees to proceed with procedure.  All questions were answered and consent was obtained. Preop orders were written.  Patient advised to stop ASA 7 days prior to procedure

## 2018-07-31 NOTE — ANESTHESIA POSTPROCEDURE EVALUATION
"Anesthesia Post Evaluation    Patient: Ramon Reilly    Procedure(s) Performed: Procedure(s) (LRB):  LAPAROSCOPY, DIAGNOSTIC (N/A)  BIOPSY, ABDOMINAL WALL (N/A)    Final Anesthesia Type: general  Patient location during evaluation: PACU  Patient participation: Yes- Able to Participate  Level of consciousness: awake and alert and oriented  Post-procedure vital signs: reviewed and stable  Pain management: adequate  Airway patency: patent  PONV status at discharge: No PONV  Anesthetic complications: no      Cardiovascular status: blood pressure returned to baseline and stable  Respiratory status: unassisted and spontaneous ventilation  Hydration status: euvolemic  Follow-up not needed.        Visit Vitals  /63   Pulse 60   Temp 36.6 °C (97.9 °F) (Skin)   Resp 12   Ht 5' 10.5" (1.791 m)   Wt 118.4 kg (261 lb)   SpO2 (!) 94%   BMI 36.92 kg/m²       Pain/Triston Score: Pain Assessment Performed: Yes (7/31/2018  2:52 PM)  Presence of Pain: other (see comments) (pt sedated, vss, no indication of pain) (7/31/2018  2:52 PM)  Pain Rating Prior to Med Admin: 3 (7/31/2018  3:19 PM)  Triston Score: 5 (7/31/2018  3:05 PM)  Modified Triston Score: 20 (7/30/2018  4:30 PM)      "

## 2018-07-31 NOTE — TELEPHONE ENCOUNTER
Reason for Disposition   General information question, no triage required and triager able to answer question    Protocols used: ST INFORMATION ONLY CALL-A-AH    Ramon called wanting to know if any prep was required for his liver biopsy tomorrow morning.  Explained that I do not see any order for prep, and then he told me Dr Paiz told him no prep was required.  Encouraged him to follow the instructions for prep given to him by Dr Paiz.  States he will.  Message to Dr Paiz.  Please contact caller directly with any additional care advice.

## 2018-08-01 VITALS
SYSTOLIC BLOOD PRESSURE: 166 MMHG | WEIGHT: 261 LBS | HEIGHT: 71 IN | TEMPERATURE: 98 F | HEART RATE: 76 BPM | BODY MASS INDEX: 36.54 KG/M2 | DIASTOLIC BLOOD PRESSURE: 97 MMHG | OXYGEN SATURATION: 99 % | RESPIRATION RATE: 18 BRPM

## 2018-08-06 ENCOUNTER — TELEPHONE (OUTPATIENT)
Dept: HEMATOLOGY/ONCOLOGY | Facility: CLINIC | Age: 78
End: 2018-08-06

## 2018-08-07 ENCOUNTER — OFFICE VISIT (OUTPATIENT)
Dept: RADIATION ONCOLOGY | Facility: CLINIC | Age: 78
End: 2018-08-07
Payer: MEDICARE

## 2018-08-07 DIAGNOSIS — C61 ADENOCARCINOMA OF PROSTATE: ICD-10-CM

## 2018-08-07 DIAGNOSIS — C61 MALIGNANT NEOPLASM OF PROSTATE: Primary | ICD-10-CM

## 2018-08-07 LAB — CREATININE: 1.11 MG/DL (ref 0.6–1.4)

## 2018-08-07 PROCEDURE — 99213 OFFICE O/P EST LOW 20 MIN: CPT | Mod: ,,, | Performed by: RADIOLOGY

## 2018-08-14 ENCOUNTER — TELEPHONE (OUTPATIENT)
Dept: HEMATOLOGY/ONCOLOGY | Facility: CLINIC | Age: 78
End: 2018-08-14

## 2018-08-15 ENCOUNTER — OFFICE VISIT (OUTPATIENT)
Dept: SURGERY | Facility: CLINIC | Age: 78
End: 2018-08-15
Payer: MEDICARE

## 2018-08-15 VITALS — HEIGHT: 71 IN | WEIGHT: 260.56 LBS | BODY MASS INDEX: 36.48 KG/M2 | TEMPERATURE: 99 F

## 2018-08-15 DIAGNOSIS — Z98.890 POST-OPERATIVE STATE: Primary | ICD-10-CM

## 2018-08-15 PROCEDURE — 99024 POSTOP FOLLOW-UP VISIT: CPT | Mod: S$GLB,,, | Performed by: SURGERY

## 2018-08-15 PROCEDURE — 99999 PR PBB SHADOW E&M-EST. PATIENT-LVL III: CPT | Mod: PBBFAC,,, | Performed by: SURGERY

## 2018-08-15 NOTE — PROGRESS NOTES
S/P laparoscopy and peritoneal biopsy (benign).  Doing well.  No complaints.  Incision clean.  Instructed and discharged.

## 2018-08-20 ENCOUNTER — OFFICE VISIT (OUTPATIENT)
Dept: HEMATOLOGY/ONCOLOGY | Facility: CLINIC | Age: 78
End: 2018-08-20
Payer: MEDICARE

## 2018-08-20 VITALS
DIASTOLIC BLOOD PRESSURE: 72 MMHG | TEMPERATURE: 98 F | WEIGHT: 258.19 LBS | HEIGHT: 71 IN | BODY MASS INDEX: 36.15 KG/M2 | RESPIRATION RATE: 18 BRPM | HEART RATE: 69 BPM | SYSTOLIC BLOOD PRESSURE: 144 MMHG

## 2018-08-20 DIAGNOSIS — D49.89 NEOPLASM OF ABDOMEN: ICD-10-CM

## 2018-08-20 DIAGNOSIS — E78.00 PURE HYPERCHOLESTEROLEMIA: ICD-10-CM

## 2018-08-20 DIAGNOSIS — C61 ADENOCARCINOMA OF PROSTATE: Primary | ICD-10-CM

## 2018-08-20 PROCEDURE — 99999 PR PBB SHADOW E&M-EST. PATIENT-LVL III: CPT | Mod: PBBFAC,,, | Performed by: INTERNAL MEDICINE

## 2018-08-20 PROCEDURE — 99214 OFFICE O/P EST MOD 30 MIN: CPT | Mod: S$GLB,,, | Performed by: INTERNAL MEDICINE

## 2018-08-20 NOTE — PROGRESS NOTES
A 77-year-old  male sent in consultation by Dr. Bruce.      Lesions in the liver and abdominal wall. bx recently here for results    SIGNIFICANT HISTORY:  The patient had elevated PSA, which prompted a biopsy.    Pathology came back with a [place] 3+3, PSA 11.6, prostate cancer.  The patient   had a urogram that showed some abnormality in the liver and in abdominal wall.    It read as 1.5 x 1.2 x 0.9 cm nodular density along the right lateral margin of   the liver with loss of the normal fat plane.  Other couple of small 6-mm nodular   foci in the fat and also an anterior abdominal wall area 1.4- x 0.8-cm nodular   density and this is being read as consistent with serosal implants in the liver   and any implants to anterior abdominal wall with metastatic disease.  The   patient has no symptoms at all.  He is preparing for his granddaughter's   wedding.  He does have dyslipidemia and his newly diagnosed prostate cancer.    The patient did have colonoscopy done for colonic thickening noted on that   urogram CT and this colonoscopy was negative except for a few small adenomatous   Polyps.we did a PET  Prior to bx  ALLERGIES:  The patient has no known drug allergies.    MEDICATIONS:  Include Fosamax, Ecotrin, Casodex that was recently started,   Os-Leon, vitamin D3, and Flomax.    SOCIAL HISTORY:  Former smoker.  Alcohol social.  No recreational drug use.  He   is here with his wife today.    FAMILY HISTORY:  Noncontributory.    PREVIOUS SURGICAL HISTORY:  Significant for cystoscopies, transrectal biopsy,   hernia repair, tonsillectomy, adenoidectomy.    PHYSICAL EXAMINATION:  GENERAL:  Obese gentleman.  Comfortable looking patient.  Patient is in no   distress.  Awake, alert and oriented to time, person and place.  No anxiety, or   agitation.      HEENT:  Normal conjunctivae and eyelids.  WNL.  PERRLA 3 to 4 mm.  No icterus,   no pallor, no congestion, and no discharge noted.     NECK:   Supple.  Trachea  is central.  No crepitus.  No JVD or masses.    RESPIRATORY:  No intercostal retractions.  No dullness to percussion.  Chest is   clear to auscultation.  No rales, rhonchi or wheezes.  No crepitus.  Good air   entry bilaterally.    CARDIOVASCULAR:  S1 and S2 are normally heard without murmurs or gallops.  All   peripheral pulses are present.    ABDOMEN:  Normal abdomen.  No hepatosplenomegaly.  No free fluid.  Bowel sounds   are present.  No hernia noted. No masses.  No rebound or tenderness.  No   guarding or rigidity.  Umbilicus is midline.    LYMPHATICS:  No axillary, cervical, supraclavicular, submental, or inguinal   lymphadenopathy.    SKIN AND MUSCULOSKELETAL:  There is no evidence of excoriation marks or   ecchymosis.  No rashes.  No cyanosis.  No clubbing.  No joint or skeletal   deformities noted.  Normal range of motion.    NEUROLOGIC:  Higher functions are appropriate.  No cranial nerve deficits.    Normal gait.  Normal strength.  Motor and sensory functions are normal.  Deep   tendon reflexes are normal.    GENITAL AND RECTAL:  Exams are deferred.       Lab Results   Component Value Date    WBC 7.90 07/09/2018    HGB 14.3 07/09/2018    HCT 42.4 07/09/2018    MCV 87 07/09/2018     07/09/2018     CMP  Sodium   Date Value Ref Range Status   07/09/2018 136 136 - 145 mmol/L Final     Potassium   Date Value Ref Range Status   07/09/2018 4.8 3.5 - 5.1 mmol/L Final     Chloride   Date Value Ref Range Status   07/09/2018 103 95 - 110 mmol/L Final     CO2   Date Value Ref Range Status   07/09/2018 27 23 - 29 mmol/L Final     Glucose   Date Value Ref Range Status   07/09/2018 101 70 - 110 mg/dL Final     BUN, Bld   Date Value Ref Range Status   07/09/2018 18 8 - 23 mg/dL Final     Creatinine   Date Value Ref Range Status   08/07/2018 1.11 0.60 - 1.40 mg/dL      Calcium   Date Value Ref Range Status   07/09/2018 9.7 8.7 - 10.5 mg/dL Final     Total Protein   Date Value Ref Range Status   07/09/2018 7.5 6.0 -  8.4 g/dL Final     Albumin   Date Value Ref Range Status   07/09/2018 4.0 3.5 - 5.2 g/dL Final     Total Bilirubin   Date Value Ref Range Status   07/09/2018 0.7 0.1 - 1.0 mg/dL Final     Comment:     For infants and newborns, interpretation of results should be based  on gestational age, weight and in agreement with clinical  observations.  Premature Infant recommended reference ranges:  Up to 24 hours.............<8.0 mg/dL  Up to 48 hours............<12.0 mg/dL  3-5 days..................<15.0 mg/dL  6-29 days.................<15.0 mg/dL       Alkaline Phosphatase   Date Value Ref Range Status   07/09/2018 68 55 - 135 U/L Final     AST   Date Value Ref Range Status   07/09/2018 14 10 - 40 U/L Final     ALT   Date Value Ref Range Status   07/09/2018 15 10 - 44 U/L Final     Anion Gap   Date Value Ref Range Status   07/09/2018 6 (L) 8 - 16 mmol/L Final     eGFR if    Date Value Ref Range Status   07/09/2018 >60 >60 mL/min/1.73 m^2 Final     eGFR if non    Date Value Ref Range Status   07/09/2018 >60 >60 mL/min/1.73 m^2 Final     Comment:     Calculation used to obtain the estimated glomerular filtration  rate (eGFR) is the CKD-EPI equation.      7/31/2018 laparascopic bx  FINAL PATHOLOGIC DIAGNOSIS  PERITONEAL MASS, RIGHT UPPER QUADRANT, BIOPSIES:  -Focal benign liver parenchyma with acute and chronic inflammation, surrounding granulation tissue with marked  acute and chronic inflammation and associated fibrosis (see comment).  -No evidence of malignancy.      IMPRESSION: PET 6/2018    1. Perihepatic soft tissue nodules are hypermetabolic and consistent with  metastatic disease. These are not significantly changed in size compared to the  reference exam.  2. Subcutaneous soft tissue abnormality involving the anterior abdominal wall  demonstrates low level FDG activity is felt to reflect an inflammatory process  rather than metastatic disease, although the latter cannot be entirely  excluded  and attention on follow-up is recommended.  3. 2 - 3 mm right lung pulmonary nodules are too small for PET characterization.  4. Incidental findings as above.    Read and electronically signed by: Trae Schwartz MD on 7/6/2018 2:24       IMPRESSION:   1.  [place] 3+3 prostate cancer  Starting xrt and continues follow up with DR. Nevarez   2.  PET showing perihepatic masses.  bx is negative ? Scar tissue . wioll clinically follow and rpt scan PET in 3 months    pt to return to my clinic sooner if symptomatic

## 2018-10-10 ENCOUNTER — TELEPHONE (OUTPATIENT)
Dept: UROLOGY | Facility: CLINIC | Age: 78
End: 2018-10-10

## 2018-10-10 RX ORDER — TAMSULOSIN HYDROCHLORIDE 0.4 MG/1
0.4 CAPSULE ORAL
Qty: 90 CAPSULE | Refills: 3 | Status: SHIPPED | OUTPATIENT
Start: 2018-10-10 | End: 2018-12-04 | Stop reason: ALTCHOICE

## 2018-10-10 NOTE — TELEPHONE ENCOUNTER
----- Message from Lenin Arroyo sent at 10/10/2018  2:05 PM CDT -----  Contact: vipin Navarro, [phone]. Requesting refill for tamsulosin HCl (TAMSULOSIN ORAL). Requesting a return call to discuss refill. Please advise. Thanks.    [facility]  [address] [address]  Phone: [phone] Fax: [phone]

## 2018-10-12 ENCOUNTER — TELEPHONE (OUTPATIENT)
Dept: UROLOGY | Facility: CLINIC | Age: 78
End: 2018-10-12

## 2018-10-12 NOTE — TELEPHONE ENCOUNTER
Spoke with patient's wife she states patient is currently at a doctors appointment. Informed her prescription was sent to the pharmacy on 10/10. Verbally voiced understanding.

## 2018-10-12 NOTE — TELEPHONE ENCOUNTER
----- Message from Rosanna Alegre sent at 10/12/2018  8:18 AM CDT -----  Type:  Pharmacy Calling to Clarify an RX    Name of Caller:  pt  Pharmacy Name:    Prescription Name:   [id number] - [place] - [address] [facility]  [address]  [place] [address]  Phone: [phone] Fax: [phone]      Best Call Back Number: [phone]  Additional Information:  Last  Dose  Of med today

## 2018-10-18 DIAGNOSIS — C61 MALIGNANT NEOPLASM OF PROSTATE: Primary | ICD-10-CM

## 2018-10-19 LAB — PSA, DIAGNOSTIC: 0.2 NG/ML (ref 0–3)

## 2018-10-29 ENCOUNTER — LAB VISIT (OUTPATIENT)
Dept: LAB | Facility: HOSPITAL | Age: 78
End: 2018-10-29
Attending: INTERNAL MEDICINE
Payer: MEDICARE

## 2018-10-29 DIAGNOSIS — D49.89 NEOPLASM OF ABDOMEN: ICD-10-CM

## 2018-10-29 LAB
ALBUMIN SERPL BCP-MCNC: 3.9 G/DL
ALP SERPL-CCNC: 52 U/L
ALT SERPL W/O P-5'-P-CCNC: 17 U/L
ANION GAP SERPL CALC-SCNC: 7 MMOL/L
AST SERPL-CCNC: 14 U/L
BASOPHILS # BLD AUTO: 0 K/UL
BASOPHILS NFR BLD: 0.4 %
BILIRUB SERPL-MCNC: 0.5 MG/DL
BUN SERPL-MCNC: 19 MG/DL
CALCIUM SERPL-MCNC: 9.6 MG/DL
CHLORIDE SERPL-SCNC: 105 MMOL/L
CO2 SERPL-SCNC: 29 MMOL/L
CREAT SERPL-MCNC: 0.9 MG/DL
DIFFERENTIAL METHOD: ABNORMAL
EOSINOPHIL # BLD AUTO: 0.1 K/UL
EOSINOPHIL NFR BLD: 1.8 %
ERYTHROCYTE [DISTWIDTH] IN BLOOD BY AUTOMATED COUNT: 13.9 %
EST. GFR  (AFRICAN AMERICAN): >60 ML/MIN/1.73 M^2
EST. GFR  (NON AFRICAN AMERICAN): >60 ML/MIN/1.73 M^2
GLUCOSE SERPL-MCNC: 93 MG/DL
HCT VFR BLD AUTO: 37 %
HGB BLD-MCNC: 12.5 G/DL
LYMPHOCYTES # BLD AUTO: 0.7 K/UL
LYMPHOCYTES NFR BLD: 13.5 %
MCH RBC QN AUTO: 30.4 PG
MCHC RBC AUTO-ENTMCNC: 33.8 G/DL
MCV RBC AUTO: 90 FL
MONOCYTES # BLD AUTO: 0.7 K/UL
MONOCYTES NFR BLD: 13.2 %
NEUTROPHILS # BLD AUTO: 3.9 K/UL
NEUTROPHILS NFR BLD: 71.1 %
PLATELET # BLD AUTO: 210 K/UL
PMV BLD AUTO: 8.1 FL
POTASSIUM SERPL-SCNC: 4.8 MMOL/L
PROT SERPL-MCNC: 7.1 G/DL
RBC # BLD AUTO: 4.12 M/UL
SODIUM SERPL-SCNC: 141 MMOL/L
WBC # BLD AUTO: 5.5 K/UL

## 2018-10-29 PROCEDURE — 80053 COMPREHEN METABOLIC PANEL: CPT

## 2018-10-29 PROCEDURE — 85025 COMPLETE CBC W/AUTO DIFF WBC: CPT

## 2018-10-29 PROCEDURE — 36415 COLL VENOUS BLD VENIPUNCTURE: CPT

## 2018-11-05 ENCOUNTER — OFFICE VISIT (OUTPATIENT)
Dept: HEMATOLOGY/ONCOLOGY | Facility: CLINIC | Age: 78
End: 2018-11-05
Payer: MEDICARE

## 2018-11-05 VITALS
HEART RATE: 68 BPM | DIASTOLIC BLOOD PRESSURE: 72 MMHG | RESPIRATION RATE: 20 BRPM | TEMPERATURE: 98 F | HEIGHT: 70 IN | BODY MASS INDEX: 37.04 KG/M2 | SYSTOLIC BLOOD PRESSURE: 132 MMHG

## 2018-11-05 DIAGNOSIS — E78.00 PURE HYPERCHOLESTEROLEMIA: ICD-10-CM

## 2018-11-05 DIAGNOSIS — K57.30 DIVERTICULOSIS OF LARGE INTESTINE WITHOUT HEMORRHAGE: Primary | ICD-10-CM

## 2018-11-05 PROCEDURE — 99214 OFFICE O/P EST MOD 30 MIN: CPT | Mod: S$GLB,,, | Performed by: INTERNAL MEDICINE

## 2018-11-05 PROCEDURE — 1101F PT FALLS ASSESS-DOCD LE1/YR: CPT | Mod: CPTII,S$GLB,, | Performed by: INTERNAL MEDICINE

## 2018-11-05 PROCEDURE — 99999 PR PBB SHADOW E&M-EST. PATIENT-LVL III: CPT | Mod: PBBFAC,,, | Performed by: INTERNAL MEDICINE

## 2018-11-05 NOTE — PROGRESS NOTES
A 78-year-old  male sent in consultation by Dr. Bruce.      Lesions in the liver and abdominal wall. bx done follow-up PET scan was arranged because this was not inconclusive biopsy  SIGNIFICANT HISTORY:  The patient had elevated PSA, which prompted a biopsy.    Pathology came back with a [place] 3+3, PSA 11.6, prostate cancer.  The patient   had a urogram that showed some abnormality in the liver and in abdominal wall.    It read as 1.5 x 1.2 x 0.9 cm nodular density along the right lateral margin of   the liver with loss of the normal fat plane.  Other couple of small 6-mm nodular   foci in the fat and also an anterior abdominal wall area 1.4- x 0.8-cm nodular   density and this is being read as consistent with serosal implants in the liver   and any implants to anterior abdominal wall with metastatic disease.  The   patient has no symptoms at all.  He is preparing for his granddaughter's   wedding.  He does have dyslipidemia and his newly diagnosed prostate cancer.    The patient did have colonoscopy done for colonic thickening noted on that   urogram CT and this colonoscopy was negative except for a few small adenomatous   Polyps.we did a PET  Prior to bx  ALLERGIES:  The patient has no known drug allergies.    MEDICATIONS:  Include Fosamax, Ecotrin, Casodex that was recently started,   Os-Leon, vitamin D3, and Flomax.    SOCIAL HISTORY:  Former smoker.  Alcohol social.  No recreational drug use.  He   is here with his wife today.    FAMILY HISTORY:  Noncontributory.    PREVIOUS SURGICAL HISTORY:  Significant for cystoscopies, transrectal biopsy,   hernia repair, tonsillectomy, adenoidectomy.    PHYSICAL EXAMINATION:  GENERAL:  Obese gentleman.  Comfortable looking patient.  Patient is in no   distress.  Awake, alert and oriented to time, person and place.  No anxiety, or   agitation.      HEENT:  Normal conjunctivae and eyelids.  WNL.  PERRLA 3 to 4 mm.  No icterus,   no pallor, no congestion, and  no discharge noted.     NECK:   Supple.  Trachea is central.  No crepitus.  No JVD or masses.    RESPIRATORY:  No intercostal retractions.  No dullness to percussion.  Chest is   clear to auscultation.  No rales, rhonchi or wheezes.  No crepitus.  Good air   entry bilaterally.    CARDIOVASCULAR:  S1 and S2 are normally heard without murmurs or gallops.  All   peripheral pulses are present.    ABDOMEN:  Normal abdomen.  No hepatosplenomegaly.  No free fluid.  Bowel sounds   are present.  No hernia noted. No masses.  No rebound or tenderness.  No   guarding or rigidity.  Umbilicus is midline.    LYMPHATICS:  No axillary, cervical, supraclavicular, submental, or inguinal   lymphadenopathy.    SKIN AND MUSCULOSKELETAL:  There is no evidence of excoriation marks or   ecchymosis.  No rashes.  No cyanosis.  No clubbing.  No joint or skeletal   deformities noted.  Normal range of motion.    NEUROLOGIC:  Higher functions are appropriate.  No cranial nerve deficits.    Normal gait.  Normal strength.  Motor and sensory functions are normal.  Deep   tendon reflexes are normal.    GENITAL AND RECTAL:  Exams are deferred.       Lab Results   Component Value Date    WBC 5.50 10/29/2018    HGB 12.5 (L) 10/29/2018    HCT 37.0 (L) 10/29/2018    MCV 90 10/29/2018     10/29/2018     CMP  Sodium   Date Value Ref Range Status   10/29/2018 141 136 - 145 mmol/L Final     Potassium   Date Value Ref Range Status   10/29/2018 4.8 3.5 - 5.1 mmol/L Final     Chloride   Date Value Ref Range Status   10/29/2018 105 95 - 110 mmol/L Final     CO2   Date Value Ref Range Status   10/29/2018 29 23 - 29 mmol/L Final     Glucose   Date Value Ref Range Status   10/29/2018 93 70 - 110 mg/dL Final     BUN, Bld   Date Value Ref Range Status   10/29/2018 19 8 - 23 mg/dL Final     Creatinine   Date Value Ref Range Status   10/29/2018 0.9 0.5 - 1.4 mg/dL Final   08/07/2018 1.11 0.60 - 1.40 mg/dL      Calcium   Date Value Ref Range Status   10/29/2018  9.6 8.7 - 10.5 mg/dL Final     Total Protein   Date Value Ref Range Status   10/29/2018 7.1 6.0 - 8.4 g/dL Final     Albumin   Date Value Ref Range Status   10/29/2018 3.9 3.5 - 5.2 g/dL Final     Total Bilirubin   Date Value Ref Range Status   10/29/2018 0.5 0.1 - 1.0 mg/dL Final     Comment:     For infants and newborns, interpretation of results should be based  on gestational age, weight and in agreement with clinical  observations.  Premature Infant recommended reference ranges:  Up to 24 hours.............<8.0 mg/dL  Up to 48 hours............<12.0 mg/dL  3-5 days..................<15.0 mg/dL  6-29 days.................<15.0 mg/dL       Alkaline Phosphatase   Date Value Ref Range Status   10/29/2018 52 (L) 55 - 135 U/L Final     AST   Date Value Ref Range Status   10/29/2018 14 10 - 40 U/L Final     ALT   Date Value Ref Range Status   10/29/2018 17 10 - 44 U/L Final     Anion Gap   Date Value Ref Range Status   10/29/2018 7 (L) 8 - 16 mmol/L Final     eGFR if    Date Value Ref Range Status   10/29/2018 >60 >60 mL/min/1.73 m^2 Final     eGFR if non    Date Value Ref Range Status   10/29/2018 >60 >60 mL/min/1.73 m^2 Final     Comment:     Calculation used to obtain the estimated glomerular filtration  rate (eGFR) is the CKD-EPI equation.      7/31/2018 laparascopic bx  FINAL PATHOLOGIC DIAGNOSIS  PERITONEAL MASS, RIGHT UPPER QUADRANT, BIOPSIES:  -Focal benign liver parenchyma with acute and chronic inflammation, surrounding granulation tissue with marked  acute and chronic inflammation and associated fibrosis (see comment).  -No evidence of malignancy.    IMPRESSION:  PET October 2018  Favorable change with the no longer hypermetabolism in the perihepatic soft  tissues.    No other abnormality seen and no new foci of metastasis is identified.    Read and electronically signed by: John Noriega MD on 11/2/2018 2:00 PM CDT      IMPRESSION:   1.  [place] 3+3 prostate cancer   completed xrt and continues follow up with DR. Nevarez receiving Trelstar   2.  PET showing perihepatic masses.  bx is negative ? Scar tissue .  Short-term follow-up scan in 3 months showing resolution hence no further follow-up required from Oncology  Patient can continue his care with Radiation Oncology and Dr. Bruce the only time I would see him again if this PSA rise with a biochemical or metastatic recurrence he will continue with PCP for all other needs

## 2018-11-12 ENCOUNTER — OFFICE VISIT (OUTPATIENT)
Dept: RADIATION ONCOLOGY | Facility: CLINIC | Age: 78
End: 2018-11-12
Payer: MEDICARE

## 2018-11-12 VITALS — BODY MASS INDEX: 34.45 KG/M2 | WEIGHT: 240.13 LBS

## 2018-11-12 DIAGNOSIS — C61 ADENOCARCINOMA OF PROSTATE: Primary | ICD-10-CM

## 2018-11-12 PROCEDURE — 99024 POSTOP FOLLOW-UP VISIT: CPT | Mod: ,,, | Performed by: RADIOLOGY

## 2018-11-12 NOTE — PROGRESS NOTES
Ramon Reilly  [id number]  1940  11/12/2018  Christie Bruce Md  [address] [place] [address] [address]  [place] [address]    DIAGNOSIS: Cancer Staging  Adenocarcinoma of prostate  Staging form: Prostate, AJCC 8th Edition  - Clinical: cT2b, cN0, PSA: 11.2, Grade Group: 1 - Signed by Jose Antonio Che Jr., MD on 8/7/2018    REASON FOR VISIT: Routine scheduled follow-up.    HISTORY OF PRESENT ILLNESS:   77M; on Flomax 2/2 LUTS x 6mos    PSA  2/18 9.7  5/18 11.2    *3/18 US ABD  1. Cholelithiasis.  2. Hepatomegaly and diffuse fatty infiltration of the liver.  *4/18 MRI P  PIRADS-4; posteriormedial to PZ, midgland to apex on the right  *6/18 CT UROGRAM  -Findings consistent with serosal implants on the liver and implant anterior abdominal wall and metastatic disease.  Questionable mass versus incomplete distention transverse colon.  Recommend further evaluation/workup.  -Additional findings as detailed above including enlarged prostate gland, diffuse bladder wall thickening, small hiatal hernia.  Right renal stone  *6/18 C-scope  1. Cecal polyp:  Tubular adenoma with no evidence of high-grade dysplasia.  2. Descending polyp:  Fragmented tubulovillous adenoma with no evidence of high-grade dysplasia.  *Dr. Bruce TRUS bx   - 3+3 adenoca (high volume) @ RIGHT LATERAL BASE, RIGHT BASE MEDIAL, RIGHT MID LATERAL, RIGHT MID MEDIAL, RIGHT APEX MEDIAL, RIGHT APEX LATERAL, RIGHT  TRANSITION ZONE, AND RIGHT APICAL NODULE   - 10/19 cores   - palpable 1cm nodule; recommend RT given age; ADT started  *Dr. Schwartz: liver bx negative    Patient completed definitive radiotherapy with concurrent short-term ADT to a total dose of 7740 cGy ending October 2018. Treatment was well tolerated.    INTERVAL HISTORY:   Since completion of therapy patient reports energy is slowly returning. He denies dysuria, hematuria, bright red blood per rectum. No diarrhea. Frequency, urgency, nocturia of urine slowly correcting. He is scheduled  to meet with Dr. Bruce for ADT and follow-up.    AUA 20 (6)  QOL 3 (2)  IIEF 1 (6); pre-existing ED, no sexual activity    Review of Systems   Constitutional: Negative for appetite change, chills, fever and unexpected weight change.   HENT:   Negative for lump/mass, mouth sores, sore throat, trouble swallowing and voice change.    Eyes: Negative for eye problems and icterus.   Respiratory: Negative for chest tightness, cough, hemoptysis and shortness of breath.    Cardiovascular: Negative for chest pain and leg swelling.   Gastrointestinal: Negative for abdominal distention, abdominal pain, blood in stool, constipation, diarrhea, nausea and vomiting.   Genitourinary: Positive for frequency and nocturia. Negative for bladder incontinence, difficulty urinating, dysuria and hematuria.    Musculoskeletal: Negative for back pain, gait problem, neck pain and neck stiffness.   Neurological: Negative for extremity weakness, gait problem, headaches, numbness and seizures.   Hematological: Negative for adenopathy.     Past Medical History:   Diagnosis Date    BPH (benign prostatic hyperplasia)     Cancer     prostate    Wears glasses      Past Surgical History:   Procedure Laterality Date    BIOPSY OF ABDOMINAL WALL N/A 7/31/2018    Procedure: BIOPSY, ABDOMINAL WALL;  Surgeon: Corky Paiz MD;  Location: [facility] OR;  Service: General;  Laterality: N/A;    BIOPSY, ABDOMINAL WALL N/A 7/31/2018    Performed by Corky Paiz MD at [facility] OR    CHOLECYSTECTOMY      CHOLECYSTECTOMY-LAPAROSCOPIC N/A 3/12/2018    Performed by Skyler Mcgovern MD at [facility] OR    COLONOSCOPY N/A 6/21/2018    Procedure: COLONOSCOPY;  Surgeon: Sally Aggarwal MD;  Location: [facility] ENDO;  Service: Endoscopy;  Laterality: N/A;    COLONOSCOPY N/A 6/21/2018    Performed by Sally Aggarwal MD at [facility] ENDO    CYSTOSCOPY N/A 6/4/2018    Procedure: CYSTOSCOPY;  Surgeon: Christie Bruce MD;  [facility]: [facility] OR;  Service: Urology;   Laterality: N/A;    CYSTOSCOPY N/A 2018    Per[facility] by Christie Bruce [facility]    DIAGNOSTIC LAPAROSCOPY N/A 2018    Procedure: LAPAROSCOPY, DIAGNOSTIC;  Surgeon: Corky Paiz MD;  Location: [facility] OR;  Service: General;  Laterality: N/A;  diagnostic lap, biopsy of abdominal wall mass    EYE SURGERY Right     eye lid surgery     GOLD MARKERS- N/A 2018    Performed by Christie Bruce MD at [facility] OR    HERNIA REPAIR  1990    LAPAROSCOPY, DIAGNOSTIC N/A 2018    Performed by Corky Paiz MD at [facility] OR    TONSILLECTOMY, ADENOIDECTOMY      TRANSRECTAL BIOPSY OF PROSTATE WITH ULTRASOUND GUIDANCE N/A 2018    Procedure: TRANSRECTAL ULTRASOUND BIOPSY;  Surgeon: Christie Bruce MD;  Location: [facility] OR;  Service: Urology;  Laterality: N/A;    TRANSRECTAL ULTRASOUND BIOPSY N/A 2018    Performed by Christie Bruce MD at [facility] OR    TRANSRECTAL ULTRASOUND EXAMINATION N/A 2018    Procedure: GOLD MARKERS-;  Surgeon: Christie Bruce MD;  Location: [facility] OR;  Service: Urology;  Laterality: N/A;  TRUS GOLD MARKERS      WISDOM TOOTH EXTRACTION       Social History     Socioeconomic History    Marital status:      Spouse name: None    Number of children: None    Years of education: None    Highest education level: None   Social Needs    Financial resource strain: None    Food insecurity - worry: None    Food insecurity - inability: None    Transportation needs - medical: None    Transportation needs - non-medical: None   Occupational History    None   Tobacco Use    Smoking status: Former Smoker     Last attempt to quit: 3/12/1988     Years since quittin.6    Smokeless tobacco: Never Used   Substance and Sexual Activity    Alcohol use: Yes     Comment: social, at weddings    Drug use: No    Sexual activity: No   Other Topics Concern    None   Social History Narrative    None     Family History   Problem Relation Age of  Onset    Eczema Neg Hx     Lupus Neg Hx     Psoriasis Neg Hx     Melanoma Neg Hx         Medication List           Accurate as of 11/12/18 10:37 AM. If you have any questions, ask your nurse or doctor.               CONTINUE taking these medications    aspirin 81 MG EC tablet  Commonly known as:  ECOTRIN     calcium carbonate 500 mg calcium (1,250 mg) tablet  Commonly known as:  OS-IDA  Take 1 tablet (500 mg total) by mouth once daily.     FLUZONE HIGH-DOSE 2018-19 (PF) 180 mcg/0.5 mL vaccine  Generic drug:  influenza     tamsulosin 0.4 mg Cap  Commonly known as:  FLOMAX  Take 1 capsule (0.4 mg total) by mouth after dinner.     VITAMIN D3 1,000 unit capsule  Generic drug:  cholecalciferol (vitamin D3)          Review of patient's allergies indicates:  No Known Allergies    QUALITY OF LIFE: 90%- Able to Carry on Normal Activity: Minor Symptoms of Disease    Vitals:    11/12/18 0917   Weight: 108.9 kg (240 lb 1.6 oz)   PainSc: 0-No pain       PHYSICAL EXAM:   GENERAL: alert; in no apparent distress.   HEAD: normocephalic, atraumatic.  EYES: pupils are equal, round, reactive to light and accommodation. Sclera anicteric. Conjunctiva not injected.   NOSE/THROAT: no nasal erythema or rhinorrhea. Oropharynx pink, without erythema, ulcerations or thrush.   NECK: no cervical motion rigidity; supple with no masses.  CHEST: Patient is speaking comfortably on room air with normal work of breathing without using accessory muscles of respiration.  ABDOMEN: soft, nontender, nondistended. Bowel sounds present.   MUSCULOSKELETAL: no tenderness to palpation along the spine or scapulae. Normal range of motion.  NEUROLOGIC: cranial nerves II-XII intact bilaterally. Strength 5/5 in bilateral upper and lower extremities. No sensory deficits appreciated.  Normal gait.  EXTREMITIES: no clubbing, cyanosis, edema.  SKIN: no erythema, rashes, ulcerations noted.     ANCILLARY DATA:   11/18 PET/CT  Favorable change with the no longer  hypermetabolism in the perihepatic soft  tissues.  No other abnormality seen and no new foci of metastasis is identified.    10/18 PSA  0.2    ASSESSMENT: 78M with Intermediate risk prostate adenocarcinoma, [id number] GS 3+3 (high volume) iPSA 11.2 status post short-term hormone deprivation therapy and radiotherapy to 7740 cGy ending October 2018.  PLAN:  Mr. Reilly continues to recover from his radiotherapy. He has mild residual cystitis. I expect his symptomatology score some improvement in the coming weeks. He'll continue to follow with Dr. Bruce and likely receive further hormone deprivation for the balance of 6 months. Pleased with his tolerance of therapy and there is no evidence of disease at today's visit. Recommend PSA every 3 months we'll continue to trend for detection of andrea and expect PSA bounce after discontinuation of hormone deprivation. End of treatment PSA 0.2. Notably he has clear PET/CT scan from this month and biopsy negative at questionable liver implant. He also follows with Dr. Schwartz. Return to clinic in 6 months.    All questions answered and contact information provided. Patient understands free to call us anytime with any questions or concerns regarding radiation therapy.    I have personally seen and evaluated this patient. Greater than 50% of this time was spent discussing coordination of care and/or counseling.    PHYSICIAN: Jose Antonio Che Jr, MD

## 2018-11-27 ENCOUNTER — TELEPHONE (OUTPATIENT)
Dept: UROLOGY | Facility: CLINIC | Age: 78
End: 2018-11-27

## 2018-11-27 NOTE — TELEPHONE ENCOUNTER
----- Message from Simon Burrows sent at 11/27/2018 10:48 AM CST -----  Contact: Patient  Type: Reschedule Request    Caller would like to reschedule an appointment.    Who Called: Patient  Original Appointment Scheduled On: 11/30 @ 10:45  Best Call Back Number: [phone]  Additional Information: No available appointments until March 19. Please call to advise. Thanks!

## 2018-11-27 NOTE — TELEPHONE ENCOUNTER
Spoke with patient appointment rescheduled from 11/30 to 12/4. Patient verbally voiced understanding.

## 2018-11-28 ENCOUNTER — LAB VISIT (OUTPATIENT)
Dept: LAB | Facility: HOSPITAL | Age: 78
End: 2018-11-28
Attending: UROLOGY
Payer: MEDICARE

## 2018-11-28 DIAGNOSIS — C61 PROSTATE CANCER: ICD-10-CM

## 2018-11-28 LAB — COMPLEXED PSA SERPL-MCNC: 0.09 NG/ML

## 2018-11-28 PROCEDURE — 36415 COLL VENOUS BLD VENIPUNCTURE: CPT

## 2018-11-28 PROCEDURE — 84153 ASSAY OF PSA TOTAL: CPT

## 2018-12-04 ENCOUNTER — OFFICE VISIT (OUTPATIENT)
Dept: UROLOGY | Facility: CLINIC | Age: 78
End: 2018-12-04
Payer: MEDICARE

## 2018-12-04 ENCOUNTER — APPOINTMENT (OUTPATIENT)
Dept: LAB | Facility: HOSPITAL | Age: 78
End: 2018-12-04
Attending: UROLOGY
Payer: MEDICARE

## 2018-12-04 VITALS
HEART RATE: 69 BPM | DIASTOLIC BLOOD PRESSURE: 79 MMHG | SYSTOLIC BLOOD PRESSURE: 123 MMHG | BODY MASS INDEX: 36.63 KG/M2 | WEIGHT: 255.88 LBS | HEIGHT: 70 IN

## 2018-12-04 DIAGNOSIS — C61 PROSTATE CANCER: Primary | ICD-10-CM

## 2018-12-04 DIAGNOSIS — N40.0 BENIGN PROSTATIC HYPERPLASIA, UNSPECIFIED WHETHER LOWER URINARY TRACT SYMPTOMS PRESENT: ICD-10-CM

## 2018-12-04 DIAGNOSIS — R35.1 NOCTURIA: ICD-10-CM

## 2018-12-04 DIAGNOSIS — R31.0 GROSS HEMATURIA: ICD-10-CM

## 2018-12-04 DIAGNOSIS — N20.0 NEPHROLITHIASIS: ICD-10-CM

## 2018-12-04 LAB
BILIRUB SERPL-MCNC: ABNORMAL MG/DL
BILIRUB UR QL STRIP: NEGATIVE
BLOOD URINE, POC: ABNORMAL
CLARITY UR: CLEAR
COLOR UR: YELLOW
COLOR, POC UA: YELLOW
GLUCOSE UR QL STRIP: ABNORMAL
GLUCOSE UR QL STRIP: NEGATIVE
HGB UR QL STRIP: NEGATIVE
KETONES UR QL STRIP: ABNORMAL
KETONES UR QL STRIP: NEGATIVE
LEUKOCYTE ESTERASE UR QL STRIP: NEGATIVE
LEUKOCYTE ESTERASE URINE, POC: ABNORMAL
NITRITE UR QL STRIP: NEGATIVE
NITRITE, POC UA: ABNORMAL
PH UR STRIP: 7 [PH] (ref 5–8)
PH, POC UA: 7
PROT UR QL STRIP: NEGATIVE
PROTEIN, POC: ABNORMAL
SP GR UR STRIP: 1.02 (ref 1–1.03)
SPECIFIC GRAVITY, POC UA: 1.01
URN SPEC COLLECT METH UR: NORMAL
UROBILINOGEN UR STRIP-ACNC: 1 EU/DL
UROBILINOGEN, POC UA: ABNORMAL

## 2018-12-04 PROCEDURE — 81002 URINALYSIS NONAUTO W/O SCOPE: CPT | Mod: S$GLB,,, | Performed by: UROLOGY

## 2018-12-04 PROCEDURE — 1101F PT FALLS ASSESS-DOCD LE1/YR: CPT | Mod: CPTII,S$GLB,, | Performed by: UROLOGY

## 2018-12-04 PROCEDURE — 99999 PR PBB SHADOW E&M-EST. PATIENT-LVL III: CPT | Mod: PBBFAC,,, | Performed by: UROLOGY

## 2018-12-04 PROCEDURE — 81003 URINALYSIS AUTO W/O SCOPE: CPT

## 2018-12-04 PROCEDURE — 99214 OFFICE O/P EST MOD 30 MIN: CPT | Mod: 25,S$GLB,, | Performed by: UROLOGY

## 2018-12-04 NOTE — PATIENT INSTRUCTIONS
psa every 3 months for 3 years then psa every 6 months for 2years then yearly after this  I will order all psa  F/u 3 months with radiation oncology  F/u in 6 months with me    CALL me if you have difficulty urinating and want to restart flomax

## 2018-12-04 NOTE — PROGRESS NOTES
[facility] Note - [place]  Staff: MD Janis    Referring provider and please cc: [place] and   PCP:  (leaving), changing [facility]    Chief Complaint: elevated psa    Subjective:        HPI: Ramon Reilly is a 78 y.o. male presents with     Prostate cancer, [id number], psa 11.2, Gl 3+3 s/p XRT and ADT x 6 months  -referred by  to urology for elevated psa 9.7 found on workup for enlarged prostate.  LIZZIE revealed nodular prostate and referred to  who saw him on 3/28/18 and ordered an MRI. Seen by me for the first time 5/29/18 to discuss MRI and a LIZZIE by me revealed a 1cm prostate nodule. A  ctu done for  6/1/18 multiple serosal liver lesions and anterior abdominal wall lesions and 5mm non-obstructing rmp stone. Cysto trus biopsy on 6/4/18 showed 55g prostate and 10/17 cores + for Gl 3+3, all on right. After serosal lesion workup negative proceeded with prostate cancer radiation treatment. See below for psa history and treatment    He returns today and says he is tired but otherwise doing well.     11/28/18 0.09  10/19/18 0.2  8/18-10/18 radiotherapy to 7740 cGy ending October 2018.  7/30/18 Gold markers  7/10/18 trelstar 22.5mg, on vit D and calcium   6/4/18  trus bx: Gl 3+3, 10/17 cores + for Gl 3+3, all on right  5/29/18 11.2, LIZZIE: 1cm right sided prostate nodule   4/25/18 MRI: Pirads 4 posteriormedial to PZ, midgland to apex on the right   1/30/18 9.7    Serosal and abdominal wall implants  -scheduled him with  who did a colonscopy and did not find any colon mass. PET Ct revealed michael-hepatic masses.  Eventually underwent biopsy on 7/31/18 which revealed granulation tissue. His f/u PET CT was negative after completion of radiation for his prostate cancer.     Right mid pole stone  Found on ctu. No flank pain    bph with luts and nocturia  -started on flomax 0.4mg by  in January for weak stream, nocturia  3-4x a night which improved with flomax 0.4mg. pvr by bladder scan: 63cc. cysto trus biopsy. trus volume was 55g.   Uroflow results (date: 07/09/2018) on flomax 0.4mg  : Voiding time: 22.8s, Flow time: 20.5s, TTP flow: 3.8s, Peak flowrate: 13.7 mL/s, Average flowrate: 8.0mL/s, Intervals: 4,  Voided volume: 163 mL,  Pvr by bladder scan: 208. Pattern of curve: bell with slightly delayed empty  Uroflow results (date: 07/12/2018) on flomax 0.8mg nightly   : Voiding time: 32.0s, Flow time: 31.9s, TTP flow: 3.6s, Peak flowrate: 9.4 mL/s, Average flowrate: 4.0mL/s, Intervals: 1,  Voided volume: 128 mL,  Pvr by bladder scan: 76ml. Pattern of curve: bell with delayed drainage    I had increased him from 0.4 to 0.8 for persistent slow stream but his sx improved and he was dropped back down to 0.4. Good urine stream. Nocturia  Unchanged.       IIEF: 5    ECOG Status: 0    Gross hematuria  -had chest pain 3/4/18, found to have cholecystitis and was referred for cholecystectomy.  UA at ER was negative that day  but when he went home he saw pink urine for 2 days. No uti symptoms. F/u ua on 3/19/18 negative.  -on no blood thinners except for asa, no h/o stones, former smoker (30 pack year history). No stds in the past. '    -workup has been negative  -ctu 6/1/18 multiple serosal liver lesions and anterior abdominal wall lesions and 5mm non-obstructing rmp stone  -5/29/18 cytology and culture negative  -cysto 6/4/18 negative    UA today: tr protein/tr blood - sent for automated  Urine history:  7/26/18 Ng, void: neg  5/29/18 Ng, void: neg, cytology: negative  3/19/18 No cx, void: neg  3/4/18  No cx, void: neg    Vasectomy: no    REVIEW OF SYSTEMS:  General ROS: no fevers, no chills  Psychological ROS: no depression  Endocrine ROS: no heat or cold  Respiratory ROS: no SOB  Cardiovascular ROS: no CP  Gastrointestinal ROS: no abdominal pain, no constipation, no diarrhea, no BRBPR  Musculoskeletal ROS: no muscle pain  Neurological ROS:  no headaches  Dermatological ROS: no rashes  HEENT: +glasses, no sinus   ROS: per HPI     PMHx:  Past Medical History:   Diagnosis Date    BPH (benign prostatic hyperplasia)     Cancer     prostate    Wears glasses      Kidney stones: No  Cataracts? Beginning      PSHx:  Past Surgical History:   Procedure Laterality Date    BIOPSY OF ABDOMINAL WALL N/A 7/31/2018    Procedure: BIOPSY, ABDOMINAL WALL;  Surgeon: Corky Paiz MD;  Location: [facility] OR;  Service: General;  Laterality: N/A;    BIOPSY, ABDOMINAL WALL N/A 7/31/2018    Performed by Corky Paiz MD at [facility] OR    CHOLECYSTECTOMY      CHOLECYSTECTOMY-LAPAROSCOPIC N/A 3/12/2018    Performed by Skyler Mcgovern MD at [facility] OR    COLONOSCOPY N/A 6/21/2018    Procedure: COLONOSCOPY;  Surgeon: Sally Aggarwal MD;  Location: [facility] ENDO;  Service: Endoscopy;  Laterality: N/A;    COLONOSCOPY N/A 6/21/2018    Performed by Sally Aggarwal MD at [facility] ENDO    CYSTOSCOPY N/A 6/4/2018    Procedure: CYSTOSCOPY;  Surgeon: Christie Bruce MD;  Location: [facility] OR;  Service: Urology;  Laterality: N/A;    CYSTOSCOPY N/A 6/4/2018    Performed by Christie Bruce MD at [facility] OR    DIAGNOSTIC LAPAROSCOPY N/A 7/31/2018    Procedure: LAPAROSCOPY, DIAGNOSTIC;  Surgeon: Corky Paiz MD;  Location: [facility] OR;  Service: General;  Laterality: N/A;  diagnostic lap, biopsy of abdominal wall mass    EYE SURGERY Right     eye lid surgery     GOLD MARKERS- N/A 7/30/2018    Performed by Christie Bruce MD at [facility] OR    HERNIA REPAIR  1990    LAPAROSCOPY, DIAGNOSTIC N/A 7/31/2018    Performed by [facility]y [facility]iz MD at [facility] OR    TONSILLECTOMY, ADENOIDECTOMY      TRANSRECTAL BIOPSY OF PROSTATE WITH ULTRASOUND GUIDANCE N/A 6/4/2018    Procedure: TRANSRECTAL ULTRASOUND BIOPSY;  Surgeon: Christie Bruce MD;  Location: [facility] OR;  Service: Urology;  Laterality: N/A;    TRANSRECTAL ULTRASOUND BIOPSY N/A 6/4/2018    Performed by Christie PHILLIPS  MD Janis at [facility] OR    TRANSRECTAL ULTRASOUND EXAMINATION N/A 2018    Procedure: GOLD MARKERS-;  Surgeon: Christie Bruce MD;  Location: [facility] OR;  Service: Urology;  Laterality: N/A;  TRUS GOLD MARKERS      WISDOM TOOTH EXTRACTION     umbilical hernia repair with recurrence    Stents/Valves/Foreign Bodies: Yes   Cardiac Evaluation: No    Screening Studies  Colonoscopy: none     Fam Hx:   malignancies: No  . Gyn malignancies: mother had breast cancer. Mother  a 72 of breast cancer. Father  a 77 of MI  kidney stones: No     Soc Hx:  Former tobacco, quit 1980s.  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    Medications: reviewed   Anticoagulation: Yes - asa 81mg daily     Objective:     Vitals:    18 1552   BP: 123/79   Pulse: 69       General:WDWN in NAD  Eyes: PERRLA, normal conjunctiva  Respiratory: No increased work on breathing.   Cardiovascular: No obvious extremity edema. Warm and well perfused.   GI: palpation of masses. No tenderness. No hepatosplenomegaly to palpation.  Musculoskeletal: normal range of motion of bilateral upper extremities. Normal muscle strength and tone.  Skin: no obvious rashes or lesions. No tightening of skin noted.  Neurologic: CN grossly normal. Normal sensation.   Psychiatric: awake, alert and oriented x 3. Mood and affect normal. Cooperative.     2018:  Inspection of anus normal  No scrotal rashes, cysts or lesions  Epididymis normal in size, no tenderness  Testes normal and size, equal size bilaterally, no masses  Urethral meatus normal without discharge  Penis is circumcised,    LIZZIE: 40g gland 1cm prostate nodule right side mid gland towards apex, tenderness. SV not palpable. Normal sphincter tone. No hemhorroids.  No bilateral inguinal hernias noted        LABS REVIEW:      Cr:   Lab Results   Component Value Date    CREATININE 0.9 10/29/2018       PATHOLOGY REVIEW:  trus biopsy 6/4/18:   PROSTATE BIOPSIES 5., 6., 7., 8., 11., 12., AND 13.:  NO CARCINOMA IDENTIFIED  BIOPSIES OF 1. RIGHT LATERAL BASE, 2. RIGHT BASE MEDIAL, 3. RIGHT MID LATERAL, 4. RIGHT MID  MEDIAL, 9. RIGHT APEX MEDIAL, 10. RIGHT APEX LATERAL, 14. RIGHT TRANSITION ZONE, AND 15.  RIGHT APICAL NODULE: ADENOCARCINOMA TOTAL GERMAINE SCORE 6 (3+ 3)    Adenocarcinoma involves 30% of specimen 1, 65% of 2, 60% of 3, 60% of 4 (with involvement of both cores), 60% of 9 (involvement of all 3 cores), 80% of 10, 15% of 14, and 75% of 15. The pattern is that of Sims grade 3. The verysmall tissue fragments grossly described are interpreted as being adjacent connective tissue, as opposed to reflectingadditional core biopsies.    Urine cytology 5/31/18: Negative for malignant cells.    RADIOGRAPHIC REVIEW:  11/2/18 pet for abdominal lesions  No visceromegaly, visceral or peritoneal masses or ascites seen.  There are no abnormal foci of hypermetabolism in the previously seen  perihepatic foci of hypermetabolism has regressed and no longer hypermetabolic.    ctu 6/1/18 done for gross hematuria  5mm RMP stone. No renal masses, no hydro  subcm likely cysts on both kidneys  Prostate enlarged with diffuse bladder wall thickening  Small nodular serosal implants on the live, anterior abdominal wall suspicious for metastatic dz  Questionable transverse colon mass     MRI of the prostate with and without contrast at [facility] (4/25/18). Volume: 63g. PZ: pirads 4 posterior to posteromedial PZ of the midgland to apex on the right with epicenter in the midgland. No gross macrocopic EPe noted but does bulge the capsule.      Assessment:       1. Prostate cancer    2. Benign prostatic hyperplasia, unspecified whether lower urinary tract symptoms present    3. Gross hematuria    4. Nephrolithiasis          Plan:      Prostate Cancer, lH8wEu6+3, Intermediate Risk  Int dz-6 months adt only, also c/o fatigue. No further ADT recommended  psa every 3 months for 3 years then psa every 6 months for 2years then yearly after this  I will order all psa  F/u 3 months with radiation oncology  F/u in 6 months with me    Nocturia  -could be related to LE edema from daytime or sleep apnea undiagnosed (denies snoring). Overall not that bothersome today    BPH with increasing weakness of stream  -radiation can results in decrease in size of prostate and therefore prostate symptoms  -can hold off on taking flomax at all and he will call for refills if having problems, urine stream slower     Gross hematuria, RMP stone  -workup was negative except for right renal stone. Ctu, cytology, cystoscopy and urine culture all  Negative.     F/u as above    Christie Bruce MD

## 2019-03-04 ENCOUNTER — LAB VISIT (OUTPATIENT)
Dept: LAB | Facility: HOSPITAL | Age: 79
End: 2019-03-04
Attending: UROLOGY
Payer: MEDICARE

## 2019-03-04 DIAGNOSIS — N40.0 BENIGN PROSTATIC HYPERPLASIA, UNSPECIFIED WHETHER LOWER URINARY TRACT SYMPTOMS PRESENT: ICD-10-CM

## 2019-03-04 DIAGNOSIS — C61 PROSTATE CANCER: ICD-10-CM

## 2019-03-04 LAB — COMPLEXED PSA SERPL-MCNC: 0.04 NG/ML

## 2019-03-04 PROCEDURE — 84153 ASSAY OF PSA TOTAL: CPT

## 2019-03-04 PROCEDURE — 36415 COLL VENOUS BLD VENIPUNCTURE: CPT | Mod: PO

## 2019-05-13 ENCOUNTER — OFFICE VISIT (OUTPATIENT)
Dept: RADIATION ONCOLOGY | Facility: CLINIC | Age: 79
End: 2019-05-13
Payer: MEDICARE

## 2019-05-13 VITALS
SYSTOLIC BLOOD PRESSURE: 149 MMHG | HEART RATE: 64 BPM | WEIGHT: 240 LBS | DIASTOLIC BLOOD PRESSURE: 87 MMHG | BODY MASS INDEX: 34.44 KG/M2

## 2019-05-13 DIAGNOSIS — C61 ADENOCARCINOMA OF PROSTATE: Primary | ICD-10-CM

## 2019-05-13 PROCEDURE — 99213 PR OFFICE/OUTPT VISIT, EST, LEVL III, 20-29 MIN: ICD-10-PCS | Mod: ,,, | Performed by: RADIOLOGY

## 2019-05-13 PROCEDURE — 99213 OFFICE O/P EST LOW 20 MIN: CPT | Mod: ,,, | Performed by: RADIOLOGY

## 2019-05-13 PROCEDURE — 1101F PT FALLS ASSESS-DOCD LE1/YR: CPT | Mod: ,,, | Performed by: RADIOLOGY

## 2019-05-13 PROCEDURE — 1101F PR PT FALLS ASSESS DOC 0-1 FALLS W/OUT INJ PAST YR: ICD-10-PCS | Mod: ,,, | Performed by: RADIOLOGY

## 2019-05-13 NOTE — PROGRESS NOTES
Ramon Reilly  [id number]  1940  5/13/2019  Christie Bruce Md  [address] [place] [address] [address]  [place] [address]    DIAGNOSIS: Cancer Staging  Adenocarcinoma of prostate  Staging form: Prostate, AJCC 8th Edition  - Clinical: cT2b, cN0, PSA: 11.2, Grade Group: 1 - Signed by Jose Antonio Che Jr., MD on 8/7/2018    REASON FOR VISIT: Routine scheduled follow-up.    HISTORY OF PRESENT ILLNESS:   77M; on Flomax 2/2 LUTS x 6mos    PSA  2/18 9.7  5/18 11.2    *3/18 US ABD  1. Cholelithiasis.  2. Hepatomegaly and diffuse fatty infiltration of the liver.  *4/18 MRI P  PIRADS-4; posteriormedial to PZ, midgland to apex on the right  *6/18 CT UROGRAM  -Findings consistent with serosal implants on the liver and implant anterior abdominal wall and metastatic disease.  Questionable mass versus incomplete distention transverse colon.  Recommend further evaluation/workup.  -Additional findings as detailed above including enlarged prostate gland, diffuse bladder wall thickening, small hiatal hernia.  Right renal stone  *6/18 C-scope  1. Cecal polyp:  Tubular adenoma with no evidence of high-grade dysplasia.  2. Descending polyp:  Fragmented tubulovillous adenoma with no evidence of high-grade dysplasia.  *Dr. Bruce TRUS bx   - 3+3 adenoca (high volume) @ RIGHT LATERAL BASE, RIGHT BASE MEDIAL, RIGHT MID LATERAL, RIGHT MID MEDIAL, RIGHT APEX MEDIAL, RIGHT APEX LATERAL, RIGHT  TRANSITION ZONE, AND RIGHT APICAL NODULE   - 10/19 cores   - palpable 1cm nodule; recommend RT given age; ADT started  *Dr. Schwartz: liver bx negative    Patient completed definitive radiotherapy with concurrent short-term ADT to a total dose of 7740 cGy ending October 2018. Treatment was well tolerated.    INTERVAL HISTORY:   Since completion of therapy patient reports energy remains low. He denies dysuria, hematuria, bright red blood per rectum. No diarrhea; mild constipation; bowel movement every 2 days. Nocturia ×2-3. He continues with  Dr. Bruce. He has completed hormone deprivation.     AUA 6 (6)  QOL 2 (2)  IIEF 1 (6); pre-existing ED, no sexual activity    Review of Systems   Constitutional: Negative for appetite change, chills, fever and unexpected weight change.   HENT:   Negative for lump/mass, mouth sores, sore throat, trouble swallowing and voice change.    Eyes: Negative for eye problems and icterus.   Respiratory: Negative for chest tightness, cough, hemoptysis and shortness of breath.    Cardiovascular: Negative for chest pain and leg swelling.   Gastrointestinal: Positive for constipation. Negative for abdominal distention, abdominal pain, blood in stool, diarrhea, nausea and vomiting.   Genitourinary: Positive for frequency and nocturia. Negative for bladder incontinence, difficulty urinating, dysuria and hematuria.    Musculoskeletal: Negative for back pain, gait problem, neck pain and neck stiffness.   Neurological: Negative for extremity weakness, gait problem, headaches, numbness and seizures.   Hematological: Negative for adenopathy.     Past Medical History:   Diagnosis Date    BPH (benign prostatic hyperplasia)     Cancer     prostate    Wears glasses      Past Surgical History:   Procedure Laterality Date    BIOPSY, ABDOMINAL WALL N/A 7/31/2018    [facility] OR    CHOLECYSTECTOMY      CHOLECYSTECTOMY-LAPAROSCOPIC N/A 3/12/2018    [facility]govern [facility] OR    COLONOSCOPY N/A 6/21/2018    [facility] ENDO    CYSTOSCOPY N/A 6/4/2018    [facility]    EYE SURGERY Right     eye lid surgery     GOLD MARKERS- N/A 7/30/2018    [facility]    HERNIA REPAIR  1990    LAPAROSCOPY, DIAGNOSTIC N/A 7/31/2018    [facility] OR    TONSILLECTOMY, ADENOIDECTOMY      TRANSRECTAL ULTRASOUND BIOPSY N/A 6/4/2018    [facility] Christie PHILLIPS  [facility]    WISDOM TOOTH EXTRACTION       Social History     Socioeconomic History    Marital status:      Spouse name: Not on file    Number of children: Not on file    Years of education: Not on file    Highest education level: Not on file   Occupational History    Not on file   Social Needs    Financial resource strain: Not on file    Food insecurity:     Worry: Not on file     Inability: Not on file    Transportation needs:     Medical: Not on file     Non-medical: Not on file   Tobacco Use    Smoking status: Former Smoker     Last attempt to quit: 3/12/1988     Years since quittin.1    Smokeless tobacco: Never Used   Substance and Sexual Activity    Alcohol use: Yes     Comment: social, at weddings    Drug use: No    Sexual activity: Never   Lifestyle    Physical activity:     Days per week: Not on file     Minutes per session: Not on file    Stress: Not on file   Relationships    Social connections:     Talks on phone: Not on file     Gets together: Not on file     Attends Denominational service: Not on file     Active member of club or organization: Not on file     Attends meetings of clubs or organizations: Not on file     Relationship status: Not on file   Other Topics Concern    Not on file   Social History Narrative    Not on file     Family History   Problem Relation Age of Onset    Eczema Neg Hx     Lupus Neg Hx     Psoriasis Neg Hx     Melanoma Neg Hx      Medication List with Changes/Refills   Current Medications    ASPIRIN (ECOTRIN) 81 MG EC TABLET    Take 81 mg by mouth once daily.    CALCIUM CARBONATE (OS-IDA) 500 MG CALCIUM (1,250 MG) TABLET    Take 1 tablet (500 mg total) by mouth once daily.    CHOLECALCIFEROL, VITAMIN D3, (VITAMIN D3) 1,000 UNIT CAPSULE    Take 1,000 Units by mouth once daily.     Review of patient's allergies indicates:  No Known Allergies    QUALITY OF LIFE: 90%- Able to Carry on Normal Activity: Minor Symptoms of  Disease    Vitals:    05/13/19 1055   BP: (!) 149/87   Pulse: 64   Weight: 108.9 kg (240 lb)   PainSc: 0-No pain       PHYSICAL EXAM:   GENERAL: alert; in no apparent distress.   HEAD: normocephalic, atraumatic.  EYES: pupils are equal, round, reactive to light and accommodation. Sclera anicteric. Conjunctiva not injected.   NOSE/THROAT: no nasal erythema or rhinorrhea. Oropharynx pink, without erythema, ulcerations or thrush.   NECK: no cervical motion rigidity; supple with no masses.  CHEST: Patient is speaking comfortably on room air with normal work of breathing without using accessory muscles of respiration.  ABDOMEN: soft, nontender, nondistended. Bowel sounds present.   MUSCULOSKELETAL: no tenderness to palpation along the spine or scapulae. Normal range of motion.  NEUROLOGIC: cranial nerves II-XII intact bilaterally. Strength 5/5 in bilateral upper and lower extremities. No sensory deficits appreciated.  Normal gait.  EXTREMITIES: no clubbing, cyanosis, edema.  SKIN: no erythema, rashes, ulcerations noted.     ANCILLARY DATA:   11/18 PET/CT  Favorable change with the no longer hypermetabolism in the perihepatic soft  tissues.  No other abnormality seen and no new foci of metastasis is identified.  PSA  10/18  0.2  3/19 0.04    ASSESSMENT: 78M with Intermediate risk prostate adenocarcinoma, [id number] GS 3+3 (high volume) iPSA 11.2 status post short-term hormone deprivation therapy and radiotherapy to 7740 cGy ending October 2018.  PLAN:  Mr. Reilly is having mild constipation, using stool softeners I recommended institution of MiraLAX as well as electrolyte rich fluids. Please with PSA response. By review of systems and physical exam no evidence of disease. We'll add a testosterone level to his PSA laboratory eval to investigate persistent fatigue and he will follow-up Dr. Bruce next month. Return to clinic in 1 year.    All questions answered and contact information provided. Patient understands free to  call us anytime with any questions or concerns regarding radiation therapy.    I have personally seen and evaluated this patient. Greater than 50% of this time was spent discussing coordination of care and/or counseling.    PHYSICIAN: Jose Antonio Che Jr, MD

## 2019-05-14 ENCOUNTER — TELEPHONE (OUTPATIENT)
Dept: RADIATION ONCOLOGY | Facility: CLINIC | Age: 79
End: 2019-05-14

## 2019-05-14 DIAGNOSIS — C61 MALIGNANT NEOPLASM OF PROSTATE: Primary | ICD-10-CM

## 2019-05-14 NOTE — TELEPHONE ENCOUNTER
----- Message from Jose Antonio Che Jr., MD sent at 5/13/2019 11:47 AM CDT -----  Please add TEST level to PSA upcoming; thanks

## 2019-05-31 ENCOUNTER — LAB VISIT (OUTPATIENT)
Dept: LAB | Facility: HOSPITAL | Age: 79
End: 2019-05-31
Attending: UROLOGY
Payer: MEDICARE

## 2019-05-31 DIAGNOSIS — C61 PROSTATE CANCER: ICD-10-CM

## 2019-05-31 DIAGNOSIS — N40.0 BENIGN PROSTATIC HYPERPLASIA, UNSPECIFIED WHETHER LOWER URINARY TRACT SYMPTOMS PRESENT: ICD-10-CM

## 2019-05-31 LAB — COMPLEXED PSA SERPL-MCNC: 0.1 NG/ML (ref 0–4)

## 2019-05-31 PROCEDURE — 84153 ASSAY OF PSA TOTAL: CPT

## 2019-05-31 PROCEDURE — 36415 COLL VENOUS BLD VENIPUNCTURE: CPT | Mod: PO

## 2019-06-04 ENCOUNTER — OFFICE VISIT (OUTPATIENT)
Dept: UROLOGY | Facility: CLINIC | Age: 79
End: 2019-06-04
Payer: MEDICARE

## 2019-06-04 VITALS
DIASTOLIC BLOOD PRESSURE: 74 MMHG | SYSTOLIC BLOOD PRESSURE: 133 MMHG | TEMPERATURE: 98 F | HEIGHT: 70 IN | BODY MASS INDEX: 34.32 KG/M2 | HEART RATE: 63 BPM | WEIGHT: 239.75 LBS

## 2019-06-04 DIAGNOSIS — R19.00 ABDOMINAL MASS, UNSPECIFIED ABDOMINAL LOCATION: ICD-10-CM

## 2019-06-04 DIAGNOSIS — C61 PROSTATE CANCER: Primary | ICD-10-CM

## 2019-06-04 DIAGNOSIS — R53.83 FATIGUE, UNSPECIFIED TYPE: ICD-10-CM

## 2019-06-04 DIAGNOSIS — R35.1 NOCTURIA: ICD-10-CM

## 2019-06-04 LAB
BILIRUB SERPL-MCNC: NORMAL MG/DL
BLOOD URINE, POC: NORMAL
COLOR, POC UA: YELLOW
GLUCOSE UR QL STRIP: NORMAL
KETONES UR QL STRIP: NORMAL
LEUKOCYTE ESTERASE URINE, POC: NORMAL
NITRITE, POC UA: NORMAL
PH, POC UA: 7
PROTEIN, POC: NORMAL
SPECIFIC GRAVITY, POC UA: 1.01
UROBILINOGEN, POC UA: NORMAL

## 2019-06-04 PROCEDURE — 81002 URINALYSIS NONAUTO W/O SCOPE: CPT | Mod: S$GLB,,, | Performed by: UROLOGY

## 2019-06-04 PROCEDURE — 99214 PR OFFICE/OUTPT VISIT, EST, LEVL IV, 30-39 MIN: ICD-10-PCS | Mod: 25,S$GLB,, | Performed by: UROLOGY

## 2019-06-04 PROCEDURE — 81002 POCT URINE DIPSTICK WITHOUT MICROSCOPE: ICD-10-PCS | Mod: S$GLB,,, | Performed by: UROLOGY

## 2019-06-04 PROCEDURE — 99214 OFFICE O/P EST MOD 30 MIN: CPT | Mod: 25,S$GLB,, | Performed by: UROLOGY

## 2019-06-04 PROCEDURE — 1101F PT FALLS ASSESS-DOCD LE1/YR: CPT | Mod: CPTII,S$GLB,, | Performed by: UROLOGY

## 2019-06-04 PROCEDURE — 99999 PR PBB SHADOW E&M-EST. PATIENT-LVL IV: ICD-10-PCS | Mod: PBBFAC,,, | Performed by: UROLOGY

## 2019-06-04 PROCEDURE — 1101F PR PT FALLS ASSESS DOC 0-1 FALLS W/OUT INJ PAST YR: ICD-10-PCS | Mod: CPTII,S$GLB,, | Performed by: UROLOGY

## 2019-06-04 PROCEDURE — 99999 PR PBB SHADOW E&M-EST. PATIENT-LVL IV: CPT | Mod: PBBFAC,,, | Performed by: UROLOGY

## 2019-06-04 RX ORDER — TAMSULOSIN HYDROCHLORIDE 0.4 MG/1
CAPSULE ORAL
Refills: 1 | COMMUNITY
Start: 2019-03-10 | End: 2019-06-04 | Stop reason: ALTCHOICE

## 2019-06-04 RX ORDER — TAMSULOSIN HYDROCHLORIDE 0.4 MG/1
0.4 CAPSULE ORAL DAILY
Qty: 90 CAPSULE | Refills: 3 | Status: SHIPPED | OUTPATIENT
Start: 2019-06-04 | End: 2019-09-10 | Stop reason: SDUPTHER

## 2019-06-04 NOTE — PATIENT INSTRUCTIONS
Prostate Cancer, rO7wRw2+3, Intermediate Risk, BPH with increasing weakness of stream, Nocturia  · Int dz- 6 months adt only, also c/o fatigue. No further ADT recommended  · psa every 3 months for 3 years then psa every 6 months for 2years then yearly after this  · T slowly rising.   · bc his psa is slowly rising will order psa in 3 months and f/u after  · Discussed adding T to check for fatigue however did explain that with rising psa would not treat with T. He's also c/o intermittent diarrhea and constipation, could be related to radation, could be separate issue. Explained that he needs to see a pcp to ensure that they work up his fatigue and this scan include T. He will have labs drawn by pcp once he establishes care. -nocturia could be related to LE edema from daytime or sleep apnea undiagnosed (denies snoring). Also could be causing fatigue. Went over multifactorial nature of nocturia for him. If It becomes bothersome can refer to pulmonology.      Gross hematuria, RMP stone  -workup was negative except for right renal stone. Ctu, cytology, cystoscopy and urine culture all  Negative.     psa in 3 months and f/u after   did offer transferring his care to physician closer to his new home but he's ok with driving here.

## 2019-06-04 NOTE — PROGRESS NOTES
[facility] Note - [place]  Staff: MD Janis    Referring provider and please cc: [place] and   PCP:  (leaving), [facility]    Chief Complaint: elevated psa    Subjective:        HPI: Ramon Reilly is a 78 y.o. male presents with     Prostate cancer, [id number], psa 11.2, Gl 3+3 s/p XRT and ADT x 6 months, bph   -started on flomax 0.4mg by  in January for weak stream, nocturia 3-4x a night which improved with flomax 0.4mg. pvr by bladder scan: 63cc. cysto trus biopsy. trus volume was 55g.  Uroflow results (date: 07/09/2018) on flomax 0.4mg  :Average flowrate: 8.0mL/s,   Voided volume: 163 mL,  Pvr by bladder scan: 208.  Uroflow results (date: 07/12/2018) on flomax 0.8mg nightly   : Average flowrate: 4.0mL/s, Voided volume: 128 mL,  Pvr by bladder scan: 76ml. I had increased him from 0.4 to 0.8 for persistent slow stream but his sx improved and he was dropped back down to 0.4. Good urine stream. Nocturia unchanged. Stopped at last visit on 12/4/18.   -also found to have an elevated psa 9.7 1/30/18. LIZZIE revealed nodular prostate and referred to  who saw him on 3/28/18 and ordered an MRI. Seen by me for the first time 5/29/18 to discuss MRI and a LIZZIE by me revealed a 1cm prostate nodule. A  ctu done for GH 6/1/18 multiple serosal liver lesions and anterior abdominal wall lesions and 5mm non-obstructing rmp stone. Cysto trus biopsy on 6/4/18 showed 55g prostate and 10/17 cores + for Gl 3+3, all on right. After serosal lesion workup negative proceeded with prostate cancer radiation treatment. See below for psa history and treatment    Saw  5/13/19. C/o low energy, no GH. Ordered T but not done. Nocturia 2-3x a night still. Moved to [place], hasn't established pcp. Nocturia 3-4x a night. He says he stopped the flomax after last visit but noticed a considerable decrease in stream and restarted it.     psa risen from  0.04 to 0.10.     5/31/19 0.10  3/4/19  0.04  11/28/18 0.09  10/19/18 0.2  8/18-10/18 radiotherapy to 7740 cGy ending October 2018.  7/30/18 Gold markers  7/10/18 trelstar 22.5mg, on vit D and calcium   6/4/18  trus bx: Gl 3+3, 10/17 cores + for Gl 3+3, all on right  5/29/18 11.2, LIZZIE: 1cm right sided prostate nodule   4/25/18 MRI: Pirads 4 posteriormedial to PZ, midgland to apex on the right   1/30/18 9.7    Serosal and abdominal wall implants  -scheduled him with  who did a colonscopy and did not find any colon mass. PET Ct revealed michael-hepatic masses.  Eventually underwent biopsy on 7/31/18 which revealed granulation tissue. His f/u PET CT was negative after completion of radiation for his prostate cancer.     Gross hematuria, Right mid pole stone  -had chest pain 3/4/18, found to have cholecystitis and was referred for cholecystectomy.  UA at ER was negative that day  but when he went home he saw pink urine for 2 days. No uti symptoms. F/u ua on 3/19/18 negative.on no blood thinners except for asa, no h/o stones, former smoker (30 pack year history). No stds in the past. workup has been negative. ctu 6/1/18 multiple serosal liver lesions and anterior abdominal wall lesions and 5mm non-obstructing rmp stone. 5/29/18 cytology and culture negative. cysto 6/4/18 negative    UA today: neg  Urine history:  12/4/18 Clinic poct: tr protein/tr blood, aut: neg  7/26/18 Ng, void: neg  5/29/18 Ng, void: neg, cytology: negative  3/19/18 No cx, void: neg  3/4/18  No cx, void: neg    Vasectomy: no    REVIEW OF SYSTEMS:  General ROS: no fevers, no chills  Psychological ROS: no depression  Endocrine ROS: no heat or cold  Respiratory ROS: no SOB  Cardiovascular ROS: no CP  Gastrointestinal ROS: no abdominal pain, no constipation, no diarrhea, no BRBPR  Musculoskeletal ROS: no muscle pain  Neurological ROS: no headaches  Dermatological ROS: no rashes  HEENT: +glasses, no sinus   ROS: per HPI     PMHx:  Past Medical  History:   Diagnosis Date    BPH (benign prostatic hyperplasia)     Cancer     prostate    Wears glasses      Kidney stones: No  Cataracts? Beginning      PSHx:  Past Surgical History:   Procedure Laterality Date    BIOPSY, ABDOMINAL WALL N/A 2018    [facility]    CHOLECYSTECTOMY      CHOLECYSTECTOMY-LAPAROSCOPIC N/A 3/12/2018    [facility] at [facility] OR    COLONOSCOPY N/A 2018    [facility] ENDO    CYSTOSCOPY N/A 2018    [facility]    EYE SURGERY Right     eye lid surgery     GOLD MARKERS- N/A 2018    [facility] OR    HERNIA REPAIR  1990    LAPAROSCOPY, DIAGNOSTIC N/A 2018    [facility] OR    TONSILLECTOMY, ADENOIDECTOMY      TRANSRECTAL ULTRASOUND BIOPSY N/A 2018    [facility]    WISDOM TOOTH EXTRACTION     umbilical hernia repair with recurrence    Stents/Valves/Foreign Bodies: Yes   Cardiac Evaluation: No    Screening Studies  Colonoscopy: none     Fam Hx:   malignancies: No  . Gyn malignancies: mother had breast cancer. Mother  a 72 of breast cancer. Father  a 77 of MI  kidney stones: No     Soc Hx:  Former tobacco, quit .  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    Urologic Medications:flomax 0.4mg nightly  Anticoagulation: Yes - asa 81mg daily     Objective:     Vitals:    19 1454   BP: 133/74   Pulse: 63   Temp: 98.1 °F (36.7 °C)       General:WDWN in NAD  Eyes: PERRLA, normal conjunctiva  Respiratory: No increased work on breathing.   Cardiovascular: No obvious extremity edema. Warm and well perfused.   GI: palpation of masses. No  tenderness. No hepatosplenomegaly to palpation.  Musculoskeletal: normal range of motion of bilateral upper extremities. Normal muscle strength and tone.  Skin: no obvious rashes or lesions. No tightening of skin noted.  Neurologic: CN grossly normal. Normal sensation.   Psychiatric: awake, alert and oriented x 3. Mood and affect normal. Cooperative.     5/2018:  Inspection of anus normal  No scrotal rashes, cysts or lesions  Epididymis normal in size, no tenderness  Testes normal and size, equal size bilaterally, no masses  Urethral meatus normal without discharge  Penis is circumcised,    LIZZIE: 40g gland 1cm prostate nodule right side mid gland towards apex, tenderness. SV not palpable. Normal sphincter tone. No hemhorroids.  No bilateral inguinal hernias noted       LABS REVIEW:  BMP  Lab Results   Component Value Date     10/29/2018    K 4.8 10/29/2018     10/29/2018    CO2 29 10/29/2018    BUN 19 10/29/2018    CREATININE 0.9 10/29/2018    CALCIUM 9.6 10/29/2018    ANIONGAP 7 (L) 10/29/2018    ESTGFRAFRICA >60 10/29/2018    EGFRNONAA >60 10/29/2018     Lab Results   Component Value Date    WBC 5.50 10/29/2018    HGB 12.5 (L) 10/29/2018    HCT 37.0 (L) 10/29/2018    MCV 90 10/29/2018     10/29/2018       PATHOLOGY REVIEW:   PERITONEAL MASS, RIGHT UPPER QUADRANT, BIOPSIES 7/31/18:  -Focal benign liver parenchyma with acute and chronic inflammation, surrounding granulation tissue with marked  acute and chronic inflammation and associated fibrosis (see comment).  -No evidence of malignancy.  COMMENT: This patient underwent a cholecystectomy on 3-. The histopathologic findings seen in this  specimen are consistent with an inflamed surgical site at the liver bed. An infectious process cannot be excluded.  Close clinical follow-up and clinical correlation are recommended.    trus biopsy 6/4/18:   PROSTATE BIOPSIES 5., 6., 7., 8., 11., 12., AND 13.:  NO CARCINOMA IDENTIFIED  BIOPSIES OF 1.  RIGHT LATERAL BASE, 2. RIGHT BASE MEDIAL, 3. RIGHT MID LATERAL, 4. RIGHT MID  MEDIAL, 9. RIGHT APEX MEDIAL, 10. RIGHT APEX LATERAL, 14. RIGHT TRANSITION ZONE, AND 15.  RIGHT APICAL NODULE: ADENOCARCINOMA TOTAL MORRIS SCORE 6 (3+ 3)    Adenocarcinoma involves 30% of specimen 1, 65% of 2, 60% of 3, 60% of 4 (with involvement of both cores), 60% of 9 (involvement of all 3 cores), 80% of 10, 15% of 14, and 75% of 15. The pattern is that of Morris grade 3. The verysmall tissue fragments grossly described are interpreted as being adjacent connective tissue, as opposed to reflectingadditional core biopsies.    Urine cytology 5/31/18: Negative for malignant cells.    RADIOGRAPHIC REVIEW:  11/2/18 pet for abdominal lesions  No visceromegaly, visceral or peritoneal masses or ascites seen.  There are no abnormal foci of hypermetabolism in the previously seen  perihepatic foci of hypermetabolism has regressed and no longer hypermetabolic.    ctu 6/1/18 done for gross hematuria  5mm RMP stone. No renal masses, no hydro  subcm likely cysts on both kidneys  Prostate enlarged with diffuse bladder wall thickening  Small nodular serosal implants on the live, anterior abdominal wall suspicious for metastatic dz  Questionable transverse colon mass     MRI of the prostate with and without contrast at [facility] (4/25/18). Volume: 63g. PZ: pirads 4 posterior to posteromedial PZ of the midgland to apex on the right with epicenter in the midgland. No gross macrocopic EPe noted but does bulge the capsule.      Assessment:       1. Prostate cancer    2. Nocturia    3. Fatigue, unspecified type    4. Abdominal mass, unspecified abdominal location          Plan:     Prostate Cancer, fM1aBs6+3, Intermediate Risk, BPH with increasing weakness of stream, Nocturia  · Int dz- 6 months adt only, also c/o fatigue. No further ADT recommended  · psa every 3 months for 3 years then psa every 6 months for 2years then yearly after this  · T slowly  rising.   · bc his psa is slowly rising will order psa in 3 months and f/u after  · Discussed adding T to check for fatigue however did explain that with rising psa would not treat with T. He's also c/o intermittent diarrhea and constipation, could be related to radation, could be separate issue. Explained that he needs to see a pcp to ensure that they work up his fatigue and this scan include T. He will have labs drawn by pcp once he establishes care. -nocturia could be related to LE edema from daytime or sleep apnea undiagnosed (denies snoring). Also could be causing fatigue. Went over multifactorial nature of nocturia for him. If It becomes bothersome can refer to pulmonology.     Fatigue and history of serosal implants  -worked up and was fibrosis and negative for malignancy on biopsy but with worsening fatigue will send morning T to confirm not a cause and re-scan to ensure no enlarging masses in abdomen. Had already been discharged by   -again if workup negative (normal T, normal ct) then needs to see pcp. Even if T low do not recommend replacement for now with rising psa   -ctap w wo iv contrast with oral contrast ordered.      Gross hematuria, RMP stone  -workup was negative except for right renal stone. Ctu, cytology, cystoscopy and urine culture all  Negative.     psa in 3 months and f/u after  did offer transferring his care to physician closer to his new home but he's ok with driving here.     Christie Bruce MD

## 2019-06-17 ENCOUNTER — HOSPITAL ENCOUNTER (OUTPATIENT)
Dept: RADIOLOGY | Facility: HOSPITAL | Age: 79
Discharge: HOME | End: 2019-06-17
Attending: UROLOGY
Payer: MEDICARE

## 2019-06-17 DIAGNOSIS — R35.1 NOCTURIA: ICD-10-CM

## 2019-06-17 DIAGNOSIS — C61 PROSTATE CANCER: ICD-10-CM

## 2019-06-17 DIAGNOSIS — R53.83 FATIGUE, UNSPECIFIED TYPE: ICD-10-CM

## 2019-06-17 PROCEDURE — 74178 CT ABDOMEN PELVIS W WO CONTRAST: ICD-10-PCS | Mod: 26,,, | Performed by: RADIOLOGY

## 2019-06-17 PROCEDURE — 74178 CT ABD&PLV WO CNTR FLWD CNTR: CPT | Mod: TC,PO

## 2019-06-17 PROCEDURE — 74178 CT ABD&PLV WO CNTR FLWD CNTR: CPT | Mod: 26,,, | Performed by: RADIOLOGY

## 2019-06-17 PROCEDURE — 25500020 PHARM REV CODE 255: Mod: PO | Performed by: UROLOGY

## 2019-06-17 RX ADMIN — IOHEXOL 30 ML: 350 INJECTION, SOLUTION INTRAVENOUS at 10:06

## 2019-06-17 RX ADMIN — IOHEXOL 100 ML: 350 INJECTION, SOLUTION INTRAVENOUS at 10:06

## 2019-06-18 DIAGNOSIS — N20.0 NEPHROLITHIASIS: Primary | ICD-10-CM

## 2019-07-17 ENCOUNTER — PATIENT OUTREACH (OUTPATIENT)
Dept: ADMINISTRATIVE | Facility: HOSPITAL | Age: 79
End: 2019-07-17

## 2019-07-17 NOTE — PROGRESS NOTES
Health Maintenance Due   Topic Date Due    TETANUS VACCINE  10/17/1958    Shingles Vaccine (1 of 2) 10/17/1989    Pneumococcal Vaccine (65+ High/Highest Risk) (2 of 2 - PCV13) 02/16/2019     Chart review completed 07/17/2019  Future Appointments   Date Time Provider Department Center   7/31/2019 11:00 AM Amadeo Delatorre DO [facility]   9/4/2019  1:00 PM LAB, [facility] LAB [place]   9/4/2019  2:00 PM [facility] XR2 [facility] XRAY [facility]   9/10/2019  1:40 PM Christie Bruce MD [facility] UROLOGY [place]   5/13/2020 10:30 AM [facility] RAD ONC, PHYSICIAN SCHED [facility] RAD ONC [facility] [place]

## 2019-09-04 ENCOUNTER — HOSPITAL ENCOUNTER (OUTPATIENT)
Dept: RADIOLOGY | Facility: HOSPITAL | Age: 79
Discharge: HOME | End: 2019-09-04
Attending: UROLOGY
Payer: MEDICARE

## 2019-09-04 DIAGNOSIS — N20.0 NEPHROLITHIASIS: ICD-10-CM

## 2019-09-04 PROCEDURE — 74018 RADEX ABDOMEN 1 VIEW: CPT | Mod: 26,,, | Performed by: RADIOLOGY

## 2019-09-04 PROCEDURE — 74018 XR ABDOMEN AP 1 VIEW: ICD-10-PCS | Mod: 26,,, | Performed by: RADIOLOGY

## 2019-09-04 PROCEDURE — 74018 RADEX ABDOMEN 1 VIEW: CPT | Mod: TC,FY,PO

## 2019-09-10 ENCOUNTER — OFFICE VISIT (OUTPATIENT)
Dept: UROLOGY | Facility: CLINIC | Age: 79
End: 2019-09-10
Payer: MEDICARE

## 2019-09-10 VITALS
WEIGHT: 243.81 LBS | BODY MASS INDEX: 34.9 KG/M2 | HEART RATE: 55 BPM | DIASTOLIC BLOOD PRESSURE: 74 MMHG | SYSTOLIC BLOOD PRESSURE: 150 MMHG | HEIGHT: 70 IN

## 2019-09-10 DIAGNOSIS — C61 PROSTATE CANCER: Primary | ICD-10-CM

## 2019-09-10 DIAGNOSIS — N20.0 NEPHROLITHIASIS: ICD-10-CM

## 2019-09-10 DIAGNOSIS — N40.0 BENIGN PROSTATIC HYPERPLASIA, UNSPECIFIED WHETHER LOWER URINARY TRACT SYMPTOMS PRESENT: ICD-10-CM

## 2019-09-10 DIAGNOSIS — C61 ADENOCARCINOMA OF PROSTATE: ICD-10-CM

## 2019-09-10 DIAGNOSIS — R31.29 MICROHEMATURIA: ICD-10-CM

## 2019-09-10 PROBLEM — R97.20 ELEVATED PSA: Status: RESOLVED | Noted: 2018-05-29 | Resolved: 2019-09-10

## 2019-09-10 PROBLEM — R93.89 ABNORMAL CT SCAN: Status: RESOLVED | Noted: 2018-06-21 | Resolved: 2019-09-10

## 2019-09-10 LAB
BILIRUB SERPL-MCNC: NORMAL MG/DL
BLOOD URINE, POC: NORMAL
COLOR, POC UA: YELLOW
GLUCOSE UR QL STRIP: NORMAL
KETONES UR QL STRIP: NORMAL
LEUKOCYTE ESTERASE URINE, POC: NORMAL
NITRITE, POC UA: NORMAL
PH, POC UA: 6.5
PROTEIN, POC: NORMAL
SPECIFIC GRAVITY, POC UA: 1.02
UROBILINOGEN, POC UA: NORMAL

## 2019-09-10 PROCEDURE — 81002 URINALYSIS NONAUTO W/O SCOPE: CPT | Mod: S$GLB,,, | Performed by: UROLOGY

## 2019-09-10 PROCEDURE — 81002 POCT URINE DIPSTICK WITHOUT MICROSCOPE: ICD-10-PCS | Mod: S$GLB,,, | Performed by: UROLOGY

## 2019-09-10 PROCEDURE — 99215 OFFICE O/P EST HI 40 MIN: CPT | Mod: 25,S$GLB,, | Performed by: UROLOGY

## 2019-09-10 PROCEDURE — 99999 PR PBB SHADOW E&M-EST. PATIENT-LVL III: ICD-10-PCS | Mod: PBBFAC,,, | Performed by: UROLOGY

## 2019-09-10 PROCEDURE — 99999 PR PBB SHADOW E&M-EST. PATIENT-LVL III: CPT | Mod: PBBFAC,,, | Performed by: UROLOGY

## 2019-09-10 PROCEDURE — 1101F PT FALLS ASSESS-DOCD LE1/YR: CPT | Mod: CPTII,S$GLB,, | Performed by: UROLOGY

## 2019-09-10 PROCEDURE — 1101F PR PT FALLS ASSESS DOC 0-1 FALLS W/OUT INJ PAST YR: ICD-10-PCS | Mod: CPTII,S$GLB,, | Performed by: UROLOGY

## 2019-09-10 PROCEDURE — 99215 PR OFFICE/OUTPT VISIT, EST, LEVL V, 40-54 MIN: ICD-10-PCS | Mod: 25,S$GLB,, | Performed by: UROLOGY

## 2019-09-10 RX ORDER — TAMSULOSIN HYDROCHLORIDE 0.4 MG/1
0.8 CAPSULE ORAL DAILY
Qty: 180 CAPSULE | Refills: 3 | Status: SHIPPED | OUTPATIENT
Start: 2019-09-10 | End: 2020-07-27 | Stop reason: SDUPTHER

## 2019-09-10 NOTE — PROGRESS NOTES
[facility] Note - [place]  Staff: MD Janis    Referring provider and please cc: Polo and   PCP:  (leaving), changing to [facility]    Chief Complaint: prostate cancer    Subjective:        HPI: Ramon Reilly is a 78 y.o. male presents with     Prostate cancer, [id number], psa 11.2, Gl 3+3 s/p XRT and ADT x 6 months, bph   -started on flomax 0.4mg by  in January for weak stream, nocturia 3-4x a night which improved with flomax 0.4mg. pvr by bladder scan: 63cc. cysto trus biopsy. trus volume was 55g.  Uroflow results (date: 07/09/2018) on flomax 0.4mg  :Average flowrate: 8.0mL/s,   Voided volume: 163 mL,  Pvr by bladder scan: 208.  Uroflow results (date: 07/12/2018) on flomax 0.8mg nightly   : Average flowrate: 4.0mL/s, Voided volume: 128 mL,  Pvr by bladder scan: 76ml. I had increased him from 0.4 to 0.8 for persistent slow stream but his sx improved and he was dropped back down to 0.4. Good urine stream. Nocturia unchanged. Stopped at last visit on 12/4/18.   -also found to have an elevated psa 9.7 1/30/18. LIZZIE revealed nodular prostate and referred to  who saw him on 3/28/18 and ordered an MRI. Seen by me for the first time 5/29/18 to discuss MRI and a LIZZIE by me revealed a 1cm prostate nodule. A  ctu done for  6/1/18 multiple serosal liver lesions and anterior abdominal wall lesions and 5mm non-obstructing rmp stone. Cysto trus biopsy on 6/4/18 showed 55g prostate and 10/17 cores + for Gl 3+3, all on right. After serosal lesion workup negative proceeded with prostate cancer radiation treatment. See below for psa history and treatment    Saw  5/13/19, plan was for 1 year f/u .last visit c/o Nocturia 3-4x a night now down to 1x anight. On flomax.fatigue.  Ct ordered which showed serosal implants disappeared and 5mm stone in RMP. psa rising.  Was supposed to see pcp for fatigue but dealing with house stuff since  moving to [place] (able to cut 5 acres). Still fatigued with any activity. No back pain. psa now 0.12    psa history  9/4/19  0.12, pvr by scan: 115cc  5/31/19 0.10, T 6/17/19 336  3/4/19  0.04  11/28/18 0.09  10/19/18 0.2  8/18-10/18 radiotherapy to 7740 cGy ending October 2018.  7/30/18 Gold markers  7/10/18 trelstar 22.5mg, on vit D and calcium   6/4/18  trus bx: Gl 3+3, 10/17 cores + for Gl 3+3, all on right  5/29/18 11.2, LIZZIE: 1cm right sided prostate nodule   4/25/18 MRI: Pirads 4 posteriormedial to PZ, midgland to apex on the right   1/30/18 9.7    UA today: neg  Urine history:  6/4/19  No cx, void: neg  12/4/18 Clinic poct: tr protein/tr blood, aut: neg  7/26/18 Ng, void: neg  5/29/18 Ng, void: neg, cytology: negative  3/19/18 No cx, void: neg  3/4/18  No cx, void: neg      REVIEW OF SYSTEMS:  General ROS: no fevers, no chills  Psychological ROS: no depression  Endocrine ROS: no heat or cold  Respiratory ROS: no SOB  Cardiovascular ROS: no CP  Gastrointestinal ROS: no abdominal pain, no constipation, no diarrhea, no BRBPR  Musculoskeletal ROS: no muscle pain  Neurological ROS: no headaches  Dermatological ROS: no rashes  HEENT: +glasses, no sinus   ROS: per HPI     PMHx:  Past Medical History:   Diagnosis Date    BPH (benign prostatic hyperplasia)     Cancer     prostate    Wears glasses      Kidney stones: No  Cataracts? Beginning      PSHx:  Past Surgical History:   Procedure Laterality Date    BIOPSY, ABDOMINAL WALL N/A 7/31/2018    [facility]    CHOLECYSTECTOMY      CHOLECYSTECTOMY-LAPAROSCOPIC N/A 3/12/2018    [facility]yler [facility]govern [facility] at [facility] OR    COLONOSCOPY N/A 6/21/2018    [facility] at [facility] ENDO    CYSTOSCOPY N/A 6/4/2018    [facility] OR    EYE SURGERY Right     eye lid surgery     GOLD MARKERS- N/A 7/30/2018    [facility] at [facility] OR    HERNIA REPAIR  1990     LAPAROSCOPY, DIAGNOSTIC N/A 2018    [facility]    TONSILLECTOMY, ADENOIDECTOMY      TRANSRECTAL ULTRASOUND BIOPSY N/A 2018    [facility]    WISDOM TOOTH EXTRACTION     umbilical hernia repair with recurrence    Stents/Valves/Foreign Bodies: Yes   Cardiac Evaluation: No    Screening Studies  Colonoscopy: none     Fam Hx:   malignancies: No  . Gyn malignancies: mother had breast cancer. Mother  a 72 of breast cancer. Father  a 77 of MI  kidney stones: No     Soc Hx:  Former tobacco, quit 1980s.  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    Urologic Medications:flomax 0.4mg nightly  Anticoagulation: Yes - asa 81mg daily     Objective:     Vitals:    09/10/19 1344   BP: (!) 150/74   Pulse: (!) 55       General:WDWN in NAD  Eyes: PERRLA, normal conjunctiva  Respiratory: No increased work on breathing.   Cardiovascular: No obvious extremity edema. Warm and well perfused.   GI: palpation of masses. No tenderness. No hepatosplenomegaly to palpation.  Musculoskeletal: normal range of motion of bilateral upper extremities. Normal muscle strength and tone.  Skin: no obvious rashes or lesions. No tightening of skin noted.  Neurologic: CN grossly normal. Normal sensation.   Psychiatric: awake, alert and oriented x 3. Mood and affect normal. Cooperative.     2018:  Inspection of anus normal  No scrotal rashes, cysts or lesions  Epididymis normal in size, no tenderness  Testes normal and size, equal size bilaterally, no masses  Urethral meatus normal without discharge  Penis is circumcised,    LIZZIE: 40g gland 1cm prostate nodule right side mid gland towards apex, tenderness. SV not palpable. Normal sphincter tone. No hemhorroids.  No bilateral  inguinal hernias noted       LABS REVIEW:  [facility]  Lab Results   Component Value Date     10/29/2018    K 4.8 10/29/2018     10/29/2018    CO2 29 10/29/2018    BUN 19 10/29/2018    CREATININE 1.0 06/17/2019    CALCIUM 9.6 10/29/2018    ANIONGAP 7 (L) 10/29/2018    ESTGFRAFRICA >60 06/17/2019    EGFRNONAA >60 06/17/2019     Lab Results   Component Value Date    WBC 5.50 10/29/2018    HGB 12.5 (L) 10/29/2018    HCT 37.0 (L) 10/29/2018    MCV 90 10/29/2018     10/29/2018       PATHOLOGY REVIEW:   PERITONEAL MASS, RIGHT UPPER QUADRANT, BIOPSIES 7/31/18:  -Focal benign liver parenchyma with acute and chronic inflammation, surrounding granulation tissue with marked  acute and chronic inflammation and associated fibrosis (see comment).  -No evidence of malignancy.  COMMENT: This patient underwent a cholecystectomy on 3-. The histopathologic findings seen in this  specimen are consistent with an inflamed surgical site at the liver bed. An infectious process cannot be excluded.  Close clinical follow-up and clinical correlation are recommended.    trus biopsy 6/4/18:   PROSTATE BIOPSIES 5., 6., 7., 8., 11., 12., AND 13.:  NO CARCINOMA IDENTIFIED  BIOPSIES OF 1. RIGHT LATERAL BASE, 2. RIGHT BASE MEDIAL, 3. RIGHT MID LATERAL, 4. RIGHT MID  MEDIAL, 9. RIGHT APEX MEDIAL, 10. RIGHT APEX LATERAL, 14. RIGHT TRANSITION ZONE, AND 15.  RIGHT APICAL NODULE: ADENOCARCINOMA TOTAL MORRIS SCORE 6 (3+ 3)    Adenocarcinoma involves 30% of specimen 1, 65% of 2, 60% of 3, 60% of 4 (with involvement of both cores), 60% of 9 (involvement of all 3 cores), 80% of 10, 15% of 14, and 75% of 15. The pattern is that of Morris grade 3. The verysmall tissue fragments grossly described are interpreted as being adjacent connective tissue, as opposed to reflectingadditional core biopsies.    Urine cytology 5/31/18: Negative for malignant cells.    RADIOGRAPHIC REVIEW:  kub 9/4/19  Nonobstructive bowel gas pattern.  Moderate degree  of stool in the colon as can be seen with constipation.  Cholecystectomy clips.  5 mm calcification right mid abdomen L2 level.  Multilevel spinal degenerative changes.  Dextroscoliosis lumbar spine.  Several metallic markers along the midline pelvis likely within the prostate    ctap 6/17/19  Peritoneal metastatic implants along the serosal surface of the right lateral aspect of the liver which were visible on the previous study of are no longer present.  No new peritoneal implants are evident on today's examination.  The liver, spleen, pancreas, and adrenal glands are normal in size and appearance.  A 5 mm calculus is again noted in the mid right kidney which is unchanged.  The kidneys are otherwise unremarkable.  The gallbladder is surgically absent and the bile ducts are not dilated.  No lymph node enlargement, ascites, or inflammatory changes are evident in the abdomen or pelvis.  Metallic markers have been placed at the prostate gland.  The seminal vesicles are unremarkable.  The bladder is unremarkable.  A small hiatal hernia is again noted.     11/2/18 pet for abdominal lesions  No visceromegaly, visceral or peritoneal masses or ascites seen.  There are no abnormal foci of hypermetabolism in the previously seen  perihepatic foci of hypermetabolism has regressed and no longer hypermetabolic.    ctu 6/1/18 done for gross hematuria  5mm RMP stone. No renal masses, no hydro  subcm likely cysts on both kidneys  Prostate enlarged with diffuse bladder wall thickening  Small nodular serosal implants on the live, anterior abdominal wall suspicious for metastatic dz  Questionable transverse colon mass     MRI of the prostate with and without contrast at [facility] (4/25/18). Volume: 63g. PZ: pirads 4 posterior to posteromedial PZ of the midgland to apex on the right with epicenter in the midgland. No gross macrocopic EPe noted but does bulge the capsule.      Assessment:       1. Prostate cancer    2. Nephrolithiasis     3. Microhematuria    4. Adenocarcinoma of prostate    5. Benign prostatic hyperplasia, unspecified whether lower urinary tract symptoms present          Plan:     Prostate Cancer, [id number]+3, Intermediate Risk, BPH with increasing weakness of stream, Nocturia  -for bph with pvr 115. Increase flomax to 0.8mg nightly   -PSA velocity concerning. Has tripled in 6 months from 0.04 to 0.12.   -failure is described as 3 rises in psa or >2 increase. Has risen twice.   -imaging (bone scan and ct scan to look for metastasis) would likely not help with such a low psa (low sensitivity)  -biopsy not indicated   -could consider finasteride ? Known 55g prostate, residual tissue could be causing rise in psa but unlikley   -discussed restarting ADT but pt chronically fatigued and wants to hold off  -repeat psa in 3 months and if rising, consider starting ADT - pt needs to see pcp before then.     Plan:  F/u in 3 months with psa prior  Increase flomax to 0.8mg    Route      Discussed with :  Agree no scans or bx at this time.   This could still be reconstitution s/p ADT and RT.   He sharmila from 0.04 to 0.10 in 3mos, but from 0.10 to 0.12 in next 3mos so velocity is slowing; I'd watch with q3m PSA for now. I expect it to level off.        Christie Bruce MD

## 2019-09-10 NOTE — Clinical Note
psa tripled in 6 months from 0.04 to 0.12. Says he is fatigued. Would like to start adt but fatigued all the time. dont' think bone scan or ct would be helpful with psa this low. Could just watch. No real answer to when to start adt. No biopsy indicated.

## 2019-09-10 NOTE — PATIENT INSTRUCTIONS
Prostate Cancer, [id number]+3, Intermediate Risk, BPH with increasing weakness of stream, Nocturia  -for bph with pvr 115. Increase flomax to 0.8mg nightly   -PSA velocity concerning. Has tripled in 6 months from 0.04 to 0.12.   -failure is described as 3 rises in psa or >2 increase. Has risen twice.   -imaging (bone scan and ct scan to look for metastasis) would likely not help with such a low psa (low sensitivity)  -biopsy not indicated   -could consider finasteride ? Known 55g prostate, residual tissue could be causing rise in psa but unlikley   -discussed restarting ADT but pt chronically fatigued and wants to hold off  -repeat psa in 3 months and if rising, consider starting ADT - pt needs to see pcp before then.     Route

## 2019-09-18 ENCOUNTER — OFFICE VISIT (OUTPATIENT)
Dept: FAMILY MEDICINE | Facility: CLINIC | Age: 79
End: 2019-09-18
Payer: MEDICARE

## 2019-09-18 ENCOUNTER — HOSPITAL ENCOUNTER (OUTPATIENT)
Dept: RADIOLOGY | Facility: HOSPITAL | Age: 79
Discharge: HOME | End: 2019-09-18
Attending: INTERNAL MEDICINE
Payer: MEDICARE

## 2019-09-18 VITALS
OXYGEN SATURATION: 99 % | DIASTOLIC BLOOD PRESSURE: 80 MMHG | WEIGHT: 248.44 LBS | HEART RATE: 55 BPM | HEIGHT: 70 IN | SYSTOLIC BLOOD PRESSURE: 136 MMHG | BODY MASS INDEX: 35.57 KG/M2

## 2019-09-18 DIAGNOSIS — R53.83 FATIGUE, UNSPECIFIED TYPE: ICD-10-CM

## 2019-09-18 DIAGNOSIS — M25.562 ACUTE PAIN OF LEFT KNEE: ICD-10-CM

## 2019-09-18 DIAGNOSIS — Z13.220 SCREENING FOR LIPID DISORDERS: Primary | ICD-10-CM

## 2019-09-18 DIAGNOSIS — D64.9 NORMOCYTIC ANEMIA: ICD-10-CM

## 2019-09-18 DIAGNOSIS — G62.9 NEUROPATHY: ICD-10-CM

## 2019-09-18 DIAGNOSIS — Z00.00 ROUTINE ADULT HEALTH MAINTENANCE: ICD-10-CM

## 2019-09-18 PROCEDURE — G0008 FLU VACCINE - HIGH DOSE (65+) PRESERVATIVE FREE IM: ICD-10-PCS | Mod: S$GLB,,, | Performed by: INTERNAL MEDICINE

## 2019-09-18 PROCEDURE — 99999 PR PBB SHADOW E&M-EST. PATIENT-LVL IV: ICD-10-PCS | Mod: PBBFAC,,, | Performed by: INTERNAL MEDICINE

## 2019-09-18 PROCEDURE — G0008 ADMIN INFLUENZA VIRUS VAC: HCPCS | Mod: S$GLB,,, | Performed by: INTERNAL MEDICINE

## 2019-09-18 PROCEDURE — 73562 XR KNEE ORTHO LEFT: ICD-10-PCS | Mod: 26,LT,, | Performed by: RADIOLOGY

## 2019-09-18 PROCEDURE — 90662 IIV NO PRSV INCREASED AG IM: CPT | Mod: S$GLB,,, | Performed by: INTERNAL MEDICINE

## 2019-09-18 PROCEDURE — 90662 FLU VACCINE - HIGH DOSE (65+) PRESERVATIVE FREE IM: ICD-10-PCS | Mod: S$GLB,,, | Performed by: INTERNAL MEDICINE

## 2019-09-18 PROCEDURE — 99214 OFFICE O/P EST MOD 30 MIN: CPT | Mod: 25,S$GLB,, | Performed by: INTERNAL MEDICINE

## 2019-09-18 PROCEDURE — 73562 X-RAY EXAM OF KNEE 3: CPT | Mod: TC,FY,PO,LT

## 2019-09-18 PROCEDURE — 73560 X-RAY EXAM OF KNEE 1 OR 2: CPT | Mod: 26,59,RT, | Performed by: RADIOLOGY

## 2019-09-18 PROCEDURE — 1101F PT FALLS ASSESS-DOCD LE1/YR: CPT | Mod: CPTII,S$GLB,, | Performed by: INTERNAL MEDICINE

## 2019-09-18 PROCEDURE — 73560 X-RAY EXAM OF KNEE 1 OR 2: CPT | Mod: TC,FY,PO,RT

## 2019-09-18 PROCEDURE — 99999 PR PBB SHADOW E&M-EST. PATIENT-LVL IV: CPT | Mod: PBBFAC,,, | Performed by: INTERNAL MEDICINE

## 2019-09-18 PROCEDURE — 73560 XR KNEE ORTHO LEFT: ICD-10-PCS | Mod: 26,59,RT, | Performed by: RADIOLOGY

## 2019-09-18 PROCEDURE — 73562 X-RAY EXAM OF KNEE 3: CPT | Mod: 26,LT,, | Performed by: RADIOLOGY

## 2019-09-18 PROCEDURE — 99214 PR OFFICE/OUTPT VISIT, EST, LEVL IV, 30-39 MIN: ICD-10-PCS | Mod: 25,S$GLB,, | Performed by: INTERNAL MEDICINE

## 2019-09-18 PROCEDURE — 1101F PR PT FALLS ASSESS DOC 0-1 FALLS W/OUT INJ PAST YR: ICD-10-PCS | Mod: CPTII,S$GLB,, | Performed by: INTERNAL MEDICINE

## 2019-09-18 NOTE — PATIENT INSTRUCTIONS
Start taking colace at night  doculax during the day  Can add senna or miralax as well   Continue increasing fiber intake.

## 2019-09-18 NOTE — PROGRESS NOTES
Patient ID: Ramon Reilly is a 78 y.o. male.    Chief Complaint: Establish Care    HPI  From [facility]. Now living in [place] area. . Has one step son. Retired from [employer] de[employer] and [email or web address].     Prostate cancer cT2b jodi 3+3 status post XRT and ADT.  Was seen by Dr. Bruce on 09/10/2019.  PSA velocity concerning per uro.  Will be rechecking in 3 months and considering repeat ADT.  Was wanting to wait on repeating ADT.  Had liver lesions that were biopsied and reportedly negative.  Peritoneal masses disappeared on repeat CT. He has been having fatigue and low energy, constipation and times, and nausea every now and then. He states he can only walk about 1/4 mile and feels fatigued after that. His BMI is 35. Has lost weight since 2018. He also has left knee pain. Was scheduled to do PT. No popping or locking. Goal is to continue weight loss and increase exercise.   -knee xray and ortho consult for potential injection  -Will reorder PT.     Last CMP done in 10/29/2018 was normal.  CBC shows normocytic anemia.     PMH:   Past Medical History:   Diagnosis Date    BPH (benign prostatic hyperplasia)     Cancer     prostate    Wears glasses      Surg Hx:   Past Surgical History:   Procedure Laterality Date    BIOPSY, ABDOMINAL WALL N/A 7/31/2018    [facility]    CHOLECYSTECTOMY      CHOLECYSTECTOMY-LAPAROSCOPIC N/A 3/12/2018    [facility] Skyler [facility]govern [facility] at [facility] OR    COLONOSCOPY N/A 6/21/2018    [facility] at [facility] ENDO    CYSTOSCOPY N/A 6/4/2018    [facility] OR    EYE SURGERY Right     eye lid surgery     GOLD MARKERS- N/A 7/30/2018    [facility]formed [facility] at [facility] OR    HERNIA REPAIR  1990    LAPAROSCOPY, DIAGNOSTIC N/A 7/31/2018    [facility]    TONSILLECTOMY, ADENOIDECTOMY      TRANSRECTAL ULTRASOUND BIOPSY N/A 6/4/2018    [facility]    WISDOM TOOTH EXTRACTION       Fhx:   Family History   Problem Relation Age of Onset    Eczema Neg Hx     Lupus Neg Hx     Psoriasis Neg Hx     Melanoma Neg Hx      Social Hx:   Social History     Socioeconomic History    Marital status:      Spouse name: Not on file    Number of children: Not on file    Years of education: Not on file    Highest education level: Not on file   Occupational History    Not on file   Social Needs    Financial resource strain: Not hard at all    Food insecurity:     Worry: Never true     Inability: Never true    Transportation needs:     Medical: No     Non-medical: No   Tobacco Use    Smoking status: Former Smoker     Last attempt to quit: 3/12/1988     Years since quittin.5    Smokeless tobacco: Never Used   Substance and Sexual Activity    Alcohol use: Yes     Frequency: Never     Binge frequency: Never     Comment: social, at weddings    Drug use: No    Sexual activity: Never   Lifestyle    Physical activity:     Days per week: 3 days     Minutes per session: 10 min    Stress: Only a little   Relationships    Social connections:     Talks on phone: Three times a week     Gets together: Once a week     Attends [employer] service: Not on file     Active member of club or organization: Yes     Attends meetings of clubs or organizations: 1 to 4 times per year     Relationship status:    Other Topics Concern    Not on file   Social History Narrative    Not on file       Here today for        Current Outpatient Medications on File Prior to Visit   Medication Sig Dispense Refill    aspirin (ECOTRIN) 81 MG EC tablet Take 81 mg by mouth once daily.      calcium carbonate (OS-IDA) 500 mg calcium (1,250 mg) tablet Take 1 tablet (500 mg total) by mouth once daily. 30 tablet 12    cholecalciferol, vitamin D3, (VITAMIN D3) 1,000 unit capsule Take 1,000 Units by mouth once daily.      multivitamin capsule Take 1  capsule by mouth once daily.      tamsulosin (FLOMAX) 0.4 mg Cap Take 2 capsules (0.8 mg total) by mouth once daily. Take at bedtime 180 capsule 3     No current facility-administered medications on file prior to visit.      I personally reviewed past medical, family and social history.  Review of Systems   Constitutional: Negative for activity change and unexpected weight change.   HENT: Negative for hearing loss, rhinorrhea and trouble swallowing.    Eyes: Negative for discharge and visual disturbance.   Respiratory: Negative for chest tightness and wheezing.    Cardiovascular: Negative for chest pain and palpitations.   Gastrointestinal: Positive for constipation. Negative for blood in stool, diarrhea and vomiting.   Endocrine: Negative for polydipsia and polyuria.   Genitourinary: Negative for difficulty urinating, hematuria and urgency.   Musculoskeletal: Positive for arthralgias. Negative for joint swelling and neck pain.   Neurological: Positive for weakness. Negative for headaches.   Psychiatric/Behavioral: Negative for confusion and dysphoric mood.       Objective:     Vitals:    09/18/19 1300   BP: 136/80   Pulse: (!) 55        Physical Exam   Constitutional: He is oriented to person, place, and time. He appears well-developed and well-nourished. No distress.   HENT:   Head: Normocephalic and atraumatic.   Eyes: Pupils are equal, round, and reactive to light. EOM are normal. Right eye exhibits no discharge. Left eye exhibits no discharge. No scleral icterus.   Neck: Normal range of motion. Neck supple. No JVD present. No thyromegaly present.   Cardiovascular: Normal rate, regular rhythm and normal heart sounds. Exam reveals no gallop and no friction rub.   No murmur heard.  Mild BLE pitting edema   Pulmonary/Chest: Effort normal and breath sounds normal. No respiratory distress. He has no wheezes.   Abdominal: Soft. Bowel sounds are normal. He exhibits no distension and no mass. There is no tenderness.    Hernia, reducible. Surgical scar   Musculoskeletal: Normal range of motion. He exhibits no edema.   Lymphadenopathy:     He has no cervical adenopathy.   Neurological: He is alert and oriented to person, place, and time. No cranial nerve deficit. Coordination normal.   Skin: Skin is warm and dry. Capillary refill takes less than 2 seconds. No rash noted. He is not diaphoretic.   Psychiatric: He has a normal mood and affect. His behavior is normal.         Assessment/Plan   Ramon was seen today for establish care.    Diagnoses and all orders for this visit:    Screening for lipid disorders  -     Lipid panel; Future    Normocytic anemia  -     CBC auto differential; Future    Routine adult health maintenance  -     Comprehensive metabolic panel; Future    Neuropathy  -     Vitamin B12; Future    Fatigue, unspecified type  -     TSH; Future    Acute pain of left knee  -     X-ray Knee Ortho Left; Future  -     Ambulatory referral/consult to Orthopedics; Future  -     Ambulatory Referral to Physical/Occupational Therapy        Follow up in about 3 months (around 12/18/2019) for check up .

## 2019-09-19 ENCOUNTER — TELEPHONE (OUTPATIENT)
Dept: ORTHOPEDICS | Facility: CLINIC | Age: 79
End: 2019-09-19

## 2019-09-19 ENCOUNTER — CLINICAL SUPPORT (OUTPATIENT)
Dept: REHABILITATION | Facility: HOSPITAL | Age: 79
End: 2019-09-19
Attending: INTERNAL MEDICINE
Payer: MEDICARE

## 2019-09-19 DIAGNOSIS — R26.81 GAIT INSTABILITY: ICD-10-CM

## 2019-09-19 DIAGNOSIS — G89.29 CHRONIC PAIN OF LEFT KNEE: ICD-10-CM

## 2019-09-19 DIAGNOSIS — M25.562 CHRONIC PAIN OF LEFT KNEE: ICD-10-CM

## 2019-09-19 PROCEDURE — 97161 PT EVAL LOW COMPLEX 20 MIN: CPT | Mod: PO

## 2019-09-19 PROCEDURE — 97110 THERAPEUTIC EXERCISES: CPT | Mod: PO

## 2019-09-19 NOTE — PLAN OF CARE
"[facility]  Physical Therapy Initial Evaluation    Name: Ramon Reilly  Clinic Number: [id number]    Therapy Diagnosis:   Encounter Diagnoses   Name Primary?    Chronic pain of left knee     Gait instability      Physician: Amadeo Delatorre DO    Physician Orders: PT Eval and Treat  Medical Diagnosis from Referral: Acute pain of left knee   Evaluation Date: 9/19/2019  Authorization Period Expiration: 12/31/2019   Plan of Care Expiration: 11/15/2019  Visit # / Visits authorized: 1/ 20    Time In: 1000  Time Out: 1055  Total Billable Time: 55 minutes    Precautions: Standard and cancer    Subjective   Date of onset: A few years ago, went to [place] about 3 weeks ago   History of current condition - Ramon reports: He reports his knee has been hurting for a few years. He reports "every few years I will twist it and sprain it." He reports he used to live at a home with a few flights of stairs that exacerbated his pain, but he has moved from that home to a home with no stairs. He reports his most recent increase in pain occurred after he went on vacation 3 weeks ago to [place], which involved a lot of walking and stair climbing. Climbing stairs and stepping onto his zero turn lawnmower increase his pain. He reports his pain increases with standing and walking. No history of falls.      Medical History:   Past Medical History:   Diagnosis Date    BPH (benign prostatic hyperplasia)     Cancer     prostate    Wears glasses        Surgical History:   Ramon Reilly  has a past surgical history that includes Hernia repair (1990); [place] tooth extraction; TONSILLECTOMY, ADENOIDECTOMY; Cholecystectomy; Transrectal biopsy of prostate with ultrasound guidance (N/A, 6/4/2018); Cystoscopy (N/A, 6/4/2018); Colonoscopy (N/A, 6/21/2018); Eye surgery (Right); Transrectal ultrasound examination (N/A, 7/30/2018); Diagnostic laparoscopy (N/A, 7/31/2018); and Biopsy of abdominal wall (N/A, " 7/31/2018).    Medications:   Ramon has a current medication list which includes the following prescription(s): aspirin, calcium carbonate, cholecalciferol (vitamin d3), multivitamin, and tamsulosin.    Allergies:   Review of patient's allergies indicates:  No Known Allergies     Imaging, X-ray: The bones are osteopenic.  There is moderate-severe bilateral medial tibiofemoral joint space narrowing.  There is tricompartmental osteophyte formation in both knees.  There is right lateral patellar tilt and mild right lateral patellofemoral joint space narrowing.  No acute fracture or osseous destructive process.  There is left quadriceps tendon insertion patellar enthesophyte.  No left knee joint effusion.     Prior Therapy: None  Social History: Lives with family, no stairs, one step to get into carport   Occupation: Retired, spends time walking when he can  Prior Level of Function: Mild knee pain, which stopped when he moved to [place] in January  Current Level of Function: Currently has pain with ambulation and ascending stairs    Pain:  Current 0/10, worst 5/10, best 0/10   Location: left knee   Description: Aching and Dull  Aggravating Factors: Standing, Walking and Ascending stairs   Easing Factors: relaxation and rest    Pts goals: To improve his endurance     Objective     Observation: Patient in no acute distress    Posture: Stands with mild genu-varum     Range of Motion:   Knee Left active Left Passive Right Active R passive   Flexion 118 120 126 NT   Extension +4 0 0  0       Lower Extremity Strength  Right LE  Left LE    Knee extension: 5/5 Knee extension: 4+/5   Knee flexion: 4+/5 Knee flexion: 4/5   Hip flexion: 4+/5 Hip flexion: 4/5   Hip extension:  4-/5 Hip extension: 4-/5   Hip abduction: 4/5 Hip abduction: 4-/5   Ankle dorsiflexion: 5/5 Ankle dorsiflexion: 5/5   Ankle plantarflexion: 5/5 Ankle plantarflexion: 5/5     Special Tests:   Left Right   Valgus Stress Test - -   Varus Stress test - -    Lachman's test - -   Posterior Lachman - -   Ángel's Test - -   Apley's Compression - -   Apley's Distraction - -       Function:    - SLS R: 6s with significant sway noted  - SLS L: 2s with significant sway  - Squat: Dysfunctional nonpainful, increased knee flexion  - Sit <--> Stand: Independent     Joint Mobility: Mild (L) Patellar Hypomobiltiy    Palpation: Mild pain at medial joint line     Sensation: Intact to LT L3-S2 dermatomes. Reports numbness and pressure sensation across plantar surface of (L) foot     Flexibility:    Ely's test: R = Moderately limited ; L = Severely limited   Hamstring: R = Mildly limited; L = Mildly limited      CMS Impairment/Limitation/Restriction for FOTO Knee Survey    Therapist reviewed FOTO scores for Ramon Reilly on 9/19/2019.   FOTO documents entered into [employer] - see Media section.    Limitation Score: 49%  Category: Mobility         TREATMENT   Treatment Time In: 1035  Treatment Time Out: 1052  Total Treatment time separate from Evaluation: 17minutes    Ramon received therapeutic exercises to develop strength, endurance, ROM and core stabilization for 17 minutes including:  Quad sets 2 x10 (3 second hold)  Heel slides 2 x 10 (3 second hold)  SLR x 10  Clamshells GTB 2 x 10 each    Home Exercises and Patient Education Provided    Education provided:   - Role of PT, PT POC, HEP    Written Home Exercises Provided: yes.  Exercises were reviewed and Ramon was able to demonstrate them prior to the end of the session.  Ramon demonstrated good  understanding of the education provided.     See EMR under Media for exercises provided 9/19/2019.    Assessment   Ramon is a 78 y.o. male referred to outpatient Physical Therapy with a medical diagnosis of Acute pain of left knee. Physical exam is consistent with (L) chronic left knee pain. Primary impairments include AROM, PROM, joint mobility, strength, balance, soft tissue restrictions, and pain which limits functional mobility.  Mild limitation with knee extension ROM and with glute strengthening (B). Significantly impaired balance (B). This pt is a good candidate for skilled PT tx and stands to benefit from a combination of manual therapy including joint mobilizations with trigger point/myofacscial release, therapeutic exercise to establish core/joint stability, neuromuscular re-education,  and modalities Prn. The pt has been educated on their dx/POC and consents to further PT tx.    Pt prognosis is Good.   Pt will benefit from skilled outpatient Physical Therapy to address the deficits stated above and in the chart below, provide pt/family education, and to maximize pt's level of independence.     Plan of care discussed with patient: Yes  Pt's spiritual, cultural and educational needs considered and patient is agreeable to the plan of care and goals as stated below:     Anticipated Barriers for therapy: None    Medical Necessity is demonstrated by the following  History  Co-morbidities and personal factors that may impact the plan of care Co-morbidities:   History of prostate cancer and osteoporosis     Personal Factors:   age     moderate   Examination  Body Structures and Functions, activity limitations and participation restrictions that may impact the plan of care Body Regions:   lower extremities    Body Systems:    ROM  strength  gross coordinated movement  balance  gait    Participation Restrictions:   Pain with walking, stepping up, using his riding lawnmower    Activity limitations:   Learning and applying knowledge  no deficits    General Tasks and Commands  no deficits    Communication  no deficits    Mobility  walking    Self care  no deficits    Domestic Life  doing house work (cleaning house, washing dishes, laundry)    Interactions/Relationships  no deficits    Life Areas  no deficits    Community and Social Life  recreation and leisure         low   Clinical Presentation stable and uncomplicated low   Decision Making/  Complexity Score: low     Goals:  Short-Term Goals: 3 weeks  - The patient will be independent with initial home exercise program.  - The patient will increase strength to at least 4+/5 to perform functional mobility including walking with pain <2/10  - The patient will increase ROM grossly to 0 to 130 degrees to perform ambulation with pain < 2/10.    Long-Term Goals: 6 weeks  - Pt to achieve <37% limitation as measured by the FOTO to demonstrate decreased disability.  - The patient will be independent with home exercise program and symptom management.  - The patient will be independent amb with no assistive device on all surfaces for community distances.  - The patient will increase strength to at least 5/5 to perform functional mobility including ascending/descending stairs with pain <2/10.  - The patient will perform SLS for >30s to improve his stability and balance with gait.     Plan   Plan of care Certification: 9/19/2019 to 11/15/2019.    Outpatient Physical Therapy 2 times weekly for 12 visits to include the following interventions: Electrical Stimulation prn, Gait Training, Manual Therapy, Moist Heat/ Ice, Neuromuscular Re-ed, Patient Education, Therapeutic Activites and Therapeutic Exercise.     Mp Grady, PT

## 2019-09-19 NOTE — TELEPHONE ENCOUNTER
Called to schedule referral, patient is scheduled for PT and opted not for appointment with Orthopedics.

## 2019-09-24 ENCOUNTER — CLINICAL SUPPORT (OUTPATIENT)
Dept: REHABILITATION | Facility: HOSPITAL | Age: 79
End: 2019-09-24
Attending: INTERNAL MEDICINE
Payer: MEDICARE

## 2019-09-24 DIAGNOSIS — M25.562 CHRONIC PAIN OF LEFT KNEE: ICD-10-CM

## 2019-09-24 DIAGNOSIS — G89.29 CHRONIC PAIN OF LEFT KNEE: ICD-10-CM

## 2019-09-24 DIAGNOSIS — R26.81 GAIT INSTABILITY: ICD-10-CM

## 2019-09-24 PROCEDURE — 97110 THERAPEUTIC EXERCISES: CPT | Mod: PO

## 2019-09-24 NOTE — PROGRESS NOTES
Physical Therapy Daily Treatment Note     Name: Ramon Reilly  [facility] Number: [id number]    Therapy Diagnosis:   Encounter Diagnoses   Name Primary?    Chronic pain of left knee     Gait instability      Physician: Amadeo Delatorre DO    Visit Date: 9/24/2019  Physician Orders: PT Eval and Treat  Medical Diagnosis from Referral: Acute pain of left knee   Evaluation Date: 9/19/2019  Authorization Period Expiration: 12/31/2019   Plan of Care Expiration: 11/15/2019  Visit # / Visits authorized: 2/ 20     Time In: 1100  Time Out:  1150  Total Billable Time: 25 minutes     Precautions: Standard and cancer    Subjective     Pt reports: No complaints.  Per wife, patient was supposed to be set up with an appointment with Orthopedics but they have yet to receive a phone call.  He was compliant with home exercise program.  Did exercises every day except one day.  Response to previous treatment: soreness  Functional change: too soon to tell    Pain: 0/10  Location: left knee     Objective     Ramon received therapeutic exercises to develop strength, endurance, ROM and core stabilization for 45 minutes including:  Quad sets 2 x10 (3 second hold)  Heel slides 2 x 10 (3 second hold)  SLR x 10  Bridges x 10  Clamshells GTB 2 x 10 each  Prone quad stretch with strap 3 x 30 sec  HS stretch with strap 3 x 30 sec    Leg press #40 x 20  Standing hip abduction x 10  Standing hip extension x 10       Ramon received the following manual therapy techniques: Joint mobilizations were applied to the: L knee for 5 minutes, including:  Patella mobs      Home Exercises Provided and Patient Education Provided     Education provided:   - HEP    Written Home Exercises Provided: yes.  Exercises were reviewed and Ramon was able to demonstrate them prior to the end of the session.  Ramon demonstrated good  understanding of the education provided.     See EMR under Patient Instructions for exercises provided 9/24/2019.    Assessment     Pt  tolerated treatment very well with no increase in symptoms.  Will try and progress exercises next treatment.  Ramon is progressing well towards his goals.   Pt prognosis is Good.     Pt will continue to benefit from skilled outpatient physical therapy to address the deficits listed in the problem list box on initial evaluation, provide pt/family education and to maximize pt's level of independence in the home and community environment.     Pt's spiritual, cultural and educational needs considered and pt agreeable to plan of care and goals.    Anticipated barriers to physical therapy: None    Goals:   Short-Term Goals: 3 weeks  - The patient will be independent with initial home exercise program.  - The patient will increase strength to at least 4+/5 to perform functional mobility including walking with pain <2/10  - The patient will increase ROM grossly to 0 to 130 degrees to perform ambulation with pain < 2/10.     Long-Term Goals: 6 weeks  - Pt to achieve <37% limitation as measured by the FOTO to demonstrate decreased disability.  - The patient will be independent with home exercise program and symptom management.  - The patient will be independent amb with no assistive device on all surfaces for community distances.  - The patient will increase strength to at least 5/5 to perform functional mobility including ascending/descending stairs with pain <2/10.  - The patient will perform SLS for >30s to improve his stability and balance with gait.    Plan     Continue with REID Gómez PTA

## 2019-09-24 NOTE — PATIENT INSTRUCTIONS
Bridge        Lie back, legs bent. Inhale, pressing hips up. Keeping ribs in, lengthen lower back. Exhale, rolling down along spine from top.  Repeat _10___ times. Do __1__ sessions per day.     [email or web address]     Copyright © [employer]. All rights reserved.   KNEE: Quadriceps - Prone        Place strap around ankle. Bring ankle toward buttocks. Press hip into surface. Hold _30__ seconds. 2___ reps per set, _1_ sets per day      Copyright © [employer]. All rights reserved.   Hamstring Step 1        Straighten left knee. Keep knee level with other knee or on bolster. Hold _30__ seconds. Relax knee by returning foot to start.  Repeat ___ times.    Copyright © [employer]. All rights reserved.   EXTENSION: Standing (Active)        Stand, both feet flat. Draw right leg behind body.  Complete _1__ sets of _10__ repetitions. Perform _1_ sessions per day.     [email or web address]     Copyright © [employer]. All rights reserved.   ABDUCTION: Standing (Active)        Stand, feet flat. Lift right leg out to side.   Complete _1__ sets of __10_ repetitions. Perform _1__ sessions per day.     [email or web address]     Copyright © [employer]. All rights reserved.

## 2019-09-26 ENCOUNTER — CLINICAL SUPPORT (OUTPATIENT)
Dept: REHABILITATION | Facility: HOSPITAL | Age: 79
End: 2019-09-26
Attending: INTERNAL MEDICINE
Payer: MEDICARE

## 2019-09-26 DIAGNOSIS — G89.29 CHRONIC PAIN OF LEFT KNEE: ICD-10-CM

## 2019-09-26 DIAGNOSIS — R26.81 GAIT INSTABILITY: ICD-10-CM

## 2019-09-26 DIAGNOSIS — M25.562 CHRONIC PAIN OF LEFT KNEE: ICD-10-CM

## 2019-09-26 PROCEDURE — 97110 THERAPEUTIC EXERCISES: CPT | Mod: PO

## 2019-10-01 ENCOUNTER — CLINICAL SUPPORT (OUTPATIENT)
Dept: REHABILITATION | Facility: HOSPITAL | Age: 79
End: 2019-10-01
Attending: INTERNAL MEDICINE
Payer: MEDICARE

## 2019-10-01 DIAGNOSIS — G89.29 CHRONIC PAIN OF LEFT KNEE: ICD-10-CM

## 2019-10-01 DIAGNOSIS — R26.81 GAIT INSTABILITY: ICD-10-CM

## 2019-10-01 DIAGNOSIS — M25.562 CHRONIC PAIN OF LEFT KNEE: ICD-10-CM

## 2019-10-01 PROCEDURE — 97110 THERAPEUTIC EXERCISES: CPT | Mod: PO

## 2019-10-01 NOTE — PROGRESS NOTES
"  Physical Therapy Daily Treatment Note     Name: Ramon Reilly  Clinic Number: [id number]    Therapy Diagnosis:   Encounter Diagnoses   Name Primary?    Chronic pain of left knee     Gait instability      Physician: Amadeo Delatorre DO    Visit Date: 10/1/2019  Physician Orders: PT Eval and Treat  Medical Diagnosis from Referral: Acute pain of left knee   Evaluation Date: 9/19/2019  Authorization Period Expiration: 12/31/2019   Plan of Care Expiration: 11/15/2019  Visit # / Visits authorized: 4/ 20     Time In: 1000  Time Out:  1200  Total Billable Time: 60 minutes     Precautions: Standard and cancer    Subjective     Pt reports: Doing well this morning.  Pt reports he feels a little " gimpy" when walking.  Pt reports he doesn't have any pain, but has an awareness that it " isn't normal."  He was compliant with home exercise program.  Did exercises every day.  Response to previous treatment: no soreness  Functional change: none new that he has seen so far    Pain: 0/10  Location: left knee     Objective     Ramon received therapeutic exercises to develop strength, endurance, ROM and core stabilization for 50 minutes including:    Quad sets 3 x10 (3 second hold)  Heel slides 2 x 10 (3 second hold)  SLR x 20  Bridges x 30  Clamshells GTB 3 x 10 each  Prone quad stretch with strap 3 x 30 sec  HS stretch with strap 3 x 30 sec  HS curls with green theraball x 10    Leg press #60 x 20  Standing hip abduction x 20  Standing hip extension x 20  Step ups x 10  Gastroc stretch on incline 3 x 30 sec  Mini squats x 10       Ramon received the following manual therapy techniques: Joint mobilizations were applied to the: L knee for 5 minutes, including:  Patella mobs  Passive knee extension    Ramon participated in neuromuscular re-education activities to improve: Balance, Sense and Proprioception for 5 minutes. The following activities were included:    Tandem balance 2 x 30 sec  Narrow JAIDA on foam pad 2 x 30 " sec      Home Exercises Provided and Patient Education Provided     Education provided:   - HEP    Written Home Exercises Provided: Patient instructed to cont prior HEP.  Exercises were reviewed and Ramon was able to demonstrate them prior to the end of the session.  Ramon demonstrated good  understanding of the education provided.     See EMR under Patient Instructions for exercises provided 9/24/2019.    Assessment     Pt tolerated treatment very well with no increase in symptoms. Pt was progressed in reps, exercise difficulty, and balance was added today with no complaints.  Pt did require intermittent UE assistance to prevent LOB.  Will continue to progress balance and LE strengthening as tolerated.  Ramon is progressing well towards his goals.   Pt prognosis is Good.     Pt will continue to benefit from skilled outpatient physical therapy to address the deficits listed in the problem list box on initial evaluation, provide pt/family education and to maximize pt's level of independence in the home and community environment.     Pt's spiritual, cultural and educational needs considered and pt agreeable to plan of care and goals.    Anticipated barriers to physical therapy: None    Goals:   Short-Term Goals: 3 weeks  - The patient will be independent with initial home exercise program.  - The patient will increase strength to at least 4+/5 to perform functional mobility including walking with pain <2/10  - The patient will increase ROM grossly to 0 to 130 degrees to perform ambulation with pain < 2/10.     Long-Term Goals: 6 weeks  - Pt to achieve <37% limitation as measured by the FOTO to demonstrate decreased disability.  - The patient will be independent with home exercise program and symptom management.  - The patient will be independent amb with no assistive device on all surfaces for community distances.  - The patient will increase strength to at least 5/5 to perform functional mobility including  ascending/descending stairs with pain <2/10.  - The patient will perform SLS for >30s to improve his stability and balance with gait.    Plan     Continue with REID Gómez, [place]

## 2019-10-01 NOTE — PROGRESS NOTES
Physical Therapy Daily Treatment Note     Name: Ramon Reilly  Clinic Number: [id number]    Therapy Diagnosis:   Encounter Diagnoses   Name Primary?    Chronic pain of left knee     Gait instability      Physician: Amadeo Delatorre DO    Visit Date: 10/3/2019  Physician Orders: PT Eval and Treat  Medical Diagnosis from Referral: Acute pain of left knee   Evaluation Date: 9/19/2019  Authorization Period Expiration: 12/31/2019   Plan of Care Expiration: 11/15/2019  Visit # / Visits authorized: 5/ 20     Time In: 1101  Time Out:  1156  Total Billable Time: 55 minutes     Precautions: Standard and cancer    Subjective     Pt reports: No pain in his knee. He reports his (L) leg still feels weak and he is careful with how he moves to not exacerbate his knee pain.   He was compliant with home exercise program.  Did exercises every day.  Response to previous treatment: no soreness  Functional change: none new that he has seen so far    Pain: 0/10  Location: left knee     Objective     Ramon received therapeutic exercises to develop strength, endurance, ROM and core stabilization for 41 minutes including:    Recumbent bike x 5 minutes   Gastroc stretch on incline x 3 minute  Quad sets 3 x10 (3 second hold)  Heel slides 2 x 10 (3 second hold) with strap and board   SLR 2 x 10 1#  Bridges x 20 with GTB hip abduction   Clamshells GTB 3 x 10 each  Prone quad stretch with strap 3 x 30 sec  HS stretch with strap 3 x 30 sec  HS curls with green theraball x 10 - NP  LAQs 2 x 10 (3 second hold) 2#    Leg press #70 x 20  Standing hip abduction 2 x 10 RTB  Standing hip extension 2 x 10 RTB  Step ups 2 x 10 6 inch    Mini squats 2 x 10     Ramon received the following manual therapy techniques: Joint mobilizations were applied to the: L knee for 8 minutes, including:  Patella mobs  Tibiofemoral mobs grades I - IV AP/PA    Ramon participated in neuromuscular re-education activities to improve: Balance, Sense and Proprioception  for 5 minutes. The following activities were included:  Tandem balance 2 x 30 sec  Narrow JAIDA on foam pad 2 x 30 sec      Home Exercises Provided and Patient Education Provided     Education provided:   - HEP    Written Home Exercises Provided: Patient instructed to cont prior HEP.  Exercises were reviewed and Ramon was able to demonstrate them prior to the end of the session.  Ramon demonstrated good  understanding of the education provided.     See EMR under Patient Instructions for exercises provided 9/24/2019.    Assessment     Ramon tolerated increased weight well with LE strengthening exercises very well. He has improved with his knee pain and will continue to benefit from physical therapy to maximize his LE strength and functional mobility.     Ramon is progressing well towards his goals.   Pt prognosis is Good.     Pt will continue to benefit from skilled outpatient physical therapy to address the deficits listed in the problem list box on initial evaluation, provide pt/family education and to maximize pt's level of independence in the home and community environment.     Pt's spiritual, cultural and educational needs considered and pt agreeable to plan of care and goals.    Anticipated barriers to physical therapy: None    Goals:   Short-Term Goals: 3 weeks  - The patient will be independent with initial home exercise program.  - The patient will increase strength to at least 4+/5 to perform functional mobility including walking with pain <2/10  - The patient will increase ROM grossly to 0 to 130 degrees to perform ambulation with pain < 2/10.     Long-Term Goals: 6 weeks  - Pt to achieve <37% limitation as measured by the FOTO to demonstrate decreased disability.  - The patient will be independent with home exercise program and symptom management.  - The patient will be independent amb with no assistive device on all surfaces for community distances.  - The patient will increase strength to at least 5/5 to  perform functional mobility including ascending/descending stairs with pain <2/10.  - The patient will perform SLS for >30s to improve his stability and balance with gait.    Plan     Continue with POC    Mp Grady, PT

## 2019-10-03 ENCOUNTER — CLINICAL SUPPORT (OUTPATIENT)
Dept: REHABILITATION | Facility: HOSPITAL | Age: 79
End: 2019-10-03
Attending: INTERNAL MEDICINE
Payer: MEDICARE

## 2019-10-03 DIAGNOSIS — G89.29 CHRONIC PAIN OF LEFT KNEE: ICD-10-CM

## 2019-10-03 DIAGNOSIS — M25.562 CHRONIC PAIN OF LEFT KNEE: ICD-10-CM

## 2019-10-03 DIAGNOSIS — R26.81 GAIT INSTABILITY: ICD-10-CM

## 2019-10-03 PROCEDURE — 97140 MANUAL THERAPY 1/> REGIONS: CPT | Mod: PO

## 2019-10-03 PROCEDURE — 97110 THERAPEUTIC EXERCISES: CPT | Mod: PO

## 2019-10-08 ENCOUNTER — CLINICAL SUPPORT (OUTPATIENT)
Dept: REHABILITATION | Facility: HOSPITAL | Age: 79
End: 2019-10-08
Attending: INTERNAL MEDICINE
Payer: MEDICARE

## 2019-10-08 DIAGNOSIS — R26.81 GAIT INSTABILITY: ICD-10-CM

## 2019-10-08 DIAGNOSIS — G89.29 CHRONIC PAIN OF LEFT KNEE: ICD-10-CM

## 2019-10-08 DIAGNOSIS — M25.562 CHRONIC PAIN OF LEFT KNEE: ICD-10-CM

## 2019-10-08 PROCEDURE — 97110 THERAPEUTIC EXERCISES: CPT | Mod: PO

## 2019-10-08 NOTE — PROGRESS NOTES
Physical Therapy Daily Treatment Note     Name: Ramon Reilly  [facility] Number: [id number]    Therapy Diagnosis:   Encounter Diagnoses   Name Primary?    Chronic pain of left knee     Gait instability      Physician: Amadeo Delatorre DO    Visit Date: 10/8/2019  Physician Orders: PT Eval and Treat  Medical Diagnosis from Referral: Acute pain of left knee   Evaluation Date: 9/19/2019  Authorization Period Expiration: 12/31/2019   Plan of Care Expiration: 11/15/2019  Visit # / Visits authorized: 6/ 20     Time In: 1100  Time Out:  1200  Total Billable Time: 60 minutes     Precautions: Standard and cancer    Subjective     Pt reports: He is more tired today than anything.  Pt reports he was walking around his property yesterday and that night, he was having increased pain in the knee. Pt reports the highest the pain got was a 2/10 and he didn't have pain while he was doing it.  He was compliant with home exercise program.  Did exercises every day.  Response to previous treatment: no soreness  Functional change: none new that he has seen so far    Pain: 0/10  Location: left knee     Objective     Ramon received therapeutic exercises to develop strength, endurance, ROM and core stabilization for 50 minutes including:    Recumbent bike x 5 minutes   Gastroc stretch on incline x 3 minute  Quad sets 3 x10 (3 second hold)  Heel slides 2 x 10 (3 second hold) with strap and board   SLR 2 x 15 1#  Bridges x 30 with GTB hip abduction   Clamshells GTB 3 x 10 each  Prone quad stretch with strap 3 x 30 sec  HS stretch with strap 3 x 30 sec- NP  HS curls with green theraball x 10 - NP  LAQs 3 x 10 (3 second hold) 2#    Leg press #70 x 30  Standing hip abduction 2 x 10 RTB  Standing hip extension 2 x 10 RTB  Step ups 2 x 10 6 inch    Mini squats 2 x 10- NP     Ramon received the following manual therapy techniques: Joint mobilizations were applied to the: L knee for 5 minutes, including:  Patella mobs  Tibiofemoral mobs  grades I - IV AP/PA    Ramon participated in neuromuscular re-education activities to improve: Balance, Sense and Proprioception for 5 minutes. The following activities were included:  Tandem balance 2 x 30 sec  Narrow JAIDA on foam pad 2 x 30 sec      Home Exercises Provided and Patient Education Provided     Education provided:   - HEP    Written Home Exercises Provided: Patient instructed to cont prior HEP.  Exercises were reviewed and Ramon was able to demonstrate them prior to the end of the session.  Ramon demonstrated good  understanding of the education provided.     See EMR under Patient Instructions for exercises provided 9/24/2019.    Assessment     Ramon tolerated reps today with no complaints. Pt verbalizes the most fatigue with quad strengthening exercises. Pt will continue to benefit from physical therapy to maximize his LE strength and functional mobility.     Ramon is progressing well towards his goals.   Pt prognosis is Good.     Pt will continue to benefit from skilled outpatient physical therapy to address the deficits listed in the problem list box on initial evaluation, provide pt/family education and to maximize pt's level of independence in the home and community environment.     Pt's spiritual, cultural and educational needs considered and pt agreeable to plan of care and goals.    Anticipated barriers to physical therapy: None    Goals:   Short-Term Goals: 3 weeks  - The patient will be independent with initial home exercise program.  - The patient will increase strength to at least 4+/5 to perform functional mobility including walking with pain <2/10  - The patient will increase ROM grossly to 0 to 130 degrees to perform ambulation with pain < 2/10.     Long-Term Goals: 6 weeks  - Pt to achieve <37% limitation as measured by the FOTO to demonstrate decreased disability.  - The patient will be independent with home exercise program and symptom management.  - The patient will be independent  amb with no assistive device on all surfaces for community distances.  - The patient will increase strength to at least 5/5 to perform functional mobility including ascending/descending stairs with pain <2/10.  - The patient will perform SLS for >30s to improve his stability and balance with gait.    Plan     Continue with REID Gómez, CASPER

## 2019-10-10 ENCOUNTER — CLINICAL SUPPORT (OUTPATIENT)
Dept: REHABILITATION | Facility: HOSPITAL | Age: 79
End: 2019-10-10
Attending: INTERNAL MEDICINE
Payer: MEDICARE

## 2019-10-10 DIAGNOSIS — M25.562 CHRONIC PAIN OF LEFT KNEE: ICD-10-CM

## 2019-10-10 DIAGNOSIS — R26.81 GAIT INSTABILITY: ICD-10-CM

## 2019-10-10 DIAGNOSIS — G89.29 CHRONIC PAIN OF LEFT KNEE: ICD-10-CM

## 2019-10-10 PROCEDURE — 97110 THERAPEUTIC EXERCISES: CPT | Mod: PO

## 2019-10-10 NOTE — PROGRESS NOTES
Physical Therapy Daily Treatment Note     Name: Ramon Reilly  Clinic Number: [id number]    Therapy Diagnosis:   Encounter Diagnoses   Name Primary?    Chronic pain of left knee     Gait instability      Physician: Amadeo Delatorre DO    Visit Date: 10/10/2019  Physician Orders: PT Eval and Treat  Medical Diagnosis from Referral: Acute pain of left knee   Evaluation Date: 9/19/2019  Authorization Period Expiration: 12/31/2019   Plan of Care Expiration: 11/15/2019  Visit # / Visits authorized: 7/ 20     Time In: 1100  Time Out:  1200  Total Billable Time: 60 minutes     Precautions: Standard and cancer    Subjective     Pt reports: None of the exercises seem to cause him any soreness.  He was compliant with home exercise program.  Did exercises every day.  Response to previous treatment: no soreness  Functional change: none new that he has seen so far    Pain: 0/10  Location: left knee     Objective     Ramon received therapeutic exercises to develop strength, endurance, ROM and core stabilization for 50 minutes including:    Recumbent bike x 5 minutes, Level 2  Gastroc stretch on incline x 3 minute  Heel slides 3 x 10 (3 second hold) with strap and board   SLR 2 x 15 2#  Bridges x 30 with BTB hip abduction   Clamshells BTB 3 x 10 each  Prone quad stretch with strap 3 x 30 sec  HS stretch with strap 3 x 30 sec- NP  LAQs 3 x 10 (3 second hold) 2#    Leg press #70 x 30  Leg extension machine #40 x 30  Leg curl machine #45 x 30  Standing hip abduction 2 x 10 RTB  Standing hip extension 2 x 10 RTB  Step ups 3 x 10 6 inch    Mini squats 2 x 10- NP     Ramon received the following manual therapy techniques: Joint mobilizations were applied to the: L knee for 5 minutes, including:  Patella mobs  Tibiofemoral mobs grades I - IV AP/PA    Ramon participated in neuromuscular re-education activities to improve: Balance, Sense and Proprioception for 5 minutes. The following activities were included:  Tandem balance 2 x  30 sec  Narrow JAIDA on foam pad 2 x 30 sec  SLB x 30 sec- L leg felt like it was going to give out      Home Exercises Provided and Patient Education Provided     Education provided:   - HEP    Written Home Exercises Provided: Patient instructed to cont prior HEP.  Exercises were reviewed and Ramon was able to demonstrate them prior to the end of the session.  Ramon demonstrated good  understanding of the education provided.     See EMR under Patient Instructions for exercises provided 9/24/2019.    Assessment     Ramon tolerated reps today with no complaints. Pt able to tolerate additional machines today.  Pt unable to perform SLB more than once because of increased pain and fatigue in the knee. Pt will continue to benefit from physical therapy to maximize his LE strength and functional mobility.     Ramon is progressing well towards his goals.   Pt prognosis is Good.     Pt will continue to benefit from skilled outpatient physical therapy to address the deficits listed in the problem list box on initial evaluation, provide pt/family education and to maximize pt's level of independence in the home and community environment.     Pt's spiritual, cultural and educational needs considered and pt agreeable to plan of care and goals.    Anticipated barriers to physical therapy: None    Goals:   Short-Term Goals: 3 weeks  - The patient will be independent with initial home exercise program.  - The patient will increase strength to at least 4+/5 to perform functional mobility including walking with pain <2/10  - The patient will increase ROM grossly to 0 to 130 degrees to perform ambulation with pain < 2/10.     Long-Term Goals: 6 weeks  - Pt to achieve <37% limitation as measured by the FOTO to demonstrate decreased disability.  - The patient will be independent with home exercise program and symptom management.  - The patient will be independent amb with no assistive device on all surfaces for community distances.  - The  patient will increase strength to at least 5/5 to perform functional mobility including ascending/descending stairs with pain <2/10.  - The patient will perform SLS for >30s to improve his stability and balance with gait.    Plan     Continue with REID Gómez PTA

## 2019-10-14 NOTE — PROGRESS NOTES
Physical Therapy Daily Treatment Note     Name: Ramon Reilly  Clinic Number: [id number]    Therapy Diagnosis:   Encounter Diagnoses   Name Primary?    Chronic pain of left knee     Gait instability      Physician: Amadeo Delatorre DO    Visit Date: 10/15/2019  Physician Orders: PT Eval and Treat  Medical Diagnosis from Referral: Acute pain of left knee   Evaluation Date: 9/19/2019  Authorization Period Expiration: 12/31/2019   Plan of Care Expiration: 11/15/2019  Visit # / Visits authorized 8/ 20     Time In: 1059  Time Out:  1157  Total Billable Time: 58 minutes     Precautions: Standard and cancer    Subjective     Pt reports: He reports his knee still feels weak, but reports it is doing progressively better.   He was compliant with home exercise program.  Did exercises every day.  Response to previous treatment: no soreness  Functional change: none new that he has seen so far    Pain: 0/10  Location: left knee     Objective     Ramon received therapeutic exercises to develop strength, endurance, ROM and core stabilization for 53 minutes including:    Recumbent bike x 5 minutes, Level 2  Gastroc stretch on incline x 3 minute  Heel slides 3 x 10 (3 second hold) with strap and board   SLR 2 x 15 (no weight today due to complaints of mild hip pain, restricted ROM and did not provoke pain)  Bridges x 30 with BTB hip abduction   Clamshells BTB 3 x 10 each  Prone quad stretch with strap 3 x 30 sec (Towel under the knee)  HS stretch with strap 3 x 30 sec  LAQs 2 x 10 (3 second hold) 4#  SL hip abduction x 10 - cues for form    Leg press #80 x 2 x 10   Leg extension machine #40 x 30 - NP (time constraints)  Leg curl machine #45 x 30  - NP (time constraints)  Standing hip abduction 2 x 10 RTB  Standing hip extension 2 x 10 RTB  Step ups 2 x 10 8 inch    Mini squats 2 x 10- NP     Ramon received the following manual therapy techniques: Joint mobilizations were applied to the: L knee for 00 minutes,  including:  Patella mobs  Tibiofemoral mobs grades I - IV AP/PA    Ramon participated in neuromuscular re-education activities to improve: Balance, Sense and Proprioception for 5 minutes. The following activities were included:  Tandem balance 2 x 30 sec  Narrow JAIDA on foam pad 2 x 30 sec  SLB x 30 sec(able to maintain for periods of ~10s)      Home Exercises Provided and Patient Education Provided     Education provided:   - HEP    Written Home Exercises Provided: Patient instructed to cont prior HEP.  Exercises were reviewed and Ramon was able to demonstrate them prior to the end of the session.  Ramon demonstrated good  understanding of the education provided.     See EMR under Patient Instructions for exercises provided 9/24/2019.    Assessment     Ramon is progressing well with his knee ROM and LE strength, but remains limited with his hip strength and balance. Decreased resistance on SLR today due to complaints of hip pain when performing exercise at home and limited ROM. He did not have any exacerbation of his pain with decreased resistance and ROM on exercise.  Remains significantly limited with SL balance.    Ramon is progressing well towards his goals.   Pt prognosis is Good.     Pt will continue to benefit from skilled outpatient physical therapy to address the deficits listed in the problem list box on initial evaluation, provide pt/family education and to maximize pt's level of independence in the home and community environment.     Pt's spiritual, cultural and educational needs considered and pt agreeable to plan of care and goals.    Anticipated barriers to physical therapy: None    Goals:   Short-Term Goals: 3 weeks  - The patient will be independent with initial home exercise program. (Met)  - The patient will increase strength to at least 4+/5 to perform functional mobility including walking with pain <2/10. (Met)  - The patient will increase ROM grossly to 0 to 130 degrees to perform ambulation  with pain < 2/10. (Met)    Long-Term Goals: 6 weeks  - Pt to achieve <37% limitation as measured by the FOTO to demonstrate decreased disability. (Progressing, not met)  - The patient will be independent with home exercise program and symptom management. (Met)  - The patient will be independent amb with no assistive device on all surfaces for community distances. (Met)  - The patient will increase strength to at least 5/5 to perform functional mobility including ascending/descending stairs with pain <2/10.(Progressing, not met)  - The patient will perform SLS for >30s to improve his stability and balance with gait. (Progressing, not met)    Plan     Continue with POC, focus on glute strengthening, balance and functional mobility     Mp Grady, PT

## 2019-10-15 ENCOUNTER — CLINICAL SUPPORT (OUTPATIENT)
Dept: REHABILITATION | Facility: HOSPITAL | Age: 79
End: 2019-10-15
Attending: INTERNAL MEDICINE
Payer: MEDICARE

## 2019-10-15 ENCOUNTER — DOCUMENTATION ONLY (OUTPATIENT)
Dept: REHABILITATION | Facility: HOSPITAL | Age: 79
End: 2019-10-15

## 2019-10-15 DIAGNOSIS — G89.29 CHRONIC PAIN OF LEFT KNEE: ICD-10-CM

## 2019-10-15 DIAGNOSIS — M25.562 CHRONIC PAIN OF LEFT KNEE: ICD-10-CM

## 2019-10-15 DIAGNOSIS — R26.81 GAIT INSTABILITY: ICD-10-CM

## 2019-10-15 PROCEDURE — 97110 THERAPEUTIC EXERCISES: CPT | Mod: PO

## 2019-10-15 NOTE — PLAN OF CARE
"[facility]  Physical Therapy Reassessment    Name: Ramon Reilly  Clinic Number: [id number]    Therapy Diagnosis:   Encounter Diagnoses   Name Primary?    Chronic pain of left knee     Gait instability      Physician: Amadeo Delatorre DO    Visit Date: 10/15/2019  Physician Orders: PT Eval and Treat  Medical Diagnosis from Referral: Acute pain of left knee   Evaluation Date: 9/19/2019  Authorization Period Expiration: 12/31/2019   Plan of Care Expiration: 11/15/2019  Visit # / Visits authorized 8/ 20    Precautions: Standard and cancer    Subjective   Date of onset: A few years ago, went to [place] about 3 weeks ago   History of current condition - Ramon reports: He reports his knee has been hurting for a few years. He reports "every few years I will twist it and sprain it." He reports he used to live at a home with a few flights of stairs that exacerbated his pain, but he has moved from that home to a home with no stairs. He reports his most recent increase in pain occurred after he went on vacation 3 weeks ago to [place], which involved a lot of walking and stair climbing. Climbing stairs and stepping onto his zero turn lawnmower increase his pain. He reports his pain increases with standing and walking. No history of falls.     Follow up 10/15: He reports his knee still feels weak, but he is doing progressively better. He reports he feels about 25% better since beginning PT, but he is still having difficulty with balance and ascending ramps and stairs due to weakness.      Medical History:   Past Medical History:   Diagnosis Date    BPH (benign prostatic hyperplasia)     Cancer     prostate    Wears glasses        Surgical History:   Ramon Reilly  has a past surgical history that includes Hernia repair (1990); [place] tooth extraction; TONSILLECTOMY, ADENOIDECTOMY; Cholecystectomy; Transrectal biopsy of prostate with ultrasound guidance (N/A, 6/4/2018); Cystoscopy " (N/A, 6/4/2018); Colonoscopy (N/A, 6/21/2018); Eye surgery (Right); Transrectal ultrasound examination (N/A, 7/30/2018); Diagnostic laparoscopy (N/A, 7/31/2018); and Biopsy of abdominal wall (N/A, 7/31/2018).    Medications:   Ramon has a current medication list which includes the following prescription(s): aspirin, calcium carbonate, cholecalciferol (vitamin d3), multivitamin, and tamsulosin.    Allergies:   Review of patient's allergies indicates:  No Known Allergies     Imaging, X-ray: The bones are osteopenic.  There is moderate-severe bilateral medial tibiofemoral joint space narrowing.  There is tricompartmental osteophyte formation in both knees.  There is right lateral patellar tilt and mild right lateral patellofemoral joint space narrowing.  No acute fracture or osseous destructive process.  There is left quadriceps tendon insertion patellar enthesophyte.  No left knee joint effusion.     Prior Therapy: None  Social History: Lives with family, no stairs, one step to get into carport   Occupation: Retired, spends time walking when he can  Prior Level of Function: Mild knee pain, which stopped when he moved to [place] in January  Current Level of Function: Currently has pain with ambulation and ascending stairs    Pain:  Current 0/10, worst 5/10, best 0/10   Location: left knee   Description: Aching and Dull  Aggravating Factors: Standing, Walking and Ascending stairs   Easing Factors: relaxation and rest    Pts goals: To improve his endurance     Objective     Observation: Patient in no acute distress    Posture: Stands with mild genu-varum     Range of Motion:   Knee Left active Left Passive Right Active R passive   Flexion 124 130 126 NT   Extension -2 0 0  0       Lower Extremity Strength  Right LE  Left LE    Knee extension: 5/5 Knee extension: 5/5   Knee flexion: 5/5 Knee flexion: 4+/5   Hip flexion: 5/5 Hip flexion: 4+/5   Hip extension:  4+/5 Hip extension: 4+/5   Hip abduction: 4+/5 Hip abduction:  4-/5   Ankle dorsiflexion: 5/5 Ankle dorsiflexion: 5/5   Ankle plantarflexion: 5/5 Ankle plantarflexion: 5/5     Special Tests:   Left Right   Valgus Stress Test - -   Varus Stress test - -   Lachman's test - -   Posterior Lachman - -   Ángel's Test - -   Apley's Compression - -   Apley's Distraction - -       Function:    - SLS R: 13s with significant sway noted  - SLS L: 10s with significant sway  - Squat: Dysfunctional nonpainful, increased knee flexion  - Sit <--> Stand: Independent     Joint Mobility: Mild (L) Patellar Hypomobiltiy    Palpation: Mild pain at medial joint line     Sensation: Intact to LT L3-S2 dermatomes. Reports numbness and pressure sensation across plantar surface of (L) foot     Flexibility:    Ely's test: R = Moderately limited ; L = Severely limited   Hamstring: R = Mildly limited; L = Mildly limited      CMS Impairment/Limitation/Restriction for FOTO Knee Survey    Therapist reviewed FOTO scores for Ramon Reilly on 10/15/2019.   FOTO documents entered into EPIC - see Media section.    Limitation Score: 49%  Category: Mobility         TREATMENT   See Daily Note    Assessment   Ramon is a 78 y.o. male referred to outpatient Physical Therapy with a medical diagnosis of Acute pain of left knee. Physical exam is consistent with (L) chronic left knee pain. Since beginning PT, Ramon has been seen 8 times since initial evaluation on 09/19/2019. Overall, Ramon has made good, steady progress with his PT treatments and has worked hard towards all of his PT goals as evidenced by subjective and objective improvements. He has made significant improvements with his L knee ROM and LE strength. Despite these improvements, he remains with deficits with hip abduction strength, soft tissue restriction,  and balance and will continue to benefit from skilled PT to address remaining limitations and increase functional mobility. Goals updated to reflect progress.     Pt prognosis is Good.   Pt will  benefit from skilled outpatient Physical Therapy to address the deficits stated above and in the chart below, provide pt/family education, and to maximize pt's level of independence.     Plan of care discussed with patient: Yes  Pt's spiritual, cultural and educational needs considered and patient is agreeable to the plan of care and goals as stated below:     Anticipated Barriers for therapy: None    Medical Necessity is demonstrated by the following  History  Co-morbidities and personal factors that may impact the plan of care Co-morbidities:   History of prostate cancer and osteoporosis     Personal Factors:   age     moderate   Examination  Body Structures and Functions, activity limitations and participation restrictions that may impact the plan of care Body Regions:   lower extremities    Body Systems:    ROM  strength  gross coordinated movement  balance  gait    Participation Restrictions:   Pain with walking, stepping up, using his riding lawnmower    Activity limitations:   Learning and applying knowledge  no deficits    General Tasks and Commands  no deficits    Communication  no deficits    Mobility  walking    Self care  no deficits    Domestic Life  doing house work (cleaning house, washing dishes, laundry)    Interactions/Relationships  no deficits    Life Areas  no deficits    Community and Social Life  recreation and leisure         low   Clinical Presentation stable and uncomplicated low   Decision Making/ Complexity Score: low     Goals:  Short-Term Goals: 3 weeks  - The patient will be independent with initial home exercise program. (Met)  - The patient will increase strength to at least 4+/5 to perform functional mobility including walking with pain <2/10. (Met)  - The patient will increase ROM grossly to 0 to 130 degrees to perform ambulation with pain < 2/10. (Met)    Long-Term Goals: 6 weeks  - Pt to achieve <37% limitation as measured by the FOTO to demonstrate decreased disability.  (Progressing, not met)  - The patient will be independent with home exercise program and symptom management. (Met)  - The patient will be independent amb with no assistive device on all surfaces for community distances. (Met)  - The patient will increase strength to at least 5/5 to perform functional mobility including ascending/descending stairs with pain <2/10.(Progressing, not met)  - The patient will perform SLS for >30s to improve his stability and balance with gait. (Progressing, not met)    Plan   Plan of care Certification: 10/15/2019 to 11/15/2019.    Outpatient Physical Therapy 2 times weekly for 12 visits to include the following interventions: Electrical Stimulation prn, Gait Training, Manual Therapy, Moist Heat/ Ice, Neuromuscular Re-ed, Patient Education, Therapeutic Activites and Therapeutic Exercise.     Mp Grady, PT

## 2019-10-15 NOTE — PROGRESS NOTES
PT met face to face with Tianna SHIRLEY, to discuss patient's treatment plan and progress towards established goals.  Treatment will be continued as described in initial report/eval and progress notes.  Patient will be seen by physical therapist minimally every sixth visit and at least once per month.      Mp Grady, PT  10/15/2019     Face to face meeting completed with Mp Grady regarding current status and progress of   Ramon Gómez PTA .

## 2019-10-17 ENCOUNTER — CLINICAL SUPPORT (OUTPATIENT)
Dept: REHABILITATION | Facility: HOSPITAL | Age: 79
End: 2019-10-17
Attending: INTERNAL MEDICINE
Payer: MEDICARE

## 2019-10-17 DIAGNOSIS — G89.29 CHRONIC PAIN OF LEFT KNEE: ICD-10-CM

## 2019-10-17 DIAGNOSIS — R26.81 GAIT INSTABILITY: ICD-10-CM

## 2019-10-17 DIAGNOSIS — M25.562 CHRONIC PAIN OF LEFT KNEE: ICD-10-CM

## 2019-10-17 PROCEDURE — 97110 THERAPEUTIC EXERCISES: CPT | Mod: PO

## 2019-10-17 NOTE — PROGRESS NOTES
Physical Therapy Daily Treatment Note     Name: Ramon Padilla Tommie  Clinic Number: [id number]    Therapy Diagnosis:   Encounter Diagnoses   Name Primary?    Chronic pain of left knee     Gait instability      Physician: Amadeo Delatorre DO    Visit Date: 10/17/2019  Physician Orders: PT Eval and Treat  Medical Diagnosis from Referral: Acute pain of left knee   Evaluation Date: 9/19/2019  Authorization Period Expiration: 12/31/2019   Plan of Care Expiration: 11/15/2019  Visit # / Visits authorized 9/ 20     Time In: 1100  Time Out:  1200  Total Billable Time: 60 minutes     Precautions: Standard and cancer    Subjective     Pt reports:He has not been sore yet following therapy.   He was compliant with home exercise program.  Did exercises every day.  Response to previous treatment: no soreness  Functional change: none new that he has seen so far    Pain: 0/10  Location: left knee     Objective     Ramon received therapeutic exercises to develop strength, endurance, ROM and core stabilization for 52 minutes including:    Recumbent bike x 5 minutes, Level 2  Gastroc stretch on incline x 3 minute  Heel slides 3 x 10 (3 second hold) with strap and board   SLR 2 x 15 #1  Bridges x 30 with BTB hip abduction   Clamshells BTB 3 x 10 each  Prone quad stretch with strap 3 x 30 sec (Towel under the knee)  HS stretch with strap 3 x 30 sec  LAQs 3 x 10 (3 second hold) 4#  SL hip abduction 3 x 10 - cues for form    Leg press #80 x 2 x 10   Leg extension machine #40 x 30  Leg curl machine #45 x 30  Standing hip abduction 2 x 10 RTB- NP  Standing hip extension 2 x 10 RTB- NP  Step ups 2 x 10 8 inch    Mini squats 2 x 10- NP     Ramon received the following manual therapy techniques: Joint mobilizations were applied to the: L knee for 3 minutes, including:  Patella mobs  Tibiofemoral mobs grades I - IV AP/PA    Ramon participated in neuromuscular re-education activities to improve: Balance, Sense and Proprioception for 5  minutes. The following activities were included:  Tandem balance 2 x 30 sec  Narrow JAIDA on foam pad 2 x 30 sec  SLB x 30 sec(able to maintain for periods of ~10s)      Home Exercises Provided and Patient Education Provided     Education provided:   - HEP    Written Home Exercises Provided: Patient instructed to cont prior HEP.  Exercises were reviewed and Ramon was able to demonstrate them prior to the end of the session.  Ramon demonstrated good  understanding of the education provided.     See EMR under Patient Instructions for exercises provided 9/24/2019.    Assessment     Ramon tolerated treatment very well.  Pt still very challenged in SLB.  Will continue progressing strength within tolerance.  Ramon is progressing well towards his goals.   Pt prognosis is Good.     Pt will continue to benefit from skilled outpatient physical therapy to address the deficits listed in the problem list box on initial evaluation, provide pt/family education and to maximize pt's level of independence in the home and community environment.     Pt's spiritual, cultural and educational needs considered and pt agreeable to plan of care and goals.    Anticipated barriers to physical therapy: None    Goals:   Short-Term Goals: 3 weeks  - The patient will be independent with initial home exercise program. (Met)  - The patient will increase strength to at least 4+/5 to perform functional mobility including walking with pain <2/10. (Met)  - The patient will increase ROM grossly to 0 to 130 degrees to perform ambulation with pain < 2/10. (Met)    Long-Term Goals: 6 weeks  - Pt to achieve <37% limitation as measured by the FOTO to demonstrate decreased disability. (Progressing, not met)  - The patient will be independent with home exercise program and symptom management. (Met)  - The patient will be independent amb with no assistive device on all surfaces for community distances. (Met)  - The patient will increase strength to at least 5/5 to  perform functional mobility including ascending/descending stairs with pain <2/10.(Progressing, not met)  - The patient will perform SLS for >30s to improve his stability and balance with gait. (Progressing, not met)    Plan     Continue with POC, focus on glute strengthening, balance and functional mobility     Tianna Gómez, PTA

## 2019-10-21 NOTE — PROGRESS NOTES
Physical Therapy Daily Treatment Note     Name: Ramon Reilly  Clinic Number: [id number]    Therapy Diagnosis:   Encounter Diagnoses   Name Primary?    Chronic pain of left knee     Gait instability      Physician: Amadeo Delatorre DO    Visit Date: 10/22/2019  Physician Orders: PT Eval and Treat  Medical Diagnosis from Referral: Acute pain of left knee   Evaluation Date: 9/19/2019  Authorization Period Expiration: 12/31/2019   Plan of Care Expiration: 11/15/2019  Visit # / Visits authorized 10/ 20     Time In: 1100  Time Out:  1200  Total Billable Time: 30  minutes     Precautions: Standard and cancer    Subjective     Pt reports: His knee is still feeling weak, but no pain or episodes of instability. He reports he is still hesitant to fully shift weight to his (L) going downhill.   He was compliant with home exercise program.  Did exercises every day.  Response to previous treatment: no soreness  Functional change: none new that he has seen so far    Pain: 0/10  Location: left knee     Objective     Ramon received therapeutic exercises to develop strength, endurance, ROM and core stabilization for 50  minutes including:    Recumbent bike x 5 minutes, Level 2  Gastroc stretch on incline x 3 minute  Heel slides 3 x 10 (3 second hold) with strap and board   SLR 2 x 15 #1  Bridges x 30 with BTB hip abduction   Clamshells BTB 3 x 10 each  Prone quad stretch with strap 3 x 30 sec (Towel under the knee)  HS stretch with strap 3 x 30 sec  LAQs 3 x 10 (3 second hold) 4#  SL hip abduction 3 x 10 - cues for form    Leg press #80 x 2 x 10   Leg extension machine #40 x 30  Leg curl machine #45 x 30  Standing hip abduction 2 x 10 RTB  Standing hip extension 2 x 10 RTB  Step ups 2 x 10 8 inch    Mini squats 2 x 10- NP    Ramon participated in neuromuscular re-education activities to improve: Balance, Sense and Proprioception for 5 minutes. The following activities were included:  Tandem balance 2 x 30 sec  Narrow JAIDA  on foam pad 2 x 30 sec  SLB x 30 sec(able to maintain for periods of ~10s)      Home Exercises Provided and Patient Education Provided     Education provided:   - HEP    Written Home Exercises Provided: yes.  Exercises were reviewed and Ramon was able to demonstrate them prior to the end of the session.  Ramon demonstrated good  understanding of the education provided.     See EMR under Media for exercises provided prior visit.    Assessment     Ramon is progressing well with LE strengthening exercises and has continued relief of his knee pain since his last visit. Updated his HEP with exercises he is performing in the clinic with plan to reduce frequency to 1x/week as he progresses independently with his HEP. He remains impaired with balance exercises and will continue to benefit from physical therapy.    Ramon is progressing well towards his goals.   Pt prognosis is Good.     Pt will continue to benefit from skilled outpatient physical therapy to address the deficits listed in the problem list box on initial evaluation, provide pt/family education and to maximize pt's level of independence in the home and community environment.     Pt's spiritual, cultural and educational needs considered and pt agreeable to plan of care and goals.    Anticipated barriers to physical therapy: None    Goals:   Short-Term Goals: 3 weeks  - The patient will be independent with initial home exercise program. (Met)  - The patient will increase strength to at least 4+/5 to perform functional mobility including walking with pain <2/10. (Met)  - The patient will increase ROM grossly to 0 to 130 degrees to perform ambulation with pain < 2/10. (Met)    Long-Term Goals: 6 weeks  - Pt to achieve <37% limitation as measured by the FOTO to demonstrate decreased disability. (Progressing, not met)  - The patient will be independent with home exercise program and symptom management. (Met)  - The patient will be independent amb with no assistive  device on all surfaces for community distances. (Met)  - The patient will increase strength to at least 5/5 to perform functional mobility including ascending/descending stairs with pain <2/10.(Progressing, not met)  - The patient will perform SLS for >30s to improve his stability and balance with gait. (Progressing, not met)    Plan     Continue with POC, focus on glute strengthening, balance and functional mobility     Mp Grady, PT

## 2019-10-22 ENCOUNTER — CLINICAL SUPPORT (OUTPATIENT)
Dept: REHABILITATION | Facility: HOSPITAL | Age: 79
End: 2019-10-22
Attending: INTERNAL MEDICINE
Payer: MEDICARE

## 2019-10-22 DIAGNOSIS — R26.81 GAIT INSTABILITY: ICD-10-CM

## 2019-10-22 DIAGNOSIS — G89.29 CHRONIC PAIN OF LEFT KNEE: ICD-10-CM

## 2019-10-22 DIAGNOSIS — M25.562 CHRONIC PAIN OF LEFT KNEE: ICD-10-CM

## 2019-10-22 PROCEDURE — 97110 THERAPEUTIC EXERCISES: CPT | Mod: PO

## 2019-10-28 NOTE — PROGRESS NOTES
Physical Therapy Daily Treatment Note     Name: Ramon Reilly  Clinic Number: [id number]    Therapy Diagnosis:   Encounter Diagnoses   Name Primary?    Chronic pain of left knee     Gait instability      Physician: Amadeo Delatorre DO    Visit Date: 10/29/2019  Physician Orders: PT Eval and Treat  Medical Diagnosis from Referral: Acute pain of left knee   Evaluation Date: 9/19/2019  Authorization Period Expiration: 12/31/2019   Plan of Care Expiration: 11/15/2019  Visit # / Visits authorized 11/ 20     Time In: 1057  Time Out:  1155  Total Billable Time: 58 minutes     Precautions: Standard and cancer    Subjective     Pt reports: He has been compliant with his exercises and feels like his leg is stronger.   He was compliant with home exercise program.  Did exercises every day.  Response to previous treatment: no soreness  Functional change: none new that he has seen so far    Pain: 0/10  Location: left knee     Objective     Ramon received therapeutic exercises to develop strength, endurance, ROM and core stabilization for 53 minutes including:    Upright bike x 5 minutes, Level 2  Gastroc stretch on incline x 3 minute  Heel slides 3 x 10 (3 second hold) with strap and board   SLR 2 x 10 #2  Bridges x 30 with BTB hip abduction   Clamshells BTB 3 x 10 each  Prone quad stretch with strap 3 x 30 sec   HS stretch with strap 3 x 30 sec  LAQs 3 x 10 (3 second hold) 5#  SL hip abduction 3 x 10 - cues for form    Leg press #80 x 2 x 10   Leg extension machine #45 x 30  Leg curl machine #45 x 30  Standing hip abduction 2 x 10 RTB  Standing hip extension 2 x 10 RTB  Step ups 2 x 10 8 inch    Ramon participated in neuromuscular re-education activities to improve: Balance, Sense and Proprioception for 5 minutes. The following activities were included:  Tandem balance 2 x 30 sec  Narrow JAIDA on foam pad 2 x 30 sec  SLB x 30 sec(able to maintain for periods of ~10s)    Home Exercises Provided and Patient Education  Provided     Education provided:   - HEP    Written Home Exercises Provided: yes.  Exercises were reviewed and Ramon was able to demonstrate them prior to the end of the session.  Ramon demonstrated good  understanding of the education provided.     See EMR under Media for exercises provided prior visit.    Assessment     Ramon has progressed well with his LE strengthening. He is independent with his HEP and symptom management at this time. He is discharged from physical therapy.    Ramon is progressing well towards his goals.   Pt prognosis is Good.     Pt will continue to benefit from skilled outpatient physical therapy to address the deficits listed in the problem list box on initial evaluation, provide pt/family education and to maximize pt's level of independence in the home and community environment.     Pt's spiritual, cultural and educational needs considered and pt agreeable to plan of care and goals.    Anticipated barriers to physical therapy: None    Goals:   Short-Term Goals: 3 weeks  - The patient will be independent with initial home exercise program. (Met)  - The patient will increase strength to at least 4+/5 to perform functional mobility including walking with pain <2/10. (Met)  - The patient will increase ROM grossly to 0 to 130 degrees to perform ambulation with pain < 2/10. (Met)    Long-Term Goals: 6 weeks  - Pt to achieve <37% limitation as measured by the FOTO to demonstrate decreased disability. (Progressing, not met)  - The patient will be independent with home exercise program and symptom management. (Met)  - The patient will be independent amb with no assistive device on all surfaces for community distances. (Met)  - The patient will increase strength to at least 5/5 to perform functional mobility including ascending/descending stairs with pain <2/10.(Progressing, not met)  - The patient will perform SLS for >30s to improve his stability and balance with gait. (Progressing, not  met)    Plan     Discharge from PT    Mp Grady, PT

## 2019-10-29 ENCOUNTER — CLINICAL SUPPORT (OUTPATIENT)
Dept: REHABILITATION | Facility: HOSPITAL | Age: 79
End: 2019-10-29
Attending: INTERNAL MEDICINE
Payer: MEDICARE

## 2019-10-29 DIAGNOSIS — M25.562 CHRONIC PAIN OF LEFT KNEE: ICD-10-CM

## 2019-10-29 DIAGNOSIS — R26.81 GAIT INSTABILITY: ICD-10-CM

## 2019-10-29 DIAGNOSIS — G89.29 CHRONIC PAIN OF LEFT KNEE: ICD-10-CM

## 2019-10-29 PROCEDURE — 97110 THERAPEUTIC EXERCISES: CPT | Mod: PO

## 2019-10-29 NOTE — PLAN OF CARE
"[facility]  Physical Therapy Reassessment and Discharge Summary    Name: Ramon Reilly  Clinic Number: [id number]    Therapy Diagnosis:   Encounter Diagnoses   Name Primary?    Chronic pain of left knee     Gait instability      Physician: Amadeo Delatorre DO    Visit Date: 10/29/2019  Physician Orders: PT Eval and Treat  Medical Diagnosis from Referral: Acute pain of left knee   Evaluation Date: 9/19/2019  Authorization Period Expiration: 12/31/2019   Plan of Care Expiration: 11/15/2019  Visit # / Visits authorized 11/ 20    Precautions: Standard and cancer    Subjective   Date of onset: A few years ago, went to [place] about 3 weeks ago   History of current condition - Ramon reports: He reports his knee has been hurting for a few years. He reports "every few years I will twist it and sprain it." He reports he used to live at a home with a few flights of stairs that exacerbated his pain, but he has moved from that home to a home with no stairs. He reports his most recent increase in pain occurred after he went on vacation 3 weeks ago to [place], which involved a lot of walking and stair climbing. Climbing stairs and stepping onto his zero turn lawnmower increase his pain. He reports his pain increases with standing and walking. No history of falls.     Follow up 10/15: He reports his knee still feels weak, but he is doing progressively better. He reports he feels about 25% better since beginning PT, but he is still having difficulty with balance and ascending ramps and stairs due to weakness.     Follow up 10/29: He reports his strength is doing better. He reports he still has to be careful with turning around when walking and with how he walks. He reports he bought a brace, but did not think the brace helped.      Medical History:   Past Medical History:   Diagnosis Date    BPH (benign prostatic hyperplasia)     Cancer     prostate    Wears glasses        Surgical " History:   Ramon Reilly  has a past surgical history that includes Hernia repair (1990); [place] tooth extraction; TONSILLECTOMY, ADENOIDECTOMY; Cholecystectomy; Transrectal biopsy of prostate with ultrasound guidance (N/A, 6/4/2018); Cystoscopy (N/A, 6/4/2018); Colonoscopy (N/A, 6/21/2018); Eye surgery (Right); Transrectal ultrasound examination (N/A, 7/30/2018); Diagnostic laparoscopy (N/A, 7/31/2018); and Biopsy of abdominal wall (N/A, 7/31/2018).    Medications:   Ramon has a current medication list which includes the following prescription(s): aspirin, calcium carbonate, cholecalciferol (vitamin d3), multivitamin, and tamsulosin.    Allergies:   Review of patient's allergies indicates:  No Known Allergies     Imaging, X-ray: The bones are osteopenic.  There is moderate-severe bilateral medial tibiofemoral joint space narrowing.  There is tricompartmental osteophyte formation in both knees.  There is right lateral patellar tilt and mild right lateral patellofemoral joint space narrowing.  No acute fracture or osseous destructive process.  There is left quadriceps tendon insertion patellar enthesophyte.  No left knee joint effusion.     Prior Therapy: None  Social History: Lives with family, no stairs, one step to get into carport   Occupation: Retired, spends time walking when he can  Prior Level of Function: Mild knee pain, which stopped when he moved to [place] in January  Current Level of Function: Currently has pain with ambulation and ascending stairs    Pain:  Current 0/10, worst 5/10, best 0/10   Location: left knee   Description: Aching and Dull  Aggravating Factors: Standing, Walking and Ascending stairs   Easing Factors: relaxation and rest    Pts goals: To improve his endurance     Objective     Observation: Patient in no acute distress    Posture: Stands with mild genu-varum     Range of Motion:   Knee Left active Left Passive Right Active R passive   Flexion 124 130 126 NT   Extension -2 0 0  0        Lower Extremity Strength  Right LE  Left LE    Knee extension: 5/5 Knee extension: 5/5   Knee flexion: 5/5 Knee flexion: 4+/5   Hip flexion: 5/5 Hip flexion: 4+/5   Hip extension:  4+/5 Hip extension: 4+/5   Hip abduction: 4+/5 Hip abduction: 4+/5   Ankle dorsiflexion: 5/5 Ankle dorsiflexion: 5/5   Ankle plantarflexion: 5/5 Ankle plantarflexion: 5/5     Special Tests:   Left Right   Valgus Stress Test - -   Varus Stress test - -   Lachman's test - -   Posterior Lachman - -   Ángel's Test - -   Apley's Compression - -   Apley's Distraction - -       Function:    - SLS R: 22s with significant sway noted  - SLS L: 10s with significant sway  - Squat: Dysfunctional nonpainful, increased knee flexion  - Sit <--> Stand: Independent     Joint Mobility: Mild (L) Patellar Hypomobiltiy    Palpation: Mild pain at medial joint line     Sensation: Intact to LT L3-S2 dermatomes. Reports numbness and pressure sensation across plantar surface of (L) foot     Flexibility:    Ely's test: R = Moderately limited ; L = Severely limited   Hamstring: R = Mildly limited; L = Mildly limited      CMS Impairment/Limitation/Restriction for FOTO Knee Survey    Therapist reviewed FOTO scores for Ramon Reilly on 10/29/2019.   FOTO documents entered into EPIC - see Media section.    Limitation Score: 47%  Category: Mobility         TREATMENT   See Daily Note    Assessment   Ramon is a 79 y.o. male referred to outpatient Physical Therapy with a medical diagnosis of Acute pain of left knee. Physical exam is consistent with (L) chronic left knee pain. Since beginning PT, Ramon has been seen 11 times since initial evaluation on 09/19/2019. Overall, Ramon has continued to have improvement with his LE strengthening, balance, and functional mobility. He remains impaired with his SL balance, but is independent with his HEP and symptom management at this time and is having no pain with his ADLs. He verbalized understanding about importance to  continue with LE strengthening at home. Hd is discharged from physical therapy.     Pt prognosis is Good.   Pt will benefit from skilled outpatient Physical Therapy to address the deficits stated above and in the chart below, provide pt/family education, and to maximize pt's level of independence.     Plan of care discussed with patient: Yes  Pt's spiritual, cultural and educational needs considered and patient is agreeable to the plan of care and goals as stated below:     Anticipated Barriers for therapy: None    Medical Necessity is demonstrated by the following  History  Co-morbidities and personal factors that may impact the plan of care Co-morbidities:   History of prostate cancer and osteoporosis     Personal Factors:   age     moderate   Examination  Body Structures and Functions, activity limitations and participation restrictions that may impact the plan of care Body Regions:   lower extremities    Body Systems:    ROM  strength  gross coordinated movement  balance  gait    Participation Restrictions:   Pain with walking, stepping up, using his riding lawnmower    Activity limitations:   Learning and applying knowledge  no deficits    General Tasks and Commands  no deficits    Communication  no deficits    Mobility  walking    Self care  no deficits    Domestic Life  doing house work (cleaning house, washing dishes, laundry)    Interactions/Relationships  no deficits    Life Areas  no deficits    Community and Social Life  recreation and leisure         low   Clinical Presentation stable and uncomplicated low   Decision Making/ Complexity Score: low     Goals:  Short-Term Goals: 3 weeks  - The patient will be independent with initial home exercise program. (Met)  - The patient will increase strength to at least 4+/5 to perform functional mobility including walking with pain <2/10. (Met)  - The patient will increase ROM grossly to 0 to 130 degrees to perform ambulation with pain < 2/10. (Met)    Long-Term  Goals: 6 weeks  - Pt to achieve <37% limitation as measured by the FOTO to demonstrate decreased disability. (Progressing, not met)  - The patient will be independent with home exercise program and symptom management. (Met)  - The patient will be independent amb with no assistive device on all surfaces for community distances. (Met)  - The patient will increase strength to at least 5/5 to perform functional mobility including ascending/descending stairs with pain <2/10.(Progressing, not met)  - The patient will perform SLS for >30s to improve his stability and balance with gait. (Progressing, not met)    Plan   Discharge from physical therapy    Mp Grady, PT

## 2019-12-10 ENCOUNTER — LAB VISIT (OUTPATIENT)
Dept: LAB | Facility: HOSPITAL | Age: 79
End: 2019-12-10
Attending: UROLOGY
Payer: MEDICARE

## 2019-12-10 DIAGNOSIS — C61 PROSTATE CANCER: ICD-10-CM

## 2019-12-10 LAB — COMPLEXED PSA SERPL-MCNC: 0.14 NG/ML (ref 0–4)

## 2019-12-10 PROCEDURE — 84153 ASSAY OF PSA TOTAL: CPT

## 2019-12-10 PROCEDURE — 36415 COLL VENOUS BLD VENIPUNCTURE: CPT | Mod: PO

## 2019-12-17 ENCOUNTER — OFFICE VISIT (OUTPATIENT)
Dept: UROLOGY | Facility: CLINIC | Age: 79
End: 2019-12-17
Payer: MEDICARE

## 2019-12-17 VITALS
HEART RATE: 66 BPM | WEIGHT: 240.06 LBS | SYSTOLIC BLOOD PRESSURE: 138 MMHG | DIASTOLIC BLOOD PRESSURE: 79 MMHG | BODY MASS INDEX: 34.37 KG/M2 | HEIGHT: 70 IN

## 2019-12-17 DIAGNOSIS — C61 PROSTATE CANCER: Primary | ICD-10-CM

## 2019-12-17 LAB
BILIRUB SERPL-MCNC: NORMAL MG/DL
BLOOD URINE, POC: NORMAL
COLOR, POC UA: YELLOW
GLUCOSE UR QL STRIP: NORMAL
KETONES UR QL STRIP: NORMAL
LEUKOCYTE ESTERASE URINE, POC: NORMAL
NITRITE, POC UA: NORMAL
PH, POC UA: 6
PROTEIN, POC: NORMAL
SPECIFIC GRAVITY, POC UA: 1.02
UROBILINOGEN, POC UA: NORMAL

## 2019-12-17 PROCEDURE — 99999 PR PBB SHADOW E&M-EST. PATIENT-LVL III: CPT | Mod: PBBFAC,,, | Performed by: UROLOGY

## 2019-12-17 PROCEDURE — 99214 PR OFFICE/OUTPT VISIT, EST, LEVL IV, 30-39 MIN: ICD-10-PCS | Mod: 25,S$GLB,, | Performed by: UROLOGY

## 2019-12-17 PROCEDURE — 1101F PR PT FALLS ASSESS DOC 0-1 FALLS W/OUT INJ PAST YR: ICD-10-PCS | Mod: CPTII,S$GLB,, | Performed by: UROLOGY

## 2019-12-17 PROCEDURE — 1159F PR MEDICATION LIST DOCUMENTED IN MEDICAL RECORD: ICD-10-PCS | Mod: S$GLB,,, | Performed by: UROLOGY

## 2019-12-17 PROCEDURE — 99214 OFFICE O/P EST MOD 30 MIN: CPT | Mod: 25,S$GLB,, | Performed by: UROLOGY

## 2019-12-17 PROCEDURE — 99999 PR PBB SHADOW E&M-EST. PATIENT-LVL III: ICD-10-PCS | Mod: PBBFAC,,, | Performed by: UROLOGY

## 2019-12-17 PROCEDURE — 1159F MED LIST DOCD IN RCRD: CPT | Mod: S$GLB,,, | Performed by: UROLOGY

## 2019-12-17 PROCEDURE — 81002 URINALYSIS NONAUTO W/O SCOPE: CPT | Mod: S$GLB,,, | Performed by: UROLOGY

## 2019-12-17 PROCEDURE — 81002 POCT URINE DIPSTICK WITHOUT MICROSCOPE: ICD-10-PCS | Mod: S$GLB,,, | Performed by: UROLOGY

## 2019-12-17 PROCEDURE — 1101F PT FALLS ASSESS-DOCD LE1/YR: CPT | Mod: CPTII,S$GLB,, | Performed by: UROLOGY

## 2019-12-17 NOTE — PROGRESS NOTES
[facility] Note - [place]  Staff: MD Janis    Referring provider and please cc: Polo and   PCP:  (leaving), changing to [facility]    Chief Complaint: prostate cancer    Subjective:        HPI: Ramon Reilly is a 79 y.o. male presents with     Prostate cancer, [id number], psa 11.2, Gl 3+3 s/p XRT and ADT x 6 months, bph   -started on flomax 0.4mg by  in January for weak stream, nocturia 3-4x a night which improved with flomax 0.4mg. pvr by bladder scan: 63cc. cysto trus biopsy. trus volume was 55g.  Uroflow results (date: 07/09/2018) on flomax 0.4mg  :Average flowrate: 8.0mL/s,   Voided volume: 163 mL,  Pvr by bladder scan: 208.  Uroflow results (date: 07/12/2018) on flomax 0.8mg nightly   : Average flowrate: 4.0mL/s, Voided volume: 128 mL,  Pvr by bladder scan: 76ml. I had increased him from 0.4 to 0.8 for persistent slow stream but his sx improved and he was dropped back down to 0.4. Good urine stream. Nocturia unchanged. Stopped at last visit on 12/4/18.   -also found to have an elevated psa 9.7 1/30/18. LIZZIE revealed nodular prostate and referred to  who saw him on 3/28/18 and ordered an MRI. Seen by me for the first time 5/29/18 to discuss MRI and a LIZZIE by me revealed a 1cm prostate nodule. A  ctu done for  6/1/18 multiple serosal liver lesions and anterior abdominal wall lesions and 5mm non-obstructing rmp stone. Cysto trus biopsy on 6/4/18 showed 55g prostate and 10/17 cores + for Gl 3+3, all on right. After serosal lesion workup negative proceeded with prostate cancer radiation treatment. See below for psa history and treatment    Saw  5/13/19, plan was for 1 year f/u .last visit c/o Nocturia 3-4x a night now down to 1x anight. On flomax.fatigue.  Ct 6/17/19 which showed serosal implants disappeared and 5mm stone in RMP. kub 9/4/19  showed stones in R kidney. psa rising.  No back pain.    Interval history:    Still fatigued. occ queasy with eating. Having constipation (not on regular bowel regimen). Last colonoscopy 6/21/18.seen by pcp,  LfTs normal 9/18/19, cbc 14.3/42. Started on PT  psa was 0.12 9/4/19 and has now risen to 0.14 3 months later.   Emptying improved on flomax 0.4mg   LIZZIE today: no nodules (previously had 1cm nodule on R prior to tx). 40g prostate. Weak sphincter tone.       psa history  12/10/19 0.14, pvr by scan: 56, LIZZIE : no nodules (previously had 1cm nodule on R prior to tx). 40g prostate. Weak sphincter tone.   9/4/19  0.12, pvr by scan: 115cc  5/31/19 0.10, T 6/17/19 336  3/4/19  0.04  11/28/18 0.09  10/19/18 0.2  8/18-10/18 radiotherapy to 7740 cGy ending October 2018.  7/30/18 Gold markers  7/12/18 UF: avg flow 4mL/s, void:163, pvr by scan: 208  7/10/18 trelstar 22.5mg, on vit D and calcium   6/4/18  trus vol: 55g,  bx:Gl 3+3, 10/17 cores + for Gl 3+3, all on right  5/29/18 11.2, LIZZIE: 1cm right sided prostate nodule   4/25/18 MRI: Pirads 4 posteriormedial to PZ, midgland to apex on the right   1/30/18 9.7    UA today: neg  Urine history:  9/11/19 No cx, void: neg  6/4/19  No cx, void: neg  12/4/18 Clinic poct: tr protein/tr blood, aut: neg  7/26/18 Ng, void: neg  5/29/18 Ng, void: neg, cytology: negative  3/19/18 No cx, void: neg  3/4/18  No cx, void: neg      REVIEW OF SYSTEMS:  General ROS: no fevers, no chills  Psychological ROS: no depression  Endocrine ROS: no heat or cold  Respiratory ROS: no SOB  Cardiovascular ROS: no CP  Gastrointestinal ROS: no abdominal pain, no constipation, no diarrhea, no BRBPR  Musculoskeletal ROS: no muscle pain  Neurological ROS: no headaches  Dermatological ROS: no rashes  HEENT: +glasses, no sinus   ROS: per HPI     PMHx:  Past Medical History:   Diagnosis Date    BPH (benign prostatic hyperplasia)     Cancer     prostate    Wears glasses      Kidney stones: No  Cataracts? Beginning      PSHx:  Past Surgical History:   Procedure Laterality Date     BIOPSY OF ABDOMINAL WALL N/A 2018    Procedure: BIOPSY, ABDOMINAL WALL;  Surgeon: Corky Paiz MD;  Location: [facility] OR;  Service: General;  Laterality: N/A;    CHOLECYSTECTOMY      COLONOSCOPY N/A 2018    Procedure: COLONOSCOPY;  Surgeon: Sally Aggarwal MD;  Location: [facility] ENDO;  Service: Endoscopy;  Laterality: N/A;    CYSTOSCOPY N/A 2018    Procedure: CYSTOSCOPY;  Surgeon: Christie Bruce MD;  Location: [facility] OR;  Service: Urology;  Laterality: N/A;    DIAGNOSTIC LAPAROSCOPY N/A 2018    Procedure: LAPAROSCOPY, DIAGNOSTIC;  Surgeon: Corky Paiz MD;  Location: [facility] OR;  Service: General;  Laterality: N/A;  diagnostic lap, biopsy of abdominal wall mass    EYE SURGERY Right     eye lid surgery     HERNIA REPAIR      TONSILLECTOMY, ADENOIDECTOMY      TRANSRECTAL BIOPSY OF PROSTATE WITH ULTRASOUND GUIDANCE N/A 2018    Procedure: TRANSRECTAL ULTRASOUND BIOPSY;  Surgeon: Christie Bruce MD;  Location: [facility] OR;  Service: Urology;  Laterality: N/A;    TRANSRECTAL ULTRASOUND EXAMINATION N/A 2018    Procedure: GOLD MARKERS-;  Surgeon: Christie Bruce MD;  Location: [facility] OR;  Service: Urology;  Laterality: N/A;  TRUS GOLD MARKERS      WISDOM TOOTH EXTRACTION     umbilical hernia repair with recurrence    Stents/Valves/Foreign Bodies: Yes   Cardiac Evaluation: No    Screening Studies  Colonoscopy: none     Fam Hx:   malignancies: No  . Gyn malignancies: mother had breast cancer. Mother  a 72 of breast cancer. Father  a 77 of MI  kidney stones: No     Soc Hx:  Former tobacco, quit 1980s.  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    Urologic Medications:flomax 0.4mg nightly  Anticoagulation: Yes - asa 81mg daily     Objective:     Vitals:    19 1500   BP:  138/79   Pulse: 66       General:WDWN in NAD  Eyes: PERRLA, normal conjunctiva  Respiratory: No increased work on breathing.   Cardiovascular: No obvious extremity edema. Warm and well perfused.   GI: palpation of masses. No tenderness. No hepatosplenomegaly to palpation.  Musculoskeletal: normal range of motion of bilateral upper extremities. Normal muscle strength and tone.  Skin: no obvious rashes or lesions. No tightening of skin noted.  Neurologic: CN grossly normal. Normal sensation.   Psychiatric: awake, alert and oriented x 3. Mood and affect normal. Cooperative.     5/2018:  Inspection of anus normal  No scrotal rashes, cysts or lesions  Epididymis normal in size, no tenderness  Testes normal and size, equal size bilaterally, no masses  Urethral meatus normal without discharge  Penis is circumcised,    LIZZIE: 40g gland 1cm prostate nodule right side mid gland towards apex, tenderness. SV not palpable. Normal sphincter tone. No hemhorroids.  No bilateral inguinal hernias noted     LIZZIE as above      LABS REVIEW:  BMP  Lab Results   Component Value Date     09/18/2019    K 4.5 09/18/2019     09/18/2019    CO2 27 09/18/2019    BUN 21 09/18/2019    CREATININE 1.0 09/18/2019    CALCIUM 10.0 09/18/2019    ANIONGAP 9 09/18/2019    ESTGFRAFRICA >60.0 09/18/2019    EGFRNONAA >60.0 09/18/2019     Lab Results   Component Value Date    WBC 7.58 09/18/2019    HGB 14.3 09/18/2019    HCT 42.0 09/18/2019    MCV 90 09/18/2019     09/18/2019       PATHOLOGY REVIEW:   PERITONEAL MASS, RIGHT UPPER QUADRANT, BIOPSIES 7/31/18:  -Focal benign liver parenchyma with acute and chronic inflammation, surrounding granulation tissue with marked  acute and chronic inflammation and associated fibrosis (see comment).  -No evidence of malignancy.  COMMENT: This patient underwent a cholecystectomy on 3-. The histopathologic findings seen in this  specimen are consistent with an inflamed surgical site at the liver  bed. An infectious process cannot be excluded.  Close clinical follow-up and clinical correlation are recommended.    trus biopsy 6/4/18:   PROSTATE BIOPSIES 5., 6., 7., 8., 11., 12., AND 13.:  NO CARCINOMA IDENTIFIED  BIOPSIES OF 1. RIGHT LATERAL BASE, 2. RIGHT BASE MEDIAL, 3. RIGHT MID LATERAL, 4. RIGHT MID  MEDIAL, 9. RIGHT APEX MEDIAL, 10. RIGHT APEX LATERAL, 14. RIGHT TRANSITION ZONE, AND 15.  RIGHT APICAL NODULE: ADENOCARCINOMA TOTAL MORRIS SCORE 6 (3+ 3)    Adenocarcinoma involves 30% of specimen 1, 65% of 2, 60% of 3, 60% of 4 (with involvement of both cores), 60% of 9 (involvement of all 3 cores), 80% of 10, 15% of 14, and 75% of 15. The pattern is that of Morris grade 3. The verysmall tissue fragments grossly described are interpreted as being adjacent connective tissue, as opposed to reflectingadditional core biopsies.    Urine cytology 5/31/18: Negative for malignant cells.    RADIOGRAPHIC REVIEW:  kub 9/4/19  Nonobstructive bowel gas pattern.  Moderate degree of stool in the colon as can be seen with constipation.  Cholecystectomy clips.  5 mm calcification right mid abdomen L2 level.  Multilevel spinal degenerative changes.  Dextroscoliosis lumbar spine.  Several metallic markers along the midline pelvis likely within the prostate    ctap 6/17/19  Peritoneal metastatic implants along the serosal surface of the right lateral aspect of the liver which were visible on the previous study of are no longer present.  No new peritoneal implants are evident on today's examination.  The liver, spleen, pancreas, and adrenal glands are normal in size and appearance.  A 5 mm calculus is again noted in the mid right kidney which is unchanged.  The kidneys are otherwise unremarkable.  The gallbladder is surgically absent and the bile ducts are not dilated.  No lymph node enlargement, ascites, or inflammatory changes are evident in the abdomen or pelvis.  Metallic markers have been placed at the prostate gland.  The  seminal vesicles are unremarkable.  The bladder is unremarkable.  A small hiatal hernia is again noted.     11/2/18 pet for abdominal lesions  No visceromegaly, visceral or peritoneal masses or ascites seen.  There are no abnormal foci of hypermetabolism in the previously seen  perihepatic foci of hypermetabolism has regressed and no longer hypermetabolic.    ctu 6/1/18 done for gross hematuria  5mm RMP stone. No renal masses, no hydro  subcm likely cysts on both kidneys  Prostate enlarged with diffuse bladder wall thickening  Small nodular serosal implants on the live, anterior abdominal wall suspicious for metastatic dz  Questionable transverse colon mass     MRI of the prostate with and without contrast at [facility] (4/25/18). Volume: 63g. PZ: pirads 4 posterior to posteromedial PZ of the midgland to apex on the right with epicenter in the midgland. No gross macrocopic EPe noted but does bulge the capsule.      Assessment:       1. Prostate cancer          Plan:     Prostate Cancer, [id number]+3, Intermediate Risk, BPH with increasing weakness of stream, Nocturia  · Continue flomax 0.8mg nightly, emptying better.  · Still has nocturia, but that's multifactorial   · psa rising. Failure considered since has risen 3 x since andrea which was 0.04  · psa velocity remains about the same   · imaging (bone scan and ct scan to look for metastasis) would likely not help with such a low psa (low sensitivity). LIZZIE did not reveal any obvious nodules. Not sure that MRI  Would change anything because could not have any more radiation. Had a ct 6/2019  · Sensitive PEt considered if avail and could gear us towards ADT sooner.   · Pt fatigued all the time, workup 9/2019 by pcp did not reveal any obvious causes. He wants to avoid ADT if possible.   · For constipation continue metamucil and then add 2 fiber gummies and gummy probiotic a day.     For now will repeat psa in 3 months and if rising then +/- bone scan and or repeat ct scan  and start ADT .            Christie Bruce MD

## 2019-12-17 NOTE — Clinical Note
Do you know if they offer that super sensitive pet ([facility]?) to see if there is area for recurrent cacner. Still fatiued. Doesn't want to start adt

## 2020-03-10 ENCOUNTER — LAB VISIT (OUTPATIENT)
Dept: LAB | Facility: HOSPITAL | Age: 80
End: 2020-03-10
Attending: UROLOGY
Payer: MEDICARE

## 2020-03-10 DIAGNOSIS — C61 PROSTATE CANCER: ICD-10-CM

## 2020-03-10 LAB — COMPLEXED PSA SERPL-MCNC: 0.1 NG/ML (ref 0–4)

## 2020-03-10 PROCEDURE — 36415 COLL VENOUS BLD VENIPUNCTURE: CPT | Mod: PO

## 2020-03-10 PROCEDURE — 84153 ASSAY OF PSA TOTAL: CPT

## 2020-03-16 ENCOUNTER — TELEPHONE (OUTPATIENT)
Dept: UROLOGY | Facility: CLINIC | Age: 80
End: 2020-03-16

## 2020-03-16 DIAGNOSIS — C61 PROSTATE CANCER: Primary | ICD-10-CM

## 2020-03-16 NOTE — TELEPHONE ENCOUNTER
psa not rising since last psa. Now 0.10    In light of COVID-19 and need to reschedule non-emergent visits and procedures, would like to reschedule psa diagnostic in 3 months and f/u after     If patient would like to discuss anything further, please encourage them to sign up for telehealth and make appointment for them with me via this pathway

## 2020-03-16 NOTE — TELEPHONE ENCOUNTER
Spoke with patient informed patient of results and recommendations. Patient verbally voiced understanding. Patient will reschedule for 3 months and psa lab prior

## 2020-05-13 ENCOUNTER — OFFICE VISIT (OUTPATIENT)
Dept: RADIATION ONCOLOGY | Facility: CLINIC | Age: 80
End: 2020-05-13
Payer: MEDICARE

## 2020-05-13 DIAGNOSIS — C61 ADENOCARCINOMA OF PROSTATE: Primary | ICD-10-CM

## 2020-05-13 PROCEDURE — 99441 PR PHYSICIAN TELEPHONE EVALUATION 5-10 MIN: ICD-10-PCS | Mod: 95,,, | Performed by: RADIOLOGY

## 2020-05-13 PROCEDURE — 99441 PR PHYSICIAN TELEPHONE EVALUATION 5-10 MIN: CPT | Mod: 95,,, | Performed by: RADIOLOGY

## 2020-05-13 NOTE — PROGRESS NOTES
Ramon Reilly  [id number]  1940  5/13/2020  No referring provider defined for this encounter.    DIAGNOSIS: Cancer Staging  Adenocarcinoma of prostate  Staging form: Prostate, AJCC 8th Edition  - Clinical: cT2b, cN0, PSA: 11.2, Grade Group: 1 - Signed by Jose Antonio Che Jr., MD on 8/7/2018    REASON FOR VISIT: Routine scheduled follow-up.    The patient location is:  home  Visit type: Virtual visit with audio ONLY  The reason for the audio only service rather than synchronous audio and video virtual visit was related to technical difficulties or patient preference/necessity.    HISTORY OF PRESENT ILLNESS:   79M; on Flomax 2/2 LUTS x 6mos    PSA  2/18 9.7  5/18 11.2    *3/18 US ABD  1. Cholelithiasis.  2. Hepatomegaly and diffuse fatty infiltration of the liver.  *4/18 MRI P  PIRADS-4; posteriormedial to PZ, midgland to apex on the right  *6/18 CT UROGRAM  -Findings consistent with serosal implants on the liver and implant anterior abdominal wall and metastatic disease.  Questionable mass versus incomplete distention transverse colon.  Recommend further evaluation/workup.  -Additional findings as detailed above including enlarged prostate gland, diffuse bladder wall thickening, small hiatal hernia.  Right renal stone  *6/18 C-scope  1. Cecal polyp:  Tubular adenoma with no evidence of high-grade dysplasia.  2. Descending polyp:  Fragmented tubulovillous adenoma with no evidence of high-grade dysplasia.  *Dr. Bruce TRUS bx   - 3+3 adenoca (high volume) @ RIGHT LATERAL BASE, RIGHT BASE MEDIAL, RIGHT MID LATERAL, RIGHT MID MEDIAL, RIGHT APEX MEDIAL, RIGHT APEX LATERAL, RIGHT  TRANSITION ZONE, AND RIGHT APICAL NODULE   - 10/19 cores   - palpable 1cm nodule; recommend RT given age; ADT started  *Dr. Schwartz: liver bx negative    Patient completed definitive radiotherapy with concurrent short-term ADT to a total dose of 7740 cGy ending October 2018. Treatment was well tolerated.    INTERVAL HISTORY:   Patient  reports continued nocturia.  He takes Flomax 0.8 mg q.h.s..  He practices fluid restriction habits.  He denies diarrhea or bright red blood per rectum.  He denies dysuria, hematuria, bone pain.  He has generalized fatigue.  He attributes this to old age.    Review of Systems   Constitutional: Positive for fatigue. Negative for appetite change, chills, fever and unexpected weight change.   HENT:   Negative for lump/mass, mouth sores, sore throat, trouble swallowing and voice change.    Eyes: Negative for eye problems and icterus.   Respiratory: Negative for chest tightness, cough, hemoptysis and shortness of breath.    Cardiovascular: Negative for chest pain and leg swelling.   Gastrointestinal: Negative for abdominal distention, abdominal pain, blood in stool, constipation, diarrhea, nausea and vomiting.   Genitourinary: Positive for nocturia. Negative for bladder incontinence, difficulty urinating, dysuria, frequency and hematuria.    Musculoskeletal: Negative for back pain, gait problem, neck pain and neck stiffness.   Neurological: Negative for extremity weakness, gait problem, headaches, numbness and seizures.   Hematological: Negative for adenopathy.     Past Medical History:   Diagnosis Date    BPH (benign prostatic hyperplasia)     Cancer     prostate    Wears glasses      Past Surgical History:   Procedure Laterality Date    BIOPSY OF ABDOMINAL WALL N/A 7/31/2018    Procedure: BIOPSY, ABDOMINAL WALL;  Surgeon: Corky Paiz MD;  Location: [facility] OR;  Service: General;  Laterality: N/A;    CHOLECYSTECTOMY      COLONOSCOPY N/A 6/21/2018    Procedure: COLONOSCOPY;  Surgeon: Sally Aggarwal MD;  [facility];  Service: Endoscopy;  Laterality: N/A;    CYSTOSCOPY N/A 6/4/2018    Procedure: CYSTOSCOPY;  Surgeon: Christie Bruce MD;  Location[facility];  Service: Urology;  Laterality: N/A;    DIAGNOSTIC LAPAROSCOPY N/A 7/31/2018    Procedure: LAPAROSCOPY, DIAGNOSTIC;  Surgeon: Corky Paiz  MD;  Location: [facility] OR;  Service: General;  Laterality: N/A;  diagnostic lap, biopsy of abdominal wall mass    EYE SURGERY Right     eye lid surgery     HERNIA REPAIR      TONSILLECTOMY, ADENOIDECTOMY      TRANSRECTAL BIOPSY OF PROSTATE WITH ULTRASOUND GUIDANCE N/A 2018    Procedure: TRANSRECTAL ULTRASOUND BIOPSY;  Surgeon: Christie Bruce MD;  Location: [facility] OR;  Service: Urology;  Laterality: N/A;    TRANSRECTAL ULTRASOUND EXAMINATION N/A 2018    Procedure: GOLD MARKERS-;  Surgeon: Christie Bruce MD;  Location: [facility] OR;  Service: Urology;  Laterality: N/A;  TRUS GOLD MARKERS      WISDOM TOOTH EXTRACTION       Social History     Socioeconomic History    Marital status:      Spouse name: Not on file    Number of children: Not on file    Years of education: Not on file    Highest education level: Not on file   Occupational History    Not on file   Social Needs    Financial resource strain: Not hard at all    Food insecurity:     Worry: Never true     Inability: Never true    Transportation needs:     Medical: No     Non-medical: No   Tobacco Use    Smoking status: Former Smoker     Last attempt to quit: 3/12/1988     Years since quittin.1    Smokeless tobacco: Never Used   Substance and Sexual Activity    Alcohol use: Yes     Frequency: Never     Binge frequency: Never     Comment: social, at weddings    Drug use: No    Sexual activity: Never   Lifestyle    Physical activity:     Days per week: 3 days     Minutes per session: 10 min    Stress: Only a little   Relationships    Social connections:     Talks on phone: Three times a week     Gets together: Once a week     Attends Muslim service: Not on file     Active member of club or organization: Yes     Attends meetings of clubs or organizations: 1 to 4 times per year     Relationship status:    Other Topics Concern    Not on file   Social History Narrative    Not on file     Family History    Problem Relation Age of Onset    Eczema Neg Hx     Lupus Neg Hx     Psoriasis Neg Hx     Melanoma Neg Hx      Medication List with Changes/Refills   Current Medications    ASPIRIN (ECOTRIN) 81 MG EC TABLET    Take 81 mg by mouth once daily.    CALCIUM CARBONATE (OS-IDA) 500 MG CALCIUM (1,250 MG) TABLET    Take 1 tablet (500 mg total) by mouth once daily.    CHOLECALCIFEROL, VITAMIN D3, (VITAMIN D3) 1,000 UNIT CAPSULE    Take 1,000 Units by mouth once daily.    MULTIVITAMIN CAPSULE    Take 1 capsule by mouth once daily.    TAMSULOSIN (FLOMAX) 0.4 MG CAP    Take 2 capsules (0.8 mg total) by mouth once daily. Take at bedtime     Review of patient's allergies indicates:  No Known Allergies    QUALITY OF LIFE: Unknown    There were no vitals filed for this visit.  There is no height or weight on file to calculate BMI.    PHYSICAL EXAM:   (telemed visit)    ANCILLARY DATA:    2mo ago  (3/10/20) 5mo ago  (12/10/19) 8mo ago  (9/4/19) 11mo ago  (5/31/19) 1yr ago  (3/4/19) 1yr ago  (11/28/18) 1yr ago  (10/19/18)    PSA DIAGNOSTIC 0.00 - 4.00 ng/mL 0.10  0.14 CM 0.12 CM 0.10 CM 0.04 CM 0.09 CM 0.2 R, CM         ASSESSMENT: 79 y.o. male with Intermediate risk prostate adenocarcinoma, [id number] GS 3+3 (high volume) iPSA 11.2 status post short-term hormone deprivation therapy and radiotherapy to 7740 cGy ending October 2018.  PLAN:   Ramon Reilly completed definitive radiotherapy for favorable intermediate risk prostate adenocarcinoma, [place] 3 + 3 disease.  He was treated with short-term hormone deprivation which significantly hindered his quality of life.  His PSA has responded nicely, now off of hormone deprivation at 0.1 which is a comfortable andrea.  This sets his Phoenix definition of failure at 2.10 and given the morbidity associated with hormone deprivation in this patient, I do not recommend re-initiation should we see a slight PSA climb on further surveillance.  Patient is in agreement and is not inclined  to further hormone deprivation.  Will continue to monitor PSA trends.  Return to clinic in 1 year.  Continue follow with Dr. Bruce.    All questions answered and contact information provided. Patient understands free to call us anytime with any questions or concerns regarding radiation therapy.    I have personally seen and evaluated this patient. Greater than 50% of this time was spent discussing coordination of care and/or counseling.    Total time spent with patient: 5min    Each patient to whom he or she provides medical services by telemedicine is:  (1) informed of the relationship between the physician and patient and the respective role of any other health care provider with respect to management of the patient; and (2) notified that he or she may decline to receive medical services by telemedicine and may withdraw from such care at any time. Patient verbally consented to receive this service via voice-only telephone call.    This service was not originating from a related E/M service provided within the previous 7 days nor will  to an E/M service or procedure within the next 24 hours or my soonest available appointment.  Prevailing standard of care was able to be met in this audio-only visit.      COVID-19 precautions discussed.    PHYSICIAN: Jose Antonio Che Jr, MD

## 2020-07-21 ENCOUNTER — LAB VISIT (OUTPATIENT)
Dept: LAB | Facility: HOSPITAL | Age: 80
End: 2020-07-21
Attending: UROLOGY
Payer: MEDICARE

## 2020-07-21 DIAGNOSIS — C61 PROSTATE CANCER: ICD-10-CM

## 2020-07-21 LAB — COMPLEXED PSA SERPL-MCNC: 0.11 NG/ML (ref 0–4)

## 2020-07-21 PROCEDURE — 36415 COLL VENOUS BLD VENIPUNCTURE: CPT | Mod: PO

## 2020-07-21 PROCEDURE — 84153 ASSAY OF PSA TOTAL: CPT

## 2020-07-27 ENCOUNTER — OFFICE VISIT (OUTPATIENT)
Dept: UROLOGY | Facility: CLINIC | Age: 80
End: 2020-07-27
Payer: MEDICARE

## 2020-07-27 ENCOUNTER — TELEPHONE (OUTPATIENT)
Dept: UROLOGY | Facility: CLINIC | Age: 80
End: 2020-07-27

## 2020-07-27 VITALS
HEIGHT: 70 IN | BODY MASS INDEX: 34.09 KG/M2 | WEIGHT: 238.13 LBS | DIASTOLIC BLOOD PRESSURE: 87 MMHG | HEART RATE: 73 BPM | SYSTOLIC BLOOD PRESSURE: 137 MMHG

## 2020-07-27 DIAGNOSIS — R35.1 NOCTURIA: ICD-10-CM

## 2020-07-27 DIAGNOSIS — C61 PROSTATE CANCER: Primary | ICD-10-CM

## 2020-07-27 DIAGNOSIS — N40.0 BENIGN PROSTATIC HYPERPLASIA, UNSPECIFIED WHETHER LOWER URINARY TRACT SYMPTOMS PRESENT: ICD-10-CM

## 2020-07-27 LAB
BILIRUB SERPL-MCNC: NORMAL MG/DL
BLOOD URINE, POC: NORMAL
CLARITY, POC UA: CLEAR
COLOR, POC UA: YELLOW
GLUCOSE UR QL STRIP: NORMAL
KETONES UR QL STRIP: NORMAL
LEUKOCYTE ESTERASE URINE, POC: NORMAL
NITRITE, POC UA: NORMAL
PH, POC UA: 5
PROTEIN, POC: NORMAL
SPECIFIC GRAVITY, POC UA: 1.01
UROBILINOGEN, POC UA: NORMAL

## 2020-07-27 PROCEDURE — 1159F MED LIST DOCD IN RCRD: CPT | Mod: S$GLB,,, | Performed by: UROLOGY

## 2020-07-27 PROCEDURE — 81002 URINALYSIS NONAUTO W/O SCOPE: CPT | Mod: S$GLB,,, | Performed by: UROLOGY

## 2020-07-27 PROCEDURE — 1159F PR MEDICATION LIST DOCUMENTED IN MEDICAL RECORD: ICD-10-PCS | Mod: S$GLB,,, | Performed by: UROLOGY

## 2020-07-27 PROCEDURE — 99214 OFFICE O/P EST MOD 30 MIN: CPT | Mod: 25,S$GLB,, | Performed by: UROLOGY

## 2020-07-27 PROCEDURE — 99999 PR PBB SHADOW E&M-EST. PATIENT-LVL III: CPT | Mod: PBBFAC,,, | Performed by: UROLOGY

## 2020-07-27 PROCEDURE — 1101F PT FALLS ASSESS-DOCD LE1/YR: CPT | Mod: CPTII,S$GLB,, | Performed by: UROLOGY

## 2020-07-27 PROCEDURE — 99999 PR PBB SHADOW E&M-EST. PATIENT-LVL III: ICD-10-PCS | Mod: PBBFAC,,, | Performed by: UROLOGY

## 2020-07-27 PROCEDURE — 99214 PR OFFICE/OUTPT VISIT, EST, LEVL IV, 30-39 MIN: ICD-10-PCS | Mod: 25,S$GLB,, | Performed by: UROLOGY

## 2020-07-27 PROCEDURE — 81002 POCT URINE DIPSTICK WITHOUT MICROSCOPE: ICD-10-PCS | Mod: S$GLB,,, | Performed by: UROLOGY

## 2020-07-27 PROCEDURE — 1101F PR PT FALLS ASSESS DOC 0-1 FALLS W/OUT INJ PAST YR: ICD-10-PCS | Mod: CPTII,S$GLB,, | Performed by: UROLOGY

## 2020-07-27 RX ORDER — TAMSULOSIN HYDROCHLORIDE 0.4 MG/1
0.8 CAPSULE ORAL DAILY
Qty: 180 CAPSULE | Refills: 3 | Status: SHIPPED | OUTPATIENT
Start: 2020-07-27 | End: 2020-09-22 | Stop reason: SDUPTHER

## 2020-07-27 RX ORDER — PREDNISOLONE ACETATE 10 MG/ML
SUSPENSION/ DROPS OPHTHALMIC
COMMUNITY
Start: 2019-05-02 | End: 2021-02-01 | Stop reason: ALTCHOICE

## 2020-07-27 NOTE — PROGRESS NOTES
[facility] Note - [place]  Staff: MD Janis  PCP: Amadeo Delatorre DO        Chief Complaint: prostate cancer    Subjective:        HPI: Ramon Reilly is a 79 y.o. male presents with     Prostate cancer, [id number], psa 11.2, Gl 3+3 s/p XRT and ADT x 6 months, bph   -started on flomax 0.4mg by  in January for weak stream, nocturia 3-4x a night which improved with flomax 0.4mg. pvr by bladder scan: 63cc. cysto trus biopsy. trus volume was 55g.  Uroflow results (date: 07/09/2018) on flomax 0.4mg  :Average flowrate: 8.0mL/s,   Voided volume: 163 mL,  Pvr by bladder scan: 208.  Uroflow results (date: 07/12/2018) on flomax 0.8mg nightly   : Average flowrate: 4.0mL/s, Voided volume: 128 mL,  Pvr by bladder scan: 76ml. I had increased him from 0.4 to 0.8 for persistent slow stream but his sx improved and he was dropped back down to 0.4. Good urine stream. Nocturia unchanged. Stopped at last visit on 12/4/18.   -also found to have an elevated psa 9.7 1/30/18. LIZZIE revealed nodular prostate and referred to  who saw him on 3/28/18 and ordered an MRI. Seen by me for the first time 5/29/18 to discuss MRI and a LIZZIE by me revealed a 1cm prostate nodule. A  ctu done for  6/1/18 multiple serosal liver lesions and anterior abdominal wall lesions and 5mm non-obstructing rmp stone. Cysto trus biopsy on 6/4/18 showed 55g prostate and 10/17 cores + for Gl 3+3, all on right. After serosal lesion workup negative proceeded with prostate cancer radiation treatment. See below for psa history and treatment    Saw  5/13/19, plan was for 1 year f/u .last visit c/o Nocturia 3-4x a night now down to 1x anight. On flomax.fatigue.  Ct 6/17/19 which showed serosal implants disappeared and 5mm stone in RMP. kub 9/4/19  showed stones in R kidney. psa rising.  No back pain.    Interval history 12/17/19:   Still fatigued. occ queasy with eating. Having constipation (not on regular bowel  regimen). Last colonoscopy 6/21/18.seen by pcp,  LfTs normal 9/18/19, cbc 14.3/42. Started on PT  psa was 0.12 9/4/19 and has now risen to 0.14 3 months later.   Emptying improved on flomax 0.4mg   LIZZIE today: no nodules (previously had 1cm nodule on R prior to tx). 40g prostate. Weak sphincter tone.    Plan: cont'd flomax 0.8, repeat psa in 3 months    Interval history 7/27/20:   psa 0.10 on 3/10/20 and 0.11 7/21/20.  No new back pain. + 5 pound weight loss.    Stream is good during day weak at night. Happy on flomax 0.8mg nightly   Nocturia 1-3x  pvr by scan: 0cc, ua neg      psa history   7/27/20 pvr by scan: 0cc  7/21/20 0.11  3/10/20 0.10  12/10/19 0.14, pvr by scan: 56, LIZZIE : no nodules (previously had 1cm nodule on R prior to tx). 40g prostate. Weak sphincter tone.   9/4/19  0.12, pvr by scan: 115cc  5/31/19 0.10, T 6/17/19 336  3/4/19  0.04  11/28/18 0.09  10/19/18 0.2  8/18-10/18 radiotherapy to 7740 cGy ending October 2018.  7/30/18 Gold markers  7/12/18 UF: avg flow 4mL/s, void:163, pvr by scan: 208  7/10/18 trelstar 22.5mg, on vit D and calcium   6/4/18  trus vol: 55g,  bx:Gl 3+3, 10/17 cores + for Gl 3+3, all on right  5/29/18 11.2, LIZZIE: 1cm right sided prostate nodule   4/25/18 MRI: Pirads 4 posteriormedial to PZ, midgland to apex on the right   1/30/18 9.7    UA today: neg  Urine history:  9/11/19 No cx, void: neg  6/4/19  No cx, void: neg  12/4/18 Clinic poct: tr protein/tr blood, aut: neg  7/26/18 Ng, void: neg  5/29/18 Ng, void: neg, cytology: negative  3/19/18 No cx, void: neg  3/4/18  No cx, void: neg      REVIEW OF SYSTEMS:  General ROS: no fevers, no chills  Psychological ROS: no depression  Endocrine ROS: no heat or cold  Respiratory ROS: no SOB  Cardiovascular ROS: no CP  Gastrointestinal ROS: no abdominal pain, no constipation, no diarrhea, no BRBPR  Musculoskeletal ROS: no muscle pain  Neurological ROS: no headaches  Dermatological ROS: no rashes  HEENT: +glasses, no sinus   ROS: per HPI      PMHx:  Past Medical History:   Diagnosis Date    BPH (benign prostatic hyperplasia)     Cancer     prostate    Wears glasses      Kidney stones: No  Cataracts? Beginning      PSHx:  Past Surgical History:   Procedure Laterality Date    BIOPSY OF ABDOMINAL WALL N/A 2018    Procedure: BIOPSY, ABDOMINAL WALL;  Surgeon: Corky Paiz MD;  Location: [facility] OR;  Service: General;  Laterality: N/A;    CHOLECYSTECTOMY      COLONOSCOPY N/A 2018    Procedure: COLONOSCOPY;  Surgeon: Sally Aggarwal MD;  Location: [facility] ENDO;  Service: Endoscopy;  Laterality: N/A;    CYSTOSCOPY N/A 2018    Procedure: CYSTOSCOPY;  Surgeon: Christie Bruce MD;  Location: [facility] OR;  Service: Urology;  Laterality: N/A;    DIAGNOSTIC LAPAROSCOPY N/A 2018    Procedure: LAPAROSCOPY, DIAGNOSTIC;  Surgeon: Corky Paiz MD;  Location: [facility] OR;  Service: General;  Laterality: N/A;  diagnostic lap, biopsy of abdominal wall mass    EYE SURGERY Right     eye lid surgery     HERNIA REPAIR      TONSILLECTOMY, ADENOIDECTOMY      TRANSRECTAL BIOPSY OF PROSTATE WITH ULTRASOUND GUIDANCE N/A 2018    Procedure: TRANSRECTAL ULTRASOUND BIOPSY;  Surgeon: Christie Bruce MD;  Location: [facility] OR;  Service: Urology;  Laterality: N/A;    TRANSRECTAL ULTRASOUND EXAMINATION N/A 2018    Procedure: GOLD MARKERS-;  Surgeon: Christie Bruce MD;  Location: [facility] OR;  Service: Urology;  Laterality: N/A;  TRUS GOLD MARKERS      WISDOM TOOTH EXTRACTION     umbilical hernia repair with recurrence    Stents/Valves/Foreign Bodies: Yes   Cardiac Evaluation: No    Screening Studies  Colonoscopy: none     Fam Hx:   malignancies: No  . Gyn malignancies: mother had breast cancer. Mother  a 72 of breast cancer. Father  a 77 of MI  kidney stones: No     Soc Hx:  Former tobacco, quit 1980s.  1.5 pk per day x 30 year  occ alcohol  Lives in [place]  :yes here with wife 20 years  Children: wife has  1  Occupation: retired from [employer] and [employer],      Allergies:  Patient has no known allergies.    Urologic Medications:flomax 0.4mg nightly  Anticoagulation: Yes - asa 81mg daily     Objective:     Vitals:    07/27/20 1223   BP: 137/87   Pulse: 73       General:WDWN in NAD  Eyes: PERRLA, normal conjunctiva  Respiratory: No increased work on breathing.   Cardiovascular: No obvious extremity edema. Warm and well perfused.   GI: palpation of masses. No tenderness. No hepatosplenomegaly to palpation.  Musculoskeletal: normal range of motion of bilateral upper extremities. Normal muscle strength and tone.  Skin: no obvious rashes or lesions. No tightening of skin noted.  Neurologic: CN grossly normal. Normal sensation.   Psychiatric: awake, alert and oriented x 3. Mood and affect normal. Cooperative.      LABS REVIEW:  [facility]  Lab Results   Component Value Date     09/18/2019    K 4.5 09/18/2019     09/18/2019    CO2 27 09/18/2019    BUN 21 09/18/2019    CREATININE 1.0 09/18/2019    CALCIUM 10.0 09/18/2019    ANIONGAP 9 09/18/2019    ESTGFRAFRICA >60.0 09/18/2019    EGFRNONAA >60.0 09/18/2019     Lab Results   Component Value Date    WBC 7.58 09/18/2019    HGB 14.3 09/18/2019    HCT 42.0 09/18/2019    MCV 90 09/18/2019     09/18/2019       PATHOLOGY REVIEW:  See hpi for recent     PERITONEAL MASS, RIGHT UPPER QUADRANT, BIOPSIES 7/31/18:  -Focal benign liver parenchyma with acute and chronic inflammation, surrounding granulation tissue with marked  acute and chronic inflammation and associated fibrosis (see comment).  -No evidence of malignancy.  COMMENT: This patient underwent a cholecystectomy on 3-. The histopathologic findings seen in this  specimen are consistent with an inflamed surgical site at the liver bed. An infectious process cannot be excluded.  Close clinical follow-up and clinical correlation are recommended.    trus biopsy 6/4/18:   PROSTATE BIOPSIES 5., 6., 7.,  8., 11., 12., AND 13.:  NO CARCINOMA IDENTIFIED  BIOPSIES OF 1. RIGHT LATERAL BASE, 2. RIGHT BASE MEDIAL, 3. RIGHT MID LATERAL, 4. RIGHT MID  MEDIAL, 9. RIGHT APEX MEDIAL, 10. RIGHT APEX LATERAL, 14. RIGHT TRANSITION ZONE, AND 15.  RIGHT APICAL NODULE: ADENOCARCINOMA TOTAL MORRIS SCORE 6 (3+ 3)    Adenocarcinoma involves 30% of specimen 1, 65% of 2, 60% of 3, 60% of 4 (with involvement of both cores), 60% of 9 (involvement of all 3 cores), 80% of 10, 15% of 14, and 75% of 15. The pattern is that of Morris grade 3. The verysmall tissue fragments grossly described are interpreted as being adjacent connective tissue, as opposed to reflectingadditional core biopsies.    Urine cytology 5/31/18: Negative for malignant cells.    RADIOGRAPHIC REVIEW: See hpi for recent     kub 9/4/19  Nonobstructive bowel gas pattern.  Moderate degree of stool in the colon as can be seen with constipation.  Cholecystectomy clips.  5 mm calcification right mid abdomen L2 level.  Multilevel spinal degenerative changes.  Dextroscoliosis lumbar spine.  Several metallic markers along the midline pelvis likely within the prostate    ctap 6/17/19  Peritoneal metastatic implants along the serosal surface of the right lateral aspect of the liver which were visible on the previous study of are no longer present.  No new peritoneal implants are evident on today's examination.  The liver, spleen, pancreas, and adrenal glands are normal in size and appearance.  A 5 mm calculus is again noted in the mid right kidney which is unchanged.  The kidneys are otherwise unremarkable.  The gallbladder is surgically absent and the bile ducts are not dilated.  No lymph node enlargement, ascites, or inflammatory changes are evident in the abdomen or pelvis.  Metallic markers have been placed at the prostate gland.  The seminal vesicles are unremarkable.  The bladder is unremarkable.  A small hiatal hernia is again noted.     11/2/18 pet for abdominal lesions  No  visceromegaly, visceral or peritoneal masses or ascites seen.  There are no abnormal foci of hypermetabolism in the previously seen  perihepatic foci of hypermetabolism has regressed and no longer hypermetabolic.    ctu 6/1/18 done for gross hematuria  5mm RMP stone. No renal masses, no hydro  subcm likely cysts on both kidneys  Prostate enlarged with diffuse bladder wall thickening  Small nodular serosal implants on the live, anterior abdominal wall suspicious for metastatic dz  Questionable transverse colon mass     MRI of the prostate with and without contrast at [facility] (4/25/18). Volume: 63g. PZ: pirads 4 posterior to posteromedial PZ of the midgland to apex on the right with epicenter in the midgland. No gross macrocopic EPe noted but does bulge the capsule.      Assessment:       1. Prostate cancer    2. Benign prostatic hyperplasia, unspecified whether lower urinary tract symptoms present    3. Nocturia          Plan:     Prostate Cancer, [id number]+3 with 6 month ADT s/p completion of radiation 10/2018 , Intermediate Risk,    psa was rising but then came back down. Now 0.11  Will plan to repeat diagnostic psa in 3 months   Will change to q6 months psa 10/2021 if psa remains the same  psa once yearly starting in 10/2023    flomax 0.8mg nightly refilled for 1 yr. Overall stream not that bothersome.   Explained nocturia multifactorial- fluid in legs, snoring, prostate     F/u in 3 months with dr. Su.moved closer to [place] and wants to f/u there with psa prior.  Will send message to Dr. Su/Isai for follow up     Cc  and Dr.Lemieux Christie Bruce MD

## 2020-07-27 NOTE — PATIENT INSTRUCTIONS
psa was rising but then came back down. Now 0.11  Will plan to repeat diagnostic psa in 3 months   Will change to q6 months psa 10/2021 if psa remains the same  psa once yearly starting in 10/2023    flomax 0.8mg nightly refilled for 1 yr. Overall stream not that bothersome.   Explained nocturia multifactorial- fluid in legs, snoring, prostate     F/u in 3 months with dr. Su.moved closer to [place] and wants to f/u there with psa prior.  Will send message to Dr. Su/Isai for follow up     Cc  and

## 2020-07-27 NOTE — TELEPHONE ENCOUNTER
----- Message from Christie Bruce MD sent at 7/27/2020  1:31 PM CDT -----  Regarding: pt needs 3 onths f/u with . moved to [place]. psa prior ( ip ut in orders)  pt needs 3 months f/u with  for prostate cancer . moved to [place]. psa prior ( ip ut in orders)

## 2020-09-22 RX ORDER — TAMSULOSIN HYDROCHLORIDE 0.4 MG/1
0.8 CAPSULE ORAL DAILY
Qty: 180 CAPSULE | Refills: 3 | Status: SHIPPED | OUTPATIENT
Start: 2020-09-22 | End: 2021-09-16 | Stop reason: SDUPTHER

## 2020-10-05 ENCOUNTER — PATIENT MESSAGE (OUTPATIENT)
Dept: ADMINISTRATIVE | Facility: HOSPITAL | Age: 80
End: 2020-10-05

## 2020-10-27 ENCOUNTER — LAB VISIT (OUTPATIENT)
Dept: LAB | Facility: HOSPITAL | Age: 80
End: 2020-10-27
Attending: UROLOGY
Payer: MEDICARE

## 2020-10-27 DIAGNOSIS — C61 PROSTATE CANCER: ICD-10-CM

## 2020-10-27 LAB — COMPLEXED PSA SERPL-MCNC: 0.14 NG/ML (ref 0–4)

## 2020-10-27 PROCEDURE — 84153 ASSAY OF PSA TOTAL: CPT

## 2020-10-27 PROCEDURE — 36415 COLL VENOUS BLD VENIPUNCTURE: CPT | Mod: PO

## 2020-10-30 ENCOUNTER — OFFICE VISIT (OUTPATIENT)
Dept: UROLOGY | Facility: CLINIC | Age: 80
End: 2020-10-30
Payer: MEDICARE

## 2020-10-30 VITALS — BODY MASS INDEX: 34.09 KG/M2 | HEIGHT: 70 IN | WEIGHT: 238.13 LBS

## 2020-10-30 DIAGNOSIS — Z85.46 HISTORY OF PROSTATE CANCER: Primary | ICD-10-CM

## 2020-10-30 PROCEDURE — 99999 PR PBB SHADOW E&M-EST. PATIENT-LVL III: ICD-10-PCS | Mod: PBBFAC,,, | Performed by: UROLOGY

## 2020-10-30 PROCEDURE — 1101F PR PT FALLS ASSESS DOC 0-1 FALLS W/OUT INJ PAST YR: ICD-10-PCS | Mod: CPTII,S$GLB,, | Performed by: UROLOGY

## 2020-10-30 PROCEDURE — 1126F AMNT PAIN NOTED NONE PRSNT: CPT | Mod: S$GLB,,, | Performed by: UROLOGY

## 2020-10-30 PROCEDURE — 99213 PR OFFICE/OUTPT VISIT, EST, LEVL III, 20-29 MIN: ICD-10-PCS | Mod: S$GLB,,, | Performed by: UROLOGY

## 2020-10-30 PROCEDURE — 1126F PR PAIN SEVERITY QUANTIFIED, NO PAIN PRESENT: ICD-10-PCS | Mod: S$GLB,,, | Performed by: UROLOGY

## 2020-10-30 PROCEDURE — 99999 PR PBB SHADOW E&M-EST. PATIENT-LVL III: CPT | Mod: PBBFAC,,, | Performed by: UROLOGY

## 2020-10-30 PROCEDURE — 1159F PR MEDICATION LIST DOCUMENTED IN MEDICAL RECORD: ICD-10-PCS | Mod: S$GLB,,, | Performed by: UROLOGY

## 2020-10-30 PROCEDURE — 1159F MED LIST DOCD IN RCRD: CPT | Mod: S$GLB,,, | Performed by: UROLOGY

## 2020-10-30 PROCEDURE — 99213 OFFICE O/P EST LOW 20 MIN: CPT | Mod: S$GLB,,, | Performed by: UROLOGY

## 2020-10-30 PROCEDURE — 1101F PT FALLS ASSESS-DOCD LE1/YR: CPT | Mod: CPTII,S$GLB,, | Performed by: UROLOGY

## 2020-10-30 RX ORDER — INFLUENZA A VIRUS A/MICHIGAN/45/2015 X-275 (H1N1) ANTIGEN (FORMALDEHYDE INACTIVATED), INFLUENZA A VIRUS A/SINGAPORE/INFIMH-16-0019/2016 IVR-186 (H3N2) ANTIGEN (FORMALDEHYDE INACTIVATED), INFLUENZA B VIRUS B/PHUKET/3073/2013 ANTIGEN (FORMALDEHYDE INACTIVATED), AND INFLUENZA B VIRUS B/MARYLAND/15/2016 BX-69A ANTIGEN (FORMALDEHYDE INACTIVATED) 60; 60; 60; 60 UG/.7ML; UG/.7ML; UG/.7ML; UG/.7ML
INJECTION, SUSPENSION INTRAMUSCULAR
COMMUNITY
Start: 2020-09-14 | End: 2021-02-01

## 2020-10-30 NOTE — PROGRESS NOTES
Subjective:       Patient ID: Ramon Reilly is a 80 y.o. male.    Chief Complaint: Follow-up (3mo psa)    HPI     80-year-old with a history of prostate cancer.  Clinical stage T2bN0, PSA 11.2.  [place]'s 3+3.  He completed a course of radiation therapy in October 2008.  He also completed 6 months of androgen deprivation therapy.  He is overall doing well.  He does have some obstructive urinary symptoms.  Is currently taking Flomax 0.8 mg daily.  He is overall satisfied with his urinary symptoms.  His PSA is decreased to 0.14.  He had been followed by Dr. Bruce but has relocated to this area.    Review of Systems   Constitutional: Negative for fever.   Genitourinary: Negative for dysuria and hematuria.       Objective:      Physical Exam  Vitals signs reviewed.   Constitutional:       Appearance: He is well-developed.   Pulmonary:      Effort: Pulmonary effort is normal.   Abdominal:      Palpations: Abdomen is soft.   Skin:     Findings: No rash.   Neurological:      Mental Status: He is alert and oriented to person, place, and time.         Assessment:       1. History of prostate cancer        Plan:       History of prostate cancer  -     Prostate Specific Antigen, Diagnostic; Future; Expected date: 05/02/2021      follow-up 6 months with repeat PSA

## 2021-01-04 ENCOUNTER — PATIENT MESSAGE (OUTPATIENT)
Dept: ADMINISTRATIVE | Facility: HOSPITAL | Age: 81
End: 2021-01-04

## 2021-01-22 ENCOUNTER — PATIENT MESSAGE (OUTPATIENT)
Dept: ADMINISTRATIVE | Facility: OTHER | Age: 81
End: 2021-01-22

## 2021-02-01 ENCOUNTER — OFFICE VISIT (OUTPATIENT)
Dept: FAMILY MEDICINE | Facility: CLINIC | Age: 81
End: 2021-02-01
Payer: MEDICARE

## 2021-02-01 VITALS
HEART RATE: 64 BPM | DIASTOLIC BLOOD PRESSURE: 78 MMHG | TEMPERATURE: 98 F | BODY MASS INDEX: 35.32 KG/M2 | HEIGHT: 70 IN | OXYGEN SATURATION: 99 % | SYSTOLIC BLOOD PRESSURE: 132 MMHG | WEIGHT: 246.69 LBS

## 2021-02-01 DIAGNOSIS — Z00.00 ANNUAL PHYSICAL EXAM: ICD-10-CM

## 2021-02-01 DIAGNOSIS — C61 ADENOCARCINOMA OF PROSTATE: Primary | ICD-10-CM

## 2021-02-01 DIAGNOSIS — Z13.220 SCREENING FOR LIPID DISORDERS: ICD-10-CM

## 2021-02-01 DIAGNOSIS — Z13.6 ENCOUNTER FOR SCREENING FOR CARDIOVASCULAR DISORDERS: ICD-10-CM

## 2021-02-01 DIAGNOSIS — L82.1 SEBORRHEIC KERATOSES: ICD-10-CM

## 2021-02-01 DIAGNOSIS — E66.9 CLASS II OBESITY: ICD-10-CM

## 2021-02-01 DIAGNOSIS — C78.7 LIVER METASTASIS: ICD-10-CM

## 2021-02-01 DIAGNOSIS — K59.09 CHRONIC CONSTIPATION: ICD-10-CM

## 2021-02-01 PROBLEM — R19.01 RIGHT UPPER QUADRANT ABDOMINAL MASS: Status: RESOLVED | Noted: 2018-07-31 | Resolved: 2021-02-01

## 2021-02-01 PROBLEM — E66.812 CLASS II OBESITY: Status: ACTIVE | Noted: 2021-02-01

## 2021-02-01 PROBLEM — R53.1 GENERAL WEAKNESS: Status: RESOLVED | Noted: 2018-02-16 | Resolved: 2021-02-01

## 2021-02-01 PROBLEM — R53.1 WEAKNESS: Status: RESOLVED | Noted: 2018-03-05 | Resolved: 2021-02-01

## 2021-02-01 PROCEDURE — 1125F AMNT PAIN NOTED PAIN PRSNT: CPT | Mod: S$GLB,,, | Performed by: INTERNAL MEDICINE

## 2021-02-01 PROCEDURE — 99397 PER PM REEVAL EST PAT 65+ YR: CPT | Mod: S$GLB,,, | Performed by: INTERNAL MEDICINE

## 2021-02-01 PROCEDURE — 1125F PR PAIN SEVERITY QUANTIFIED, PAIN PRESENT: ICD-10-PCS | Mod: S$GLB,,, | Performed by: INTERNAL MEDICINE

## 2021-02-01 PROCEDURE — 3288F FALL RISK ASSESSMENT DOCD: CPT | Mod: CPTII,S$GLB,, | Performed by: INTERNAL MEDICINE

## 2021-02-01 PROCEDURE — 3288F PR FALLS RISK ASSESSMENT DOCUMENTED: ICD-10-PCS | Mod: CPTII,S$GLB,, | Performed by: INTERNAL MEDICINE

## 2021-02-01 PROCEDURE — 99397 PR PREVENTIVE VISIT,EST,65 & OVER: ICD-10-PCS | Mod: S$GLB,,, | Performed by: INTERNAL MEDICINE

## 2021-02-01 PROCEDURE — 99999 PR PBB SHADOW E&M-EST. PATIENT-LVL IV: ICD-10-PCS | Mod: PBBFAC,,, | Performed by: INTERNAL MEDICINE

## 2021-02-01 PROCEDURE — 99999 PR PBB SHADOW E&M-EST. PATIENT-LVL IV: CPT | Mod: PBBFAC,,, | Performed by: INTERNAL MEDICINE

## 2021-02-01 PROCEDURE — 1101F PR PT FALLS ASSESS DOC 0-1 FALLS W/OUT INJ PAST YR: ICD-10-PCS | Mod: CPTII,S$GLB,, | Performed by: INTERNAL MEDICINE

## 2021-02-01 PROCEDURE — 1101F PT FALLS ASSESS-DOCD LE1/YR: CPT | Mod: CPTII,S$GLB,, | Performed by: INTERNAL MEDICINE

## 2021-02-03 ENCOUNTER — LAB VISIT (OUTPATIENT)
Dept: LAB | Facility: HOSPITAL | Age: 81
End: 2021-02-03
Attending: INTERNAL MEDICINE
Payer: MEDICARE

## 2021-02-03 DIAGNOSIS — Z13.220 SCREENING FOR LIPID DISORDERS: ICD-10-CM

## 2021-02-03 DIAGNOSIS — C78.7 LIVER METASTASIS: ICD-10-CM

## 2021-02-03 DIAGNOSIS — C61 ADENOCARCINOMA OF PROSTATE: ICD-10-CM

## 2021-02-03 DIAGNOSIS — Z13.6 ENCOUNTER FOR SCREENING FOR CARDIOVASCULAR DISORDERS: ICD-10-CM

## 2021-02-03 LAB
BASOPHILS # BLD AUTO: 0.04 K/UL (ref 0–0.2)
BASOPHILS NFR BLD: 0.7 % (ref 0–1.9)
DIFFERENTIAL METHOD: ABNORMAL
EOSINOPHIL # BLD AUTO: 0.1 K/UL (ref 0–0.5)
EOSINOPHIL NFR BLD: 1.3 % (ref 0–8)
ERYTHROCYTE [DISTWIDTH] IN BLOOD BY AUTOMATED COUNT: 12.7 % (ref 11.5–14.5)
HCT VFR BLD AUTO: 45.2 % (ref 40–54)
HGB BLD-MCNC: 14.6 G/DL (ref 14–18)
IMM GRANULOCYTES # BLD AUTO: 0.02 K/UL (ref 0–0.04)
IMM GRANULOCYTES NFR BLD AUTO: 0.3 % (ref 0–0.5)
LYMPHOCYTES # BLD AUTO: 1 K/UL (ref 1–4.8)
LYMPHOCYTES NFR BLD: 15.9 % (ref 18–48)
MCH RBC QN AUTO: 30 PG (ref 27–31)
MCHC RBC AUTO-ENTMCNC: 32.3 G/DL (ref 32–36)
MCV RBC AUTO: 93 FL (ref 82–98)
MONOCYTES # BLD AUTO: 0.7 K/UL (ref 0.3–1)
MONOCYTES NFR BLD: 11 % (ref 4–15)
NEUTROPHILS # BLD AUTO: 4.3 K/UL (ref 1.8–7.7)
NEUTROPHILS NFR BLD: 70.8 % (ref 38–73)
NRBC BLD-RTO: 0 /100 WBC
PLATELET # BLD AUTO: 193 K/UL (ref 150–350)
PMV BLD AUTO: 10.7 FL (ref 9.2–12.9)
RBC # BLD AUTO: 4.86 M/UL (ref 4.6–6.2)
WBC # BLD AUTO: 6.11 K/UL (ref 3.9–12.7)

## 2021-02-03 PROCEDURE — 36415 COLL VENOUS BLD VENIPUNCTURE: CPT | Mod: PO

## 2021-02-03 PROCEDURE — 85025 COMPLETE CBC W/AUTO DIFF WBC: CPT

## 2021-02-03 PROCEDURE — 80061 LIPID PANEL: CPT

## 2021-02-03 PROCEDURE — 80053 COMPREHEN METABOLIC PANEL: CPT

## 2021-02-04 LAB
ALBUMIN SERPL BCP-MCNC: 4.2 G/DL (ref 3.5–5.2)
ALP SERPL-CCNC: 66 U/L (ref 55–135)
ALT SERPL W/O P-5'-P-CCNC: 16 U/L (ref 10–44)
ANION GAP SERPL CALC-SCNC: 9 MMOL/L (ref 8–16)
AST SERPL-CCNC: 17 U/L (ref 10–40)
BILIRUB SERPL-MCNC: 0.7 MG/DL (ref 0.1–1)
BUN SERPL-MCNC: 21 MG/DL (ref 8–23)
CALCIUM SERPL-MCNC: 9 MG/DL (ref 8.7–10.5)
CHLORIDE SERPL-SCNC: 104 MMOL/L (ref 95–110)
CHOLEST SERPL-MCNC: 192 MG/DL (ref 120–199)
CHOLEST/HDLC SERPL: 4.3 {RATIO} (ref 2–5)
CO2 SERPL-SCNC: 27 MMOL/L (ref 23–29)
CREAT SERPL-MCNC: 1.1 MG/DL (ref 0.5–1.4)
EST. GFR  (AFRICAN AMERICAN): >60 ML/MIN/1.73 M^2
EST. GFR  (NON AFRICAN AMERICAN): >60 ML/MIN/1.73 M^2
GLUCOSE SERPL-MCNC: 103 MG/DL (ref 70–110)
HDLC SERPL-MCNC: 45 MG/DL (ref 40–75)
HDLC SERPL: 23.4 % (ref 20–50)
LDLC SERPL CALC-MCNC: 123.2 MG/DL (ref 63–159)
NONHDLC SERPL-MCNC: 147 MG/DL
POTASSIUM SERPL-SCNC: 4.8 MMOL/L (ref 3.5–5.1)
PROT SERPL-MCNC: 7.4 G/DL (ref 6–8.4)
SODIUM SERPL-SCNC: 140 MMOL/L (ref 136–145)
TRIGL SERPL-MCNC: 119 MG/DL (ref 30–150)

## 2021-03-30 ENCOUNTER — PATIENT OUTREACH (OUTPATIENT)
Dept: ADMINISTRATIVE | Facility: OTHER | Age: 81
End: 2021-03-30

## 2021-03-31 ENCOUNTER — OFFICE VISIT (OUTPATIENT)
Dept: DERMATOLOGY | Facility: CLINIC | Age: 81
End: 2021-03-31
Payer: MEDICARE

## 2021-03-31 VITALS — RESPIRATION RATE: 18 BRPM | HEIGHT: 70 IN | WEIGHT: 246.69 LBS | BODY MASS INDEX: 35.32 KG/M2

## 2021-03-31 DIAGNOSIS — Z12.83 SCREENING EXAM FOR SKIN CANCER: ICD-10-CM

## 2021-03-31 DIAGNOSIS — L57.0 AK (ACTINIC KERATOSIS): ICD-10-CM

## 2021-03-31 DIAGNOSIS — D48.9 NEOPLASM OF UNCERTAIN BEHAVIOR: Primary | ICD-10-CM

## 2021-03-31 DIAGNOSIS — Z85.828 HX OF NONMELANOMA SKIN CANCER: ICD-10-CM

## 2021-03-31 DIAGNOSIS — L82.1 SEBORRHEIC KERATOSES: ICD-10-CM

## 2021-03-31 PROCEDURE — 1159F MED LIST DOCD IN RCRD: CPT | Mod: S$GLB,,, | Performed by: DERMATOLOGY

## 2021-03-31 PROCEDURE — 11103 TANGNTL BX SKIN EA SEP/ADDL: CPT | Mod: S$GLB,,, | Performed by: DERMATOLOGY

## 2021-03-31 PROCEDURE — 1101F PT FALLS ASSESS-DOCD LE1/YR: CPT | Mod: CPTII,S$GLB,, | Performed by: DERMATOLOGY

## 2021-03-31 PROCEDURE — 99213 PR OFFICE/OUTPT VISIT, EST, LEVL III, 20-29 MIN: ICD-10-PCS | Mod: 25,S$GLB,, | Performed by: DERMATOLOGY

## 2021-03-31 PROCEDURE — 1126F AMNT PAIN NOTED NONE PRSNT: CPT | Mod: S$GLB,,, | Performed by: DERMATOLOGY

## 2021-03-31 PROCEDURE — 99999 PR PBB SHADOW E&M-EST. PATIENT-LVL IV: CPT | Mod: PBBFAC,,, | Performed by: DERMATOLOGY

## 2021-03-31 PROCEDURE — 1159F PR MEDICATION LIST DOCUMENTED IN MEDICAL RECORD: ICD-10-PCS | Mod: S$GLB,,, | Performed by: DERMATOLOGY

## 2021-03-31 PROCEDURE — 17003 DESTRUCTION, PREMALIGNANT LESIONS; SECOND THROUGH 14 LESIONS: ICD-10-PCS | Mod: S$GLB,,, | Performed by: DERMATOLOGY

## 2021-03-31 PROCEDURE — 3288F FALL RISK ASSESSMENT DOCD: CPT | Mod: CPTII,S$GLB,, | Performed by: DERMATOLOGY

## 2021-03-31 PROCEDURE — 99213 OFFICE O/P EST LOW 20 MIN: CPT | Mod: 25,S$GLB,, | Performed by: DERMATOLOGY

## 2021-03-31 PROCEDURE — 17000 PR DESTRUCTION(LASER SURGERY,CRYOSURGERY,CHEMOSURGERY),PREMALIGNANT LESIONS,FIRST LESION: ICD-10-PCS | Mod: 59,S$GLB,, | Performed by: DERMATOLOGY

## 2021-03-31 PROCEDURE — 3288F PR FALLS RISK ASSESSMENT DOCUMENTED: ICD-10-PCS | Mod: CPTII,S$GLB,, | Performed by: DERMATOLOGY

## 2021-03-31 PROCEDURE — 88305 TISSUE EXAM BY PATHOLOGIST: ICD-10-PCS | Mod: 26,,, | Performed by: PATHOLOGY

## 2021-03-31 PROCEDURE — 1101F PR PT FALLS ASSESS DOC 0-1 FALLS W/OUT INJ PAST YR: ICD-10-PCS | Mod: CPTII,S$GLB,, | Performed by: DERMATOLOGY

## 2021-03-31 PROCEDURE — 17000 DESTRUCT PREMALG LESION: CPT | Mod: 59,S$GLB,, | Performed by: DERMATOLOGY

## 2021-03-31 PROCEDURE — 1126F PR PAIN SEVERITY QUANTIFIED, NO PAIN PRESENT: ICD-10-PCS | Mod: S$GLB,,, | Performed by: DERMATOLOGY

## 2021-03-31 PROCEDURE — 11102 PR TANGENTIAL BIOPSY, SKIN, SINGLE LESION: ICD-10-PCS | Mod: S$GLB,,, | Performed by: DERMATOLOGY

## 2021-03-31 PROCEDURE — 88305 TISSUE EXAM BY PATHOLOGIST: CPT | Mod: 26,,, | Performed by: PATHOLOGY

## 2021-03-31 PROCEDURE — 11102 TANGNTL BX SKIN SINGLE LES: CPT | Mod: S$GLB,,, | Performed by: DERMATOLOGY

## 2021-03-31 PROCEDURE — 11103 PR TANGENTIAL BIOPSY, SKIN, EA ADDTL LESION: ICD-10-PCS | Mod: S$GLB,,, | Performed by: DERMATOLOGY

## 2021-03-31 PROCEDURE — 17003 DESTRUCT PREMALG LES 2-14: CPT | Mod: S$GLB,,, | Performed by: DERMATOLOGY

## 2021-03-31 PROCEDURE — 99999 PR PBB SHADOW E&M-EST. PATIENT-LVL IV: ICD-10-PCS | Mod: PBBFAC,,, | Performed by: DERMATOLOGY

## 2021-03-31 PROCEDURE — 88305 TISSUE EXAM BY PATHOLOGIST: CPT | Mod: 59 | Performed by: PATHOLOGY

## 2021-04-05 LAB
FINAL PATHOLOGIC DIAGNOSIS: NORMAL
GROSS: NORMAL
MICROSCOPIC EXAM: NORMAL

## 2021-04-07 ENCOUNTER — TELEPHONE (OUTPATIENT)
Dept: DERMATOLOGY | Facility: CLINIC | Age: 81
End: 2021-04-07

## 2021-04-30 ENCOUNTER — PATIENT OUTREACH (OUTPATIENT)
Dept: ADMINISTRATIVE | Facility: OTHER | Age: 81
End: 2021-04-30

## 2021-04-30 ENCOUNTER — LAB VISIT (OUTPATIENT)
Dept: LAB | Facility: HOSPITAL | Age: 81
End: 2021-04-30
Attending: UROLOGY
Payer: MEDICARE

## 2021-04-30 ENCOUNTER — OFFICE VISIT (OUTPATIENT)
Dept: UROLOGY | Facility: CLINIC | Age: 81
End: 2021-04-30
Payer: MEDICARE

## 2021-04-30 VITALS — WEIGHT: 246.69 LBS | BODY MASS INDEX: 35.32 KG/M2 | HEIGHT: 70 IN

## 2021-04-30 DIAGNOSIS — Z85.46 HISTORY OF PROSTATE CANCER: Primary | ICD-10-CM

## 2021-04-30 DIAGNOSIS — Z85.46 HISTORY OF PROSTATE CANCER: ICD-10-CM

## 2021-04-30 LAB — COMPLEXED PSA SERPL-MCNC: 0.16 NG/ML (ref 0–4)

## 2021-04-30 PROCEDURE — 1159F PR MEDICATION LIST DOCUMENTED IN MEDICAL RECORD: ICD-10-PCS | Mod: S$GLB,,, | Performed by: UROLOGY

## 2021-04-30 PROCEDURE — 36415 COLL VENOUS BLD VENIPUNCTURE: CPT | Mod: PO | Performed by: UROLOGY

## 2021-04-30 PROCEDURE — 1126F AMNT PAIN NOTED NONE PRSNT: CPT | Mod: S$GLB,,, | Performed by: UROLOGY

## 2021-04-30 PROCEDURE — 99999 PR PBB SHADOW E&M-EST. PATIENT-LVL III: ICD-10-PCS | Mod: PBBFAC,,, | Performed by: UROLOGY

## 2021-04-30 PROCEDURE — 99213 OFFICE O/P EST LOW 20 MIN: CPT | Mod: S$GLB,,, | Performed by: UROLOGY

## 2021-04-30 PROCEDURE — 3288F PR FALLS RISK ASSESSMENT DOCUMENTED: ICD-10-PCS | Mod: CPTII,S$GLB,, | Performed by: UROLOGY

## 2021-04-30 PROCEDURE — 84153 ASSAY OF PSA TOTAL: CPT | Performed by: UROLOGY

## 2021-04-30 PROCEDURE — 99999 PR PBB SHADOW E&M-EST. PATIENT-LVL III: CPT | Mod: PBBFAC,,, | Performed by: UROLOGY

## 2021-04-30 PROCEDURE — 1101F PR PT FALLS ASSESS DOC 0-1 FALLS W/OUT INJ PAST YR: ICD-10-PCS | Mod: CPTII,S$GLB,, | Performed by: UROLOGY

## 2021-04-30 PROCEDURE — 3288F FALL RISK ASSESSMENT DOCD: CPT | Mod: CPTII,S$GLB,, | Performed by: UROLOGY

## 2021-04-30 PROCEDURE — 99213 PR OFFICE/OUTPT VISIT, EST, LEVL III, 20-29 MIN: ICD-10-PCS | Mod: S$GLB,,, | Performed by: UROLOGY

## 2021-04-30 PROCEDURE — 1159F MED LIST DOCD IN RCRD: CPT | Mod: S$GLB,,, | Performed by: UROLOGY

## 2021-04-30 PROCEDURE — 1101F PT FALLS ASSESS-DOCD LE1/YR: CPT | Mod: CPTII,S$GLB,, | Performed by: UROLOGY

## 2021-04-30 PROCEDURE — 1126F PR PAIN SEVERITY QUANTIFIED, NO PAIN PRESENT: ICD-10-PCS | Mod: S$GLB,,, | Performed by: UROLOGY

## 2021-04-30 RX ORDER — OXYBUTYNIN CHLORIDE 5 MG/1
5 TABLET, EXTENDED RELEASE ORAL DAILY
Qty: 30 TABLET | Refills: 11 | Status: SHIPPED | OUTPATIENT
Start: 2021-04-30 | End: 2021-08-06

## 2021-05-13 ENCOUNTER — OFFICE VISIT (OUTPATIENT)
Dept: DERMATOLOGY | Facility: CLINIC | Age: 81
End: 2021-05-13
Payer: MEDICARE

## 2021-05-13 VITALS
WEIGHT: 230 LBS | HEART RATE: 56 BPM | BODY MASS INDEX: 32.93 KG/M2 | DIASTOLIC BLOOD PRESSURE: 80 MMHG | HEIGHT: 70 IN | SYSTOLIC BLOOD PRESSURE: 155 MMHG

## 2021-05-13 DIAGNOSIS — C44.41 BASAL CELL CARCINOMA (BCC) OF RIGHT SIDE OF NECK: Primary | ICD-10-CM

## 2021-05-13 PROCEDURE — 99213 PR OFFICE/OUTPT VISIT, EST, LEVL III, 20-29 MIN: ICD-10-PCS | Mod: S$GLB,,, | Performed by: DERMATOLOGY

## 2021-05-13 PROCEDURE — 99999 PR PBB SHADOW E&M-EST. PATIENT-LVL III: ICD-10-PCS | Mod: PBBFAC,,, | Performed by: DERMATOLOGY

## 2021-05-13 PROCEDURE — 3288F PR FALLS RISK ASSESSMENT DOCUMENTED: ICD-10-PCS | Mod: CPTII,S$GLB,, | Performed by: DERMATOLOGY

## 2021-05-13 PROCEDURE — 1159F MED LIST DOCD IN RCRD: CPT | Mod: S$GLB,,, | Performed by: DERMATOLOGY

## 2021-05-13 PROCEDURE — 1101F PT FALLS ASSESS-DOCD LE1/YR: CPT | Mod: CPTII,S$GLB,, | Performed by: DERMATOLOGY

## 2021-05-13 PROCEDURE — 99213 OFFICE O/P EST LOW 20 MIN: CPT | Mod: S$GLB,,, | Performed by: DERMATOLOGY

## 2021-05-13 PROCEDURE — 1126F PR PAIN SEVERITY QUANTIFIED, NO PAIN PRESENT: ICD-10-PCS | Mod: S$GLB,,, | Performed by: DERMATOLOGY

## 2021-05-13 PROCEDURE — 1126F AMNT PAIN NOTED NONE PRSNT: CPT | Mod: S$GLB,,, | Performed by: DERMATOLOGY

## 2021-05-13 PROCEDURE — 99999 PR PBB SHADOW E&M-EST. PATIENT-LVL III: CPT | Mod: PBBFAC,,, | Performed by: DERMATOLOGY

## 2021-05-13 PROCEDURE — 1101F PR PT FALLS ASSESS DOC 0-1 FALLS W/OUT INJ PAST YR: ICD-10-PCS | Mod: CPTII,S$GLB,, | Performed by: DERMATOLOGY

## 2021-05-13 PROCEDURE — 3288F FALL RISK ASSESSMENT DOCD: CPT | Mod: CPTII,S$GLB,, | Performed by: DERMATOLOGY

## 2021-05-13 PROCEDURE — 1159F PR MEDICATION LIST DOCUMENTED IN MEDICAL RECORD: ICD-10-PCS | Mod: S$GLB,,, | Performed by: DERMATOLOGY

## 2021-07-06 ENCOUNTER — TELEPHONE (OUTPATIENT)
Dept: DERMATOLOGY | Facility: CLINIC | Age: 81
End: 2021-07-06

## 2021-07-15 ENCOUNTER — PATIENT OUTREACH (OUTPATIENT)
Dept: ADMINISTRATIVE | Facility: OTHER | Age: 81
End: 2021-07-15

## 2021-07-16 ENCOUNTER — OFFICE VISIT (OUTPATIENT)
Dept: SPINE | Facility: CLINIC | Age: 81
End: 2021-07-16
Payer: MEDICARE

## 2021-07-16 VITALS
WEIGHT: 231.5 LBS | HEIGHT: 70 IN | BODY MASS INDEX: 33.14 KG/M2 | DIASTOLIC BLOOD PRESSURE: 75 MMHG | SYSTOLIC BLOOD PRESSURE: 141 MMHG | HEART RATE: 60 BPM

## 2021-07-16 DIAGNOSIS — M54.9 DORSALGIA, UNSPECIFIED: ICD-10-CM

## 2021-07-16 DIAGNOSIS — M54.16 LUMBAR RADICULOPATHY: ICD-10-CM

## 2021-07-16 PROCEDURE — 1101F PR PT FALLS ASSESS DOC 0-1 FALLS W/OUT INJ PAST YR: ICD-10-PCS | Mod: CPTII,S$GLB,, | Performed by: PHYSICIAN ASSISTANT

## 2021-07-16 PROCEDURE — 99203 PR OFFICE/OUTPT VISIT, NEW, LEVL III, 30-44 MIN: ICD-10-PCS | Mod: S$GLB,,, | Performed by: PHYSICIAN ASSISTANT

## 2021-07-16 PROCEDURE — 1125F AMNT PAIN NOTED PAIN PRSNT: CPT | Mod: S$GLB,,, | Performed by: PHYSICIAN ASSISTANT

## 2021-07-16 PROCEDURE — 1159F PR MEDICATION LIST DOCUMENTED IN MEDICAL RECORD: ICD-10-PCS | Mod: S$GLB,,, | Performed by: PHYSICIAN ASSISTANT

## 2021-07-16 PROCEDURE — 3288F PR FALLS RISK ASSESSMENT DOCUMENTED: ICD-10-PCS | Mod: CPTII,S$GLB,, | Performed by: PHYSICIAN ASSISTANT

## 2021-07-16 PROCEDURE — 99999 PR PBB SHADOW E&M-EST. PATIENT-LVL V: CPT | Mod: PBBFAC,,, | Performed by: PHYSICIAN ASSISTANT

## 2021-07-16 PROCEDURE — 1125F PR PAIN SEVERITY QUANTIFIED, PAIN PRESENT: ICD-10-PCS | Mod: S$GLB,,, | Performed by: PHYSICIAN ASSISTANT

## 2021-07-16 PROCEDURE — 99999 PR PBB SHADOW E&M-EST. PATIENT-LVL V: ICD-10-PCS | Mod: PBBFAC,,, | Performed by: PHYSICIAN ASSISTANT

## 2021-07-16 PROCEDURE — 1101F PT FALLS ASSESS-DOCD LE1/YR: CPT | Mod: CPTII,S$GLB,, | Performed by: PHYSICIAN ASSISTANT

## 2021-07-16 PROCEDURE — 3288F FALL RISK ASSESSMENT DOCD: CPT | Mod: CPTII,S$GLB,, | Performed by: PHYSICIAN ASSISTANT

## 2021-07-16 PROCEDURE — 1159F MED LIST DOCD IN RCRD: CPT | Mod: S$GLB,,, | Performed by: PHYSICIAN ASSISTANT

## 2021-07-16 PROCEDURE — 99203 OFFICE O/P NEW LOW 30 MIN: CPT | Mod: S$GLB,,, | Performed by: PHYSICIAN ASSISTANT

## 2021-07-16 RX ORDER — METHYLPREDNISOLONE 4 MG/1
TABLET ORAL
Qty: 1 PACKAGE | Refills: 0 | Status: SHIPPED | OUTPATIENT
Start: 2021-07-16 | End: 2021-08-06

## 2021-07-22 ENCOUNTER — TELEPHONE (OUTPATIENT)
Dept: GASTROENTEROLOGY | Facility: CLINIC | Age: 81
End: 2021-07-22

## 2021-07-26 ENCOUNTER — TELEPHONE (OUTPATIENT)
Dept: GASTROENTEROLOGY | Facility: CLINIC | Age: 81
End: 2021-07-26

## 2021-07-30 ENCOUNTER — HOSPITAL ENCOUNTER (OUTPATIENT)
Dept: RADIOLOGY | Facility: HOSPITAL | Age: 81
Discharge: HOME | End: 2021-07-30
Attending: PHYSICIAN ASSISTANT
Payer: MEDICARE

## 2021-07-30 DIAGNOSIS — M54.16 LUMBAR RADICULOPATHY: ICD-10-CM

## 2021-07-30 PROCEDURE — 72148 MRI LUMBAR SPINE WITHOUT CONTRAST: ICD-10-PCS | Mod: 26,,, | Performed by: RADIOLOGY

## 2021-07-30 PROCEDURE — 72148 MRI LUMBAR SPINE W/O DYE: CPT | Mod: TC,PO

## 2021-07-30 PROCEDURE — 72120 X-RAY BEND ONLY L-S SPINE: CPT | Mod: 26,,, | Performed by: RADIOLOGY

## 2021-07-30 PROCEDURE — 72120 XR LUMBAR SPINE FLEXION AND EXTENSION ONLY: ICD-10-PCS | Mod: 26,,, | Performed by: RADIOLOGY

## 2021-07-30 PROCEDURE — 72120 X-RAY BEND ONLY L-S SPINE: CPT | Mod: TC,FY,PO

## 2021-07-30 PROCEDURE — 72148 MRI LUMBAR SPINE W/O DYE: CPT | Mod: 26,,, | Performed by: RADIOLOGY

## 2021-08-02 ENCOUNTER — OFFICE VISIT (OUTPATIENT)
Dept: SPINE | Facility: CLINIC | Age: 81
End: 2021-08-02
Payer: MEDICARE

## 2021-08-02 ENCOUNTER — LAB VISIT (OUTPATIENT)
Dept: LAB | Facility: HOSPITAL | Age: 81
End: 2021-08-02
Attending: INTERNAL MEDICINE
Payer: MEDICARE

## 2021-08-02 ENCOUNTER — OFFICE VISIT (OUTPATIENT)
Dept: FAMILY MEDICINE | Facility: CLINIC | Age: 81
End: 2021-08-02
Payer: MEDICARE

## 2021-08-02 ENCOUNTER — TELEPHONE (OUTPATIENT)
Dept: GASTROENTEROLOGY | Facility: CLINIC | Age: 81
End: 2021-08-02

## 2021-08-02 VITALS
SYSTOLIC BLOOD PRESSURE: 120 MMHG | HEART RATE: 66 BPM | DIASTOLIC BLOOD PRESSURE: 82 MMHG | BODY MASS INDEX: 32.86 KG/M2 | HEIGHT: 70 IN | WEIGHT: 229.5 LBS

## 2021-08-02 VITALS
SYSTOLIC BLOOD PRESSURE: 120 MMHG | BODY MASS INDEX: 32.86 KG/M2 | DIASTOLIC BLOOD PRESSURE: 82 MMHG | HEART RATE: 66 BPM | WEIGHT: 229.5 LBS | OXYGEN SATURATION: 99 % | HEIGHT: 70 IN | TEMPERATURE: 98 F

## 2021-08-02 DIAGNOSIS — R06.00 DYSPNEA, UNSPECIFIED TYPE: ICD-10-CM

## 2021-08-02 DIAGNOSIS — K62.5 RECTAL BLEEDING: ICD-10-CM

## 2021-08-02 DIAGNOSIS — Z20.822 ENCOUNTER FOR LABORATORY TESTING FOR COVID-19 VIRUS: ICD-10-CM

## 2021-08-02 DIAGNOSIS — M48.062 SPINAL STENOSIS OF LUMBAR REGION WITH NEUROGENIC CLAUDICATION: ICD-10-CM

## 2021-08-02 DIAGNOSIS — K59.09 CHRONIC CONSTIPATION: Primary | ICD-10-CM

## 2021-08-02 DIAGNOSIS — M54.16 LUMBAR RADICULOPATHY: Primary | ICD-10-CM

## 2021-08-02 DIAGNOSIS — R60.0 LOWER EXTREMITY EDEMA: ICD-10-CM

## 2021-08-02 LAB — BNP SERPL-MCNC: 60 PG/ML (ref 0–99)

## 2021-08-02 PROCEDURE — 1159F PR MEDICATION LIST DOCUMENTED IN MEDICAL RECORD: ICD-10-PCS | Mod: CPTII,S$GLB,, | Performed by: PHYSICIAN ASSISTANT

## 2021-08-02 PROCEDURE — 3074F SYST BP LT 130 MM HG: CPT | Mod: CPTII,S$GLB,, | Performed by: INTERNAL MEDICINE

## 2021-08-02 PROCEDURE — 1101F PR PT FALLS ASSESS DOC 0-1 FALLS W/OUT INJ PAST YR: ICD-10-PCS | Mod: CPTII,S$GLB,, | Performed by: PHYSICIAN ASSISTANT

## 2021-08-02 PROCEDURE — 99999 PR PBB SHADOW E&M-EST. PATIENT-LVL V: CPT | Mod: PBBFAC,,, | Performed by: PHYSICIAN ASSISTANT

## 2021-08-02 PROCEDURE — 3079F PR MOST RECENT DIASTOLIC BLOOD PRESSURE 80-89 MM HG: ICD-10-PCS | Mod: CPTII,S$GLB,, | Performed by: INTERNAL MEDICINE

## 2021-08-02 PROCEDURE — 1160F RVW MEDS BY RX/DR IN RCRD: CPT | Mod: CPTII,S$GLB,, | Performed by: INTERNAL MEDICINE

## 2021-08-02 PROCEDURE — 99214 OFFICE O/P EST MOD 30 MIN: CPT | Mod: S$GLB,,, | Performed by: PHYSICIAN ASSISTANT

## 2021-08-02 PROCEDURE — 3288F FALL RISK ASSESSMENT DOCD: CPT | Mod: CPTII,S$GLB,, | Performed by: PHYSICIAN ASSISTANT

## 2021-08-02 PROCEDURE — 1159F MED LIST DOCD IN RCRD: CPT | Mod: CPTII,S$GLB,, | Performed by: PHYSICIAN ASSISTANT

## 2021-08-02 PROCEDURE — 1126F PR PAIN SEVERITY QUANTIFIED, NO PAIN PRESENT: ICD-10-PCS | Mod: CPTII,S$GLB,, | Performed by: INTERNAL MEDICINE

## 2021-08-02 PROCEDURE — 99999 PR PBB SHADOW E&M-EST. PATIENT-LVL V: ICD-10-PCS | Mod: PBBFAC,,, | Performed by: PHYSICIAN ASSISTANT

## 2021-08-02 PROCEDURE — 99214 OFFICE O/P EST MOD 30 MIN: CPT | Mod: S$GLB,,, | Performed by: INTERNAL MEDICINE

## 2021-08-02 PROCEDURE — 83880 ASSAY OF NATRIURETIC PEPTIDE: CPT | Performed by: INTERNAL MEDICINE

## 2021-08-02 PROCEDURE — 3074F PR MOST RECENT SYSTOLIC BLOOD PRESSURE < 130 MM HG: ICD-10-PCS | Mod: CPTII,S$GLB,, | Performed by: INTERNAL MEDICINE

## 2021-08-02 PROCEDURE — 1160F PR REVIEW ALL MEDS BY PRESCRIBER/CLIN PHARMACIST DOCUMENTED: ICD-10-PCS | Mod: CPTII,S$GLB,, | Performed by: PHYSICIAN ASSISTANT

## 2021-08-02 PROCEDURE — 1160F PR REVIEW ALL MEDS BY PRESCRIBER/CLIN PHARMACIST DOCUMENTED: ICD-10-PCS | Mod: CPTII,S$GLB,, | Performed by: INTERNAL MEDICINE

## 2021-08-02 PROCEDURE — 3288F FALL RISK ASSESSMENT DOCD: CPT | Mod: CPTII,S$GLB,, | Performed by: INTERNAL MEDICINE

## 2021-08-02 PROCEDURE — 99999 PR PBB SHADOW E&M-EST. PATIENT-LVL IV: ICD-10-PCS | Mod: PBBFAC,,, | Performed by: INTERNAL MEDICINE

## 2021-08-02 PROCEDURE — 99214 PR OFFICE/OUTPT VISIT, EST, LEVL IV, 30-39 MIN: ICD-10-PCS | Mod: S$GLB,,, | Performed by: INTERNAL MEDICINE

## 2021-08-02 PROCEDURE — 3074F SYST BP LT 130 MM HG: CPT | Mod: CPTII,S$GLB,, | Performed by: PHYSICIAN ASSISTANT

## 2021-08-02 PROCEDURE — 3288F PR FALLS RISK ASSESSMENT DOCUMENTED: ICD-10-PCS | Mod: CPTII,S$GLB,, | Performed by: PHYSICIAN ASSISTANT

## 2021-08-02 PROCEDURE — 1101F PR PT FALLS ASSESS DOC 0-1 FALLS W/OUT INJ PAST YR: ICD-10-PCS | Mod: CPTII,S$GLB,, | Performed by: INTERNAL MEDICINE

## 2021-08-02 PROCEDURE — 3079F DIAST BP 80-89 MM HG: CPT | Mod: CPTII,S$GLB,, | Performed by: INTERNAL MEDICINE

## 2021-08-02 PROCEDURE — 1125F AMNT PAIN NOTED PAIN PRSNT: CPT | Mod: CPTII,S$GLB,, | Performed by: PHYSICIAN ASSISTANT

## 2021-08-02 PROCEDURE — 1159F PR MEDICATION LIST DOCUMENTED IN MEDICAL RECORD: ICD-10-PCS | Mod: CPTII,S$GLB,, | Performed by: INTERNAL MEDICINE

## 2021-08-02 PROCEDURE — 1126F AMNT PAIN NOTED NONE PRSNT: CPT | Mod: CPTII,S$GLB,, | Performed by: INTERNAL MEDICINE

## 2021-08-02 PROCEDURE — 1101F PT FALLS ASSESS-DOCD LE1/YR: CPT | Mod: CPTII,S$GLB,, | Performed by: PHYSICIAN ASSISTANT

## 2021-08-02 PROCEDURE — 99214 PR OFFICE/OUTPT VISIT, EST, LEVL IV, 30-39 MIN: ICD-10-PCS | Mod: S$GLB,,, | Performed by: PHYSICIAN ASSISTANT

## 2021-08-02 PROCEDURE — 1125F PR PAIN SEVERITY QUANTIFIED, PAIN PRESENT: ICD-10-PCS | Mod: CPTII,S$GLB,, | Performed by: PHYSICIAN ASSISTANT

## 2021-08-02 PROCEDURE — 1160F RVW MEDS BY RX/DR IN RCRD: CPT | Mod: CPTII,S$GLB,, | Performed by: PHYSICIAN ASSISTANT

## 2021-08-02 PROCEDURE — 99999 PR PBB SHADOW E&M-EST. PATIENT-LVL IV: CPT | Mod: PBBFAC,,, | Performed by: INTERNAL MEDICINE

## 2021-08-02 PROCEDURE — 36415 COLL VENOUS BLD VENIPUNCTURE: CPT | Mod: PO | Performed by: INTERNAL MEDICINE

## 2021-08-02 PROCEDURE — 3079F DIAST BP 80-89 MM HG: CPT | Mod: CPTII,S$GLB,, | Performed by: PHYSICIAN ASSISTANT

## 2021-08-02 PROCEDURE — 1159F MED LIST DOCD IN RCRD: CPT | Mod: CPTII,S$GLB,, | Performed by: INTERNAL MEDICINE

## 2021-08-02 PROCEDURE — 3074F PR MOST RECENT SYSTOLIC BLOOD PRESSURE < 130 MM HG: ICD-10-PCS | Mod: CPTII,S$GLB,, | Performed by: PHYSICIAN ASSISTANT

## 2021-08-02 PROCEDURE — 1101F PT FALLS ASSESS-DOCD LE1/YR: CPT | Mod: CPTII,S$GLB,, | Performed by: INTERNAL MEDICINE

## 2021-08-02 PROCEDURE — 3288F PR FALLS RISK ASSESSMENT DOCUMENTED: ICD-10-PCS | Mod: CPTII,S$GLB,, | Performed by: INTERNAL MEDICINE

## 2021-08-02 PROCEDURE — 3079F PR MOST RECENT DIASTOLIC BLOOD PRESSURE 80-89 MM HG: ICD-10-PCS | Mod: CPTII,S$GLB,, | Performed by: PHYSICIAN ASSISTANT

## 2021-08-04 ENCOUNTER — TELEPHONE (OUTPATIENT)
Dept: NEUROSURGERY | Facility: CLINIC | Age: 81
End: 2021-08-04

## 2021-08-04 ENCOUNTER — PROCEDURE VISIT (OUTPATIENT)
Dept: DERMATOLOGY | Facility: CLINIC | Age: 81
End: 2021-08-04
Payer: MEDICARE

## 2021-08-04 ENCOUNTER — OFFICE VISIT (OUTPATIENT)
Dept: NEUROSURGERY | Facility: CLINIC | Age: 81
End: 2021-08-04
Payer: MEDICARE

## 2021-08-04 VITALS
SYSTOLIC BLOOD PRESSURE: 130 MMHG | BODY MASS INDEX: 32.78 KG/M2 | WEIGHT: 229 LBS | HEIGHT: 70 IN | HEART RATE: 82 BPM | DIASTOLIC BLOOD PRESSURE: 73 MMHG

## 2021-08-04 VITALS
DIASTOLIC BLOOD PRESSURE: 72 MMHG | HEIGHT: 70 IN | WEIGHT: 229.5 LBS | BODY MASS INDEX: 32.86 KG/M2 | SYSTOLIC BLOOD PRESSURE: 146 MMHG | HEART RATE: 53 BPM

## 2021-08-04 DIAGNOSIS — M48.062 SPINAL STENOSIS OF LUMBAR REGION WITH NEUROGENIC CLAUDICATION: ICD-10-CM

## 2021-08-04 DIAGNOSIS — C44.41 BASAL CELL CARCINOMA OF NECK: Primary | ICD-10-CM

## 2021-08-04 DIAGNOSIS — R79.1 ABNORMAL COAGULATION PROFILE: ICD-10-CM

## 2021-08-04 DIAGNOSIS — M54.16 LUMBAR RADICULOPATHY: ICD-10-CM

## 2021-08-04 DIAGNOSIS — R94.5 ABNORMAL RESULTS OF LIVER FUNCTION STUDIES: ICD-10-CM

## 2021-08-04 DIAGNOSIS — M54.16 LUMBAR RADICULOPATHY: Primary | ICD-10-CM

## 2021-08-04 PROCEDURE — 1125F AMNT PAIN NOTED PAIN PRSNT: CPT | Mod: CPTII,S$GLB,, | Performed by: NEUROLOGICAL SURGERY

## 2021-08-04 PROCEDURE — 3078F PR MOST RECENT DIASTOLIC BLOOD PRESSURE < 80 MM HG: ICD-10-PCS | Mod: CPTII,S$GLB,, | Performed by: NEUROLOGICAL SURGERY

## 2021-08-04 PROCEDURE — 99212 PR OFFICE/OUTPT VISIT, EST, LEVL II, 10-19 MIN: ICD-10-PCS | Mod: S$GLB,,, | Performed by: DERMATOLOGY

## 2021-08-04 PROCEDURE — 3077F PR MOST RECENT SYSTOLIC BLOOD PRESSURE >= 140 MM HG: ICD-10-PCS | Mod: CPTII,S$GLB,, | Performed by: NEUROLOGICAL SURGERY

## 2021-08-04 PROCEDURE — 1101F PR PT FALLS ASSESS DOC 0-1 FALLS W/OUT INJ PAST YR: ICD-10-PCS | Mod: CPTII,S$GLB,, | Performed by: NEUROLOGICAL SURGERY

## 2021-08-04 PROCEDURE — 3077F SYST BP >= 140 MM HG: CPT | Mod: CPTII,S$GLB,, | Performed by: NEUROLOGICAL SURGERY

## 2021-08-04 PROCEDURE — 99215 PR OFFICE/OUTPT VISIT, EST, LEVL V, 40-54 MIN: ICD-10-PCS | Mod: S$GLB,,, | Performed by: NEUROLOGICAL SURGERY

## 2021-08-04 PROCEDURE — 1125F PR PAIN SEVERITY QUANTIFIED, PAIN PRESENT: ICD-10-PCS | Mod: CPTII,S$GLB,, | Performed by: NEUROLOGICAL SURGERY

## 2021-08-04 PROCEDURE — 99215 OFFICE O/P EST HI 40 MIN: CPT | Mod: S$GLB,,, | Performed by: NEUROLOGICAL SURGERY

## 2021-08-04 PROCEDURE — 1101F PT FALLS ASSESS-DOCD LE1/YR: CPT | Mod: CPTII,S$GLB,, | Performed by: NEUROLOGICAL SURGERY

## 2021-08-04 PROCEDURE — 3288F FALL RISK ASSESSMENT DOCD: CPT | Mod: CPTII,S$GLB,, | Performed by: NEUROLOGICAL SURGERY

## 2021-08-04 PROCEDURE — 99999 PR PBB SHADOW E&M-EST. PATIENT-LVL III: CPT | Mod: PBBFAC,,, | Performed by: NEUROLOGICAL SURGERY

## 2021-08-04 PROCEDURE — 3078F DIAST BP <80 MM HG: CPT | Mod: CPTII,S$GLB,, | Performed by: NEUROLOGICAL SURGERY

## 2021-08-04 PROCEDURE — 99212 OFFICE O/P EST SF 10 MIN: CPT | Mod: S$GLB,,, | Performed by: DERMATOLOGY

## 2021-08-04 PROCEDURE — 3288F PR FALLS RISK ASSESSMENT DOCUMENTED: ICD-10-PCS | Mod: CPTII,S$GLB,, | Performed by: NEUROLOGICAL SURGERY

## 2021-08-04 PROCEDURE — 99999 PR PBB SHADOW E&M-EST. PATIENT-LVL III: ICD-10-PCS | Mod: PBBFAC,,, | Performed by: NEUROLOGICAL SURGERY

## 2021-08-04 RX ORDER — LIDOCAINE HYDROCHLORIDE 10 MG/ML
1 INJECTION, SOLUTION EPIDURAL; INFILTRATION; INTRACAUDAL; PERINEURAL ONCE
Status: CANCELLED | OUTPATIENT
Start: 2021-08-04 | End: 2021-08-04

## 2021-08-04 RX ORDER — SODIUM CHLORIDE, SODIUM LACTATE, POTASSIUM CHLORIDE, CALCIUM CHLORIDE 600; 310; 30; 20 MG/100ML; MG/100ML; MG/100ML; MG/100ML
INJECTION, SOLUTION INTRAVENOUS CONTINUOUS
Status: CANCELLED | OUTPATIENT
Start: 2021-08-04

## 2021-08-05 ENCOUNTER — TELEPHONE (OUTPATIENT)
Dept: PAIN MEDICINE | Facility: CLINIC | Age: 81
End: 2021-08-05

## 2021-08-06 ENCOUNTER — OFFICE VISIT (OUTPATIENT)
Dept: FAMILY MEDICINE | Facility: CLINIC | Age: 81
End: 2021-08-06
Payer: MEDICARE

## 2021-08-06 VITALS
HEIGHT: 70 IN | BODY MASS INDEX: 32.92 KG/M2 | SYSTOLIC BLOOD PRESSURE: 138 MMHG | OXYGEN SATURATION: 99 % | HEART RATE: 68 BPM | WEIGHT: 229.94 LBS | DIASTOLIC BLOOD PRESSURE: 80 MMHG

## 2021-08-06 DIAGNOSIS — M48.062 SPINAL STENOSIS OF LUMBAR REGION WITH NEUROGENIC CLAUDICATION: ICD-10-CM

## 2021-08-06 DIAGNOSIS — M47.816 FACET ARTHRITIS, DEGENERATIVE, LUMBAR SPINE: ICD-10-CM

## 2021-08-06 DIAGNOSIS — M51.36 DEGENERATIVE DISC DISEASE, LUMBAR: Primary | ICD-10-CM

## 2021-08-06 PROCEDURE — 99999 PR PBB SHADOW E&M-EST. PATIENT-LVL III: CPT | Mod: PBBFAC,,, | Performed by: FAMILY MEDICINE

## 2021-08-06 PROCEDURE — 99214 PR OFFICE/OUTPT VISIT, EST, LEVL IV, 30-39 MIN: ICD-10-PCS | Mod: S$GLB,,, | Performed by: FAMILY MEDICINE

## 2021-08-06 PROCEDURE — 3075F PR MOST RECENT SYSTOLIC BLOOD PRESS GE 130-139MM HG: ICD-10-PCS | Mod: CPTII,S$GLB,, | Performed by: FAMILY MEDICINE

## 2021-08-06 PROCEDURE — 1101F PT FALLS ASSESS-DOCD LE1/YR: CPT | Mod: CPTII,S$GLB,, | Performed by: FAMILY MEDICINE

## 2021-08-06 PROCEDURE — 1159F PR MEDICATION LIST DOCUMENTED IN MEDICAL RECORD: ICD-10-PCS | Mod: CPTII,S$GLB,, | Performed by: FAMILY MEDICINE

## 2021-08-06 PROCEDURE — 99999 PR PBB SHADOW E&M-EST. PATIENT-LVL III: ICD-10-PCS | Mod: PBBFAC,,, | Performed by: FAMILY MEDICINE

## 2021-08-06 PROCEDURE — 1159F MED LIST DOCD IN RCRD: CPT | Mod: CPTII,S$GLB,, | Performed by: FAMILY MEDICINE

## 2021-08-06 PROCEDURE — 3288F PR FALLS RISK ASSESSMENT DOCUMENTED: ICD-10-PCS | Mod: CPTII,S$GLB,, | Performed by: FAMILY MEDICINE

## 2021-08-06 PROCEDURE — 1160F PR REVIEW ALL MEDS BY PRESCRIBER/CLIN PHARMACIST DOCUMENTED: ICD-10-PCS | Mod: CPTII,S$GLB,, | Performed by: FAMILY MEDICINE

## 2021-08-06 PROCEDURE — 1125F PR PAIN SEVERITY QUANTIFIED, PAIN PRESENT: ICD-10-PCS | Mod: CPTII,S$GLB,, | Performed by: FAMILY MEDICINE

## 2021-08-06 PROCEDURE — 3079F DIAST BP 80-89 MM HG: CPT | Mod: CPTII,S$GLB,, | Performed by: FAMILY MEDICINE

## 2021-08-06 PROCEDURE — 3075F SYST BP GE 130 - 139MM HG: CPT | Mod: CPTII,S$GLB,, | Performed by: FAMILY MEDICINE

## 2021-08-06 PROCEDURE — 1125F AMNT PAIN NOTED PAIN PRSNT: CPT | Mod: CPTII,S$GLB,, | Performed by: FAMILY MEDICINE

## 2021-08-06 PROCEDURE — 3079F PR MOST RECENT DIASTOLIC BLOOD PRESSURE 80-89 MM HG: ICD-10-PCS | Mod: CPTII,S$GLB,, | Performed by: FAMILY MEDICINE

## 2021-08-06 PROCEDURE — 1101F PR PT FALLS ASSESS DOC 0-1 FALLS W/OUT INJ PAST YR: ICD-10-PCS | Mod: CPTII,S$GLB,, | Performed by: FAMILY MEDICINE

## 2021-08-06 PROCEDURE — 99214 OFFICE O/P EST MOD 30 MIN: CPT | Mod: S$GLB,,, | Performed by: FAMILY MEDICINE

## 2021-08-06 PROCEDURE — 1160F RVW MEDS BY RX/DR IN RCRD: CPT | Mod: CPTII,S$GLB,, | Performed by: FAMILY MEDICINE

## 2021-08-06 PROCEDURE — 3288F FALL RISK ASSESSMENT DOCD: CPT | Mod: CPTII,S$GLB,, | Performed by: FAMILY MEDICINE

## 2021-08-06 RX ORDER — ACETAMINOPHEN 500 MG
500 TABLET ORAL EVERY 6 HOURS PRN
COMMUNITY

## 2021-08-10 ENCOUNTER — TELEPHONE (OUTPATIENT)
Dept: FAMILY MEDICINE | Facility: CLINIC | Age: 81
End: 2021-08-10

## 2021-08-12 ENCOUNTER — TELEPHONE (OUTPATIENT)
Dept: FAMILY MEDICINE | Facility: CLINIC | Age: 81
End: 2021-08-12

## 2021-08-13 PROBLEM — M54.16 LUMBAR RADICULOPATHY: Status: ACTIVE | Noted: 2021-08-13

## 2021-08-23 ENCOUNTER — CLINICAL SUPPORT (OUTPATIENT)
Dept: NEUROSURGERY | Facility: CLINIC | Age: 81
End: 2021-08-23
Payer: MEDICARE

## 2021-08-23 VITALS — TEMPERATURE: 97 F

## 2021-08-23 PROCEDURE — 99999 PR PBB SHADOW E&M-EST. PATIENT-LVL II: ICD-10-PCS | Mod: PBBFAC,,,

## 2021-08-23 PROCEDURE — 99999 PR PBB SHADOW E&M-EST. PATIENT-LVL II: CPT | Mod: PBBFAC,,,

## 2021-08-31 ENCOUNTER — TELEPHONE (OUTPATIENT)
Dept: GASTROENTEROLOGY | Facility: CLINIC | Age: 81
End: 2021-08-31

## 2021-09-10 ENCOUNTER — TELEPHONE (OUTPATIENT)
Dept: GASTROENTEROLOGY | Facility: CLINIC | Age: 81
End: 2021-09-10

## 2021-09-15 RX ORDER — TAMSULOSIN HYDROCHLORIDE 0.4 MG/1
CAPSULE ORAL
Qty: 180 CAPSULE | Refills: 3 | OUTPATIENT
Start: 2021-09-15

## 2021-09-16 RX ORDER — TAMSULOSIN HYDROCHLORIDE 0.4 MG/1
0.4 CAPSULE ORAL DAILY
Qty: 90 CAPSULE | Refills: 3 | Status: SHIPPED | OUTPATIENT
Start: 2021-09-16 | End: 2021-10-29 | Stop reason: SDUPTHER

## 2021-09-28 ENCOUNTER — OFFICE VISIT (OUTPATIENT)
Dept: NEUROSURGERY | Facility: CLINIC | Age: 81
End: 2021-09-28
Payer: MEDICARE

## 2021-09-28 VITALS
HEIGHT: 67 IN | WEIGHT: 229.06 LBS | RESPIRATION RATE: 18 BRPM | HEART RATE: 62 BPM | BODY MASS INDEX: 35.95 KG/M2 | DIASTOLIC BLOOD PRESSURE: 82 MMHG | SYSTOLIC BLOOD PRESSURE: 138 MMHG

## 2021-09-28 DIAGNOSIS — M48.062 LUMBAR STENOSIS WITH NEUROGENIC CLAUDICATION: Primary | ICD-10-CM

## 2021-09-28 PROCEDURE — 1159F PR MEDICATION LIST DOCUMENTED IN MEDICAL RECORD: ICD-10-PCS | Mod: CPTII,S$GLB,, | Performed by: NEUROLOGICAL SURGERY

## 2021-09-28 PROCEDURE — 1126F AMNT PAIN NOTED NONE PRSNT: CPT | Mod: CPTII,S$GLB,, | Performed by: NEUROLOGICAL SURGERY

## 2021-09-28 PROCEDURE — 1159F MED LIST DOCD IN RCRD: CPT | Mod: CPTII,S$GLB,, | Performed by: NEUROLOGICAL SURGERY

## 2021-09-28 PROCEDURE — 1126F PR PAIN SEVERITY QUANTIFIED, NO PAIN PRESENT: ICD-10-PCS | Mod: CPTII,S$GLB,, | Performed by: NEUROLOGICAL SURGERY

## 2021-09-28 PROCEDURE — 3288F PR FALLS RISK ASSESSMENT DOCUMENTED: ICD-10-PCS | Mod: CPTII,S$GLB,, | Performed by: NEUROLOGICAL SURGERY

## 2021-09-28 PROCEDURE — 99999 PR PBB SHADOW E&M-EST. PATIENT-LVL III: CPT | Mod: PBBFAC,,, | Performed by: NEUROLOGICAL SURGERY

## 2021-09-28 PROCEDURE — 99024 PR POST-OP FOLLOW-UP VISIT: ICD-10-PCS | Mod: S$GLB,,, | Performed by: NEUROLOGICAL SURGERY

## 2021-09-28 PROCEDURE — 1101F PR PT FALLS ASSESS DOC 0-1 FALLS W/OUT INJ PAST YR: ICD-10-PCS | Mod: CPTII,S$GLB,, | Performed by: NEUROLOGICAL SURGERY

## 2021-09-28 PROCEDURE — 1101F PT FALLS ASSESS-DOCD LE1/YR: CPT | Mod: CPTII,S$GLB,, | Performed by: NEUROLOGICAL SURGERY

## 2021-09-28 PROCEDURE — 3079F PR MOST RECENT DIASTOLIC BLOOD PRESSURE 80-89 MM HG: ICD-10-PCS | Mod: CPTII,S$GLB,, | Performed by: NEUROLOGICAL SURGERY

## 2021-09-28 PROCEDURE — 99999 PR PBB SHADOW E&M-EST. PATIENT-LVL III: ICD-10-PCS | Mod: PBBFAC,,, | Performed by: NEUROLOGICAL SURGERY

## 2021-09-28 PROCEDURE — 99024 POSTOP FOLLOW-UP VISIT: CPT | Mod: S$GLB,,, | Performed by: NEUROLOGICAL SURGERY

## 2021-09-28 PROCEDURE — 3079F DIAST BP 80-89 MM HG: CPT | Mod: CPTII,S$GLB,, | Performed by: NEUROLOGICAL SURGERY

## 2021-09-28 PROCEDURE — 3288F FALL RISK ASSESSMENT DOCD: CPT | Mod: CPTII,S$GLB,, | Performed by: NEUROLOGICAL SURGERY

## 2021-09-28 PROCEDURE — 3075F PR MOST RECENT SYSTOLIC BLOOD PRESS GE 130-139MM HG: ICD-10-PCS | Mod: CPTII,S$GLB,, | Performed by: NEUROLOGICAL SURGERY

## 2021-09-28 PROCEDURE — 3075F SYST BP GE 130 - 139MM HG: CPT | Mod: CPTII,S$GLB,, | Performed by: NEUROLOGICAL SURGERY

## 2021-10-04 ENCOUNTER — LAB VISIT (OUTPATIENT)
Dept: FAMILY MEDICINE | Facility: CLINIC | Age: 81
End: 2021-10-04
Payer: MEDICARE

## 2021-10-04 ENCOUNTER — PROCEDURE VISIT (OUTPATIENT)
Dept: DERMATOLOGY | Facility: CLINIC | Age: 81
End: 2021-10-04
Payer: MEDICARE

## 2021-10-04 VITALS — HEART RATE: 73 BPM | DIASTOLIC BLOOD PRESSURE: 73 MMHG | SYSTOLIC BLOOD PRESSURE: 116 MMHG

## 2021-10-04 DIAGNOSIS — C44.41 BASAL CELL CARCINOMA OF NECK: Primary | ICD-10-CM

## 2021-10-04 DIAGNOSIS — Z20.822 ENCOUNTER FOR LABORATORY TESTING FOR COVID-19 VIRUS: ICD-10-CM

## 2021-10-04 PROCEDURE — 17311 MOHS 1 STAGE H/N/HF/G: CPT | Mod: S$GLB,,, | Performed by: DERMATOLOGY

## 2021-10-04 PROCEDURE — U0005 INFEC AGEN DETEC AMPLI PROBE: HCPCS | Performed by: INTERNAL MEDICINE

## 2021-10-04 PROCEDURE — U0003 INFECTIOUS AGENT DETECTION BY NUCLEIC ACID (DNA OR RNA); SEVERE ACUTE RESPIRATORY SYNDROME CORONAVIRUS 2 (SARS-COV-2) (CORONAVIRUS DISEASE [COVID-19]), AMPLIFIED PROBE TECHNIQUE, MAKING USE OF HIGH THROUGHPUT TECHNOLOGIES AS DESCRIBED BY CMS-2020-01-R: HCPCS | Performed by: INTERNAL MEDICINE

## 2021-10-04 PROCEDURE — 12042 INTMD RPR N-HF/GENIT2.6-7.5: CPT | Mod: 51,S$GLB,, | Performed by: DERMATOLOGY

## 2021-10-04 PROCEDURE — 99499 NO LOS: ICD-10-PCS | Mod: S$GLB,,, | Performed by: DERMATOLOGY

## 2021-10-04 PROCEDURE — 99499 UNLISTED E&M SERVICE: CPT | Mod: S$GLB,,, | Performed by: DERMATOLOGY

## 2021-10-04 PROCEDURE — 12042 PR LAYR CLOS WND REST BODY 2.6-7.5 CM: ICD-10-PCS | Mod: 51,S$GLB,, | Performed by: DERMATOLOGY

## 2021-10-04 PROCEDURE — 17311: ICD-10-PCS | Mod: S$GLB,,, | Performed by: DERMATOLOGY

## 2021-10-05 ENCOUNTER — PATIENT OUTREACH (OUTPATIENT)
Dept: INFECTIOUS DISEASES | Facility: HOSPITAL | Age: 81
End: 2021-10-05

## 2021-10-05 ENCOUNTER — PATIENT MESSAGE (OUTPATIENT)
Dept: INFECTIOUS DISEASES | Facility: HOSPITAL | Age: 81
End: 2021-10-05

## 2021-10-05 ENCOUNTER — TELEPHONE (OUTPATIENT)
Dept: SURGERY | Facility: HOSPITAL | Age: 81
End: 2021-10-05

## 2021-10-05 DIAGNOSIS — U07.1 COVID-19 VIRUS DETECTED: ICD-10-CM

## 2021-10-05 LAB
SARS-COV-2 RNA RESP QL NAA+PROBE: DETECTED
SARS-COV-2- CYCLE NUMBER: 36

## 2021-10-06 ENCOUNTER — NURSE TRIAGE (OUTPATIENT)
Dept: ADMINISTRATIVE | Facility: CLINIC | Age: 81
End: 2021-10-06

## 2021-10-06 ENCOUNTER — PATIENT OUTREACH (OUTPATIENT)
Dept: INFECTIOUS DISEASES | Facility: HOSPITAL | Age: 81
End: 2021-10-06

## 2021-10-15 ENCOUNTER — CLINICAL SUPPORT (OUTPATIENT)
Dept: DERMATOLOGY | Facility: CLINIC | Age: 81
End: 2021-10-15
Payer: MEDICARE

## 2021-10-15 PROCEDURE — 99999 PR PBB SHADOW E&M-EST. PATIENT-LVL II: ICD-10-PCS | Mod: PBBFAC,,,

## 2021-10-15 PROCEDURE — 99999 PR PBB SHADOW E&M-EST. PATIENT-LVL II: CPT | Mod: PBBFAC,,,

## 2021-10-29 ENCOUNTER — LAB VISIT (OUTPATIENT)
Dept: LAB | Facility: HOSPITAL | Age: 81
End: 2021-10-29
Attending: UROLOGY
Payer: MEDICARE

## 2021-10-29 ENCOUNTER — OFFICE VISIT (OUTPATIENT)
Dept: UROLOGY | Facility: CLINIC | Age: 81
End: 2021-10-29
Payer: MEDICARE

## 2021-10-29 VITALS — WEIGHT: 229.06 LBS | BODY MASS INDEX: 35.95 KG/M2 | HEIGHT: 67 IN

## 2021-10-29 DIAGNOSIS — Z85.46 HISTORY OF PROSTATE CANCER: ICD-10-CM

## 2021-10-29 DIAGNOSIS — Z85.46 HISTORY OF PROSTATE CANCER: Primary | ICD-10-CM

## 2021-10-29 DIAGNOSIS — R35.1 NOCTURIA MORE THAN TWICE PER NIGHT: ICD-10-CM

## 2021-10-29 DIAGNOSIS — R39.12 WEAK URINE STREAM: ICD-10-CM

## 2021-10-29 DIAGNOSIS — R35.0 INCREASED URINARY FREQUENCY: ICD-10-CM

## 2021-10-29 LAB — COMPLEXED PSA SERPL-MCNC: 0.2 NG/ML (ref 0–4)

## 2021-10-29 PROCEDURE — 1159F MED LIST DOCD IN RCRD: CPT | Mod: CPTII,S$GLB,, | Performed by: UROLOGY

## 2021-10-29 PROCEDURE — 99999 PR PBB SHADOW E&M-EST. PATIENT-LVL III: CPT | Mod: PBBFAC,,, | Performed by: UROLOGY

## 2021-10-29 PROCEDURE — 99999 PR PBB SHADOW E&M-EST. PATIENT-LVL III: ICD-10-PCS | Mod: PBBFAC,,, | Performed by: UROLOGY

## 2021-10-29 PROCEDURE — 84153 ASSAY OF PSA TOTAL: CPT | Performed by: UROLOGY

## 2021-10-29 PROCEDURE — 1160F PR REVIEW ALL MEDS BY PRESCRIBER/CLIN PHARMACIST DOCUMENTED: ICD-10-PCS | Mod: CPTII,S$GLB,, | Performed by: UROLOGY

## 2021-10-29 PROCEDURE — 3288F PR FALLS RISK ASSESSMENT DOCUMENTED: ICD-10-PCS | Mod: CPTII,S$GLB,, | Performed by: UROLOGY

## 2021-10-29 PROCEDURE — 3288F FALL RISK ASSESSMENT DOCD: CPT | Mod: CPTII,S$GLB,, | Performed by: UROLOGY

## 2021-10-29 PROCEDURE — 36415 COLL VENOUS BLD VENIPUNCTURE: CPT | Mod: PO | Performed by: UROLOGY

## 2021-10-29 PROCEDURE — 99214 PR OFFICE/OUTPT VISIT, EST, LEVL IV, 30-39 MIN: ICD-10-PCS | Mod: S$GLB,,, | Performed by: UROLOGY

## 2021-10-29 PROCEDURE — 1101F PT FALLS ASSESS-DOCD LE1/YR: CPT | Mod: CPTII,S$GLB,, | Performed by: UROLOGY

## 2021-10-29 PROCEDURE — 1159F PR MEDICATION LIST DOCUMENTED IN MEDICAL RECORD: ICD-10-PCS | Mod: CPTII,S$GLB,, | Performed by: UROLOGY

## 2021-10-29 PROCEDURE — 1101F PR PT FALLS ASSESS DOC 0-1 FALLS W/OUT INJ PAST YR: ICD-10-PCS | Mod: CPTII,S$GLB,, | Performed by: UROLOGY

## 2021-10-29 PROCEDURE — 1160F RVW MEDS BY RX/DR IN RCRD: CPT | Mod: CPTII,S$GLB,, | Performed by: UROLOGY

## 2021-10-29 PROCEDURE — 99214 OFFICE O/P EST MOD 30 MIN: CPT | Mod: S$GLB,,, | Performed by: UROLOGY

## 2021-10-29 PROCEDURE — 1126F AMNT PAIN NOTED NONE PRSNT: CPT | Mod: CPTII,S$GLB,, | Performed by: UROLOGY

## 2021-10-29 PROCEDURE — 1126F PR PAIN SEVERITY QUANTIFIED, NO PAIN PRESENT: ICD-10-PCS | Mod: CPTII,S$GLB,, | Performed by: UROLOGY

## 2021-10-29 RX ORDER — TAMSULOSIN HYDROCHLORIDE 0.4 MG/1
0.8 CAPSULE ORAL DAILY
Qty: 180 CAPSULE | Refills: 3 | Status: SHIPPED | OUTPATIENT
Start: 2021-10-29 | End: 2022-11-01

## 2021-11-01 ENCOUNTER — IMMUNIZATION (OUTPATIENT)
Dept: FAMILY MEDICINE | Facility: CLINIC | Age: 81
End: 2021-11-01
Payer: MEDICARE

## 2021-11-01 DIAGNOSIS — Z23 NEED FOR VACCINATION: Primary | ICD-10-CM

## 2021-11-01 PROCEDURE — 91300 COVID-19, MRNA, LNP-S, PF, 30 MCG/0.3 ML DOSE VACCINE: CPT | Mod: PBBFAC | Performed by: FAMILY MEDICINE

## 2021-11-01 PROCEDURE — 0004A COVID-19, MRNA, LNP-S, PF, 30 MCG/0.3 ML DOSE VACCINE: ICD-10-PCS | Mod: S$GLB,,, | Performed by: FAMILY MEDICINE

## 2021-11-01 PROCEDURE — 0004A COVID-19, MRNA, LNP-S, PF, 30 MCG/0.3 ML DOSE VACCINE: CPT | Mod: S$GLB,,, | Performed by: FAMILY MEDICINE

## 2021-11-02 ENCOUNTER — TELEPHONE (OUTPATIENT)
Dept: GASTROENTEROLOGY | Facility: CLINIC | Age: 81
End: 2021-11-02
Payer: MEDICARE

## 2021-11-03 ENCOUNTER — PATIENT MESSAGE (OUTPATIENT)
Dept: GASTROENTEROLOGY | Facility: CLINIC | Age: 81
End: 2021-11-03
Payer: MEDICARE

## 2021-11-23 ENCOUNTER — TELEPHONE (OUTPATIENT)
Dept: GASTROENTEROLOGY | Facility: CLINIC | Age: 81
End: 2021-11-23
Payer: MEDICARE

## 2021-11-23 ENCOUNTER — ANESTHESIA (OUTPATIENT)
Dept: ENDOSCOPY | Facility: HOSPITAL | Age: 81
End: 2021-11-23
Payer: MEDICARE

## 2021-11-23 ENCOUNTER — HOSPITAL ENCOUNTER (OUTPATIENT)
Facility: HOSPITAL | Age: 81
Discharge: HOME | End: 2021-11-23
Attending: INTERNAL MEDICINE | Admitting: INTERNAL MEDICINE
Payer: MEDICARE

## 2021-11-23 ENCOUNTER — ANESTHESIA EVENT (OUTPATIENT)
Dept: ENDOSCOPY | Facility: HOSPITAL | Age: 81
End: 2021-11-23
Payer: MEDICARE

## 2021-11-23 VITALS
WEIGHT: 229 LBS | OXYGEN SATURATION: 98 % | TEMPERATURE: 97 F | HEIGHT: 67 IN | DIASTOLIC BLOOD PRESSURE: 65 MMHG | HEART RATE: 51 BPM | BODY MASS INDEX: 35.94 KG/M2 | RESPIRATION RATE: 16 BRPM | SYSTOLIC BLOOD PRESSURE: 147 MMHG

## 2021-11-23 DIAGNOSIS — K59.00 CONSTIPATION: Primary | ICD-10-CM

## 2021-11-23 PROCEDURE — D9220A PRA ANESTHESIA: Mod: CRNA,,, | Performed by: NURSE ANESTHETIST, CERTIFIED REGISTERED

## 2021-11-23 PROCEDURE — 37000009 HC ANESTHESIA EA ADD 15 MINS: Performed by: INTERNAL MEDICINE

## 2021-11-23 PROCEDURE — 45378 PR COLONOSCOPY,DIAGNOSTIC: ICD-10-PCS | Mod: 53,,, | Performed by: INTERNAL MEDICINE

## 2021-11-23 PROCEDURE — 25000003 PHARM REV CODE 250: Performed by: INTERNAL MEDICINE

## 2021-11-23 PROCEDURE — 63600175 PHARM REV CODE 636 W HCPCS: Performed by: NURSE ANESTHETIST, CERTIFIED REGISTERED

## 2021-11-23 PROCEDURE — 45378 DIAGNOSTIC COLONOSCOPY: CPT | Mod: 74 | Performed by: INTERNAL MEDICINE

## 2021-11-23 PROCEDURE — D9220A PRA ANESTHESIA: ICD-10-PCS | Mod: ANES,,, | Performed by: ANESTHESIOLOGY

## 2021-11-23 PROCEDURE — D9220A PRA ANESTHESIA: ICD-10-PCS | Mod: CRNA,,, | Performed by: NURSE ANESTHETIST, CERTIFIED REGISTERED

## 2021-11-23 PROCEDURE — 45378 DIAGNOSTIC COLONOSCOPY: CPT | Mod: 53,,, | Performed by: INTERNAL MEDICINE

## 2021-11-23 PROCEDURE — D9220A PRA ANESTHESIA: Mod: ANES,,, | Performed by: ANESTHESIOLOGY

## 2021-11-23 PROCEDURE — 25000003 PHARM REV CODE 250: Performed by: NURSE ANESTHETIST, CERTIFIED REGISTERED

## 2021-11-23 PROCEDURE — 37000008 HC ANESTHESIA 1ST 15 MINUTES: Performed by: INTERNAL MEDICINE

## 2021-11-23 RX ORDER — LIDOCAINE HCL/PF 100 MG/5ML
SYRINGE (ML) INTRAVENOUS
Status: DISCONTINUED | OUTPATIENT
Start: 2021-11-23 | End: 2021-11-23

## 2021-11-23 RX ORDER — SODIUM CHLORIDE 9 MG/ML
INJECTION, SOLUTION INTRAVENOUS CONTINUOUS
Status: DISCONTINUED | OUTPATIENT
Start: 2021-11-23 | End: 2021-11-23 | Stop reason: HOSPADM

## 2021-11-23 RX ORDER — PROPOFOL 10 MG/ML
VIAL (ML) INTRAVENOUS
Status: DISCONTINUED | OUTPATIENT
Start: 2021-11-23 | End: 2021-11-23

## 2021-11-23 RX ADMIN — LIDOCAINE HYDROCHLORIDE 50 MG: 20 INJECTION INTRAVENOUS at 10:11

## 2021-11-23 RX ADMIN — SODIUM CHLORIDE: 0.9 INJECTION, SOLUTION INTRAVENOUS at 09:11

## 2021-11-23 RX ADMIN — PROPOFOL 80 MG: 10 INJECTION, EMULSION INTRAVENOUS at 10:11

## 2021-12-17 ENCOUNTER — TELEPHONE (OUTPATIENT)
Dept: GASTROENTEROLOGY | Facility: CLINIC | Age: 81
End: 2021-12-17
Payer: MEDICARE

## 2022-02-17 ENCOUNTER — PATIENT MESSAGE (OUTPATIENT)
Dept: GASTROENTEROLOGY | Facility: CLINIC | Age: 82
End: 2022-02-17
Payer: MEDICARE

## 2022-02-17 ENCOUNTER — TELEPHONE (OUTPATIENT)
Dept: ENDOSCOPY | Facility: HOSPITAL | Age: 82
End: 2022-02-17
Payer: MEDICARE

## 2022-02-17 ENCOUNTER — TELEPHONE (OUTPATIENT)
Dept: GASTROENTEROLOGY | Facility: CLINIC | Age: 82
End: 2022-02-17
Payer: MEDICARE

## 2022-02-17 DIAGNOSIS — Z01.818 PRE-OP TESTING: ICD-10-CM

## 2022-02-17 NOTE — TELEPHONE ENCOUNTER
----- Message from Kamila France, Patient Care Assistant sent at 2/17/2022  1:28 PM CST -----  Regarding: orders  Contact: pt  Type: Needs Medical Advice  Who Called:  pt   Best Call Back Number: [phone] (home)     Additional Information: pt states she would like a callback regarding orders for an COVID test. Please call pt to advise. Thanks!

## 2022-02-21 ENCOUNTER — LAB VISIT (OUTPATIENT)
Dept: FAMILY MEDICINE | Facility: CLINIC | Age: 82
End: 2022-02-21
Payer: MEDICARE

## 2022-02-21 DIAGNOSIS — Z01.818 PRE-OP TESTING: ICD-10-CM

## 2022-02-21 PROCEDURE — U0003 INFECTIOUS AGENT DETECTION BY NUCLEIC ACID (DNA OR RNA); SEVERE ACUTE RESPIRATORY SYNDROME CORONAVIRUS 2 (SARS-COV-2) (CORONAVIRUS DISEASE [COVID-19]), AMPLIFIED PROBE TECHNIQUE, MAKING USE OF HIGH THROUGHPUT TECHNOLOGIES AS DESCRIBED BY CMS-2020-01-R: HCPCS | Performed by: INTERNAL MEDICINE

## 2022-02-21 PROCEDURE — U0005 INFEC AGEN DETEC AMPLI PROBE: HCPCS | Performed by: INTERNAL MEDICINE

## 2022-02-22 LAB
SARS-COV-2 RNA RESP QL NAA+PROBE: NOT DETECTED
SARS-COV-2- CYCLE NUMBER: NORMAL

## 2022-02-23 ENCOUNTER — ANESTHESIA EVENT (OUTPATIENT)
Dept: ENDOSCOPY | Facility: HOSPITAL | Age: 82
End: 2022-02-23
Payer: MEDICARE

## 2022-02-24 ENCOUNTER — ANESTHESIA (OUTPATIENT)
Dept: ENDOSCOPY | Facility: HOSPITAL | Age: 82
End: 2022-02-24
Payer: MEDICARE

## 2022-02-24 ENCOUNTER — HOSPITAL ENCOUNTER (OUTPATIENT)
Facility: HOSPITAL | Age: 82
Discharge: HOME | End: 2022-02-24
Attending: INTERNAL MEDICINE | Admitting: INTERNAL MEDICINE
Payer: MEDICARE

## 2022-02-24 DIAGNOSIS — K62.5 RECTAL BLEEDING: Primary | ICD-10-CM

## 2022-02-24 PROCEDURE — 63600175 PHARM REV CODE 636 W HCPCS: Mod: PO | Performed by: NURSE ANESTHETIST, CERTIFIED REGISTERED

## 2022-02-24 PROCEDURE — 27201089 HC SNARE, DISP (ANY): Mod: PO | Performed by: INTERNAL MEDICINE

## 2022-02-24 PROCEDURE — 88305 TISSUE EXAM BY PATHOLOGIST: ICD-10-PCS | Mod: 26,,, | Performed by: PATHOLOGY

## 2022-02-24 PROCEDURE — D9220A PRA ANESTHESIA: Mod: CRNA,,, | Performed by: NURSE ANESTHETIST, CERTIFIED REGISTERED

## 2022-02-24 PROCEDURE — D9220A PRA ANESTHESIA: ICD-10-PCS | Mod: CRNA,,, | Performed by: NURSE ANESTHETIST, CERTIFIED REGISTERED

## 2022-02-24 PROCEDURE — 45385 COLONOSCOPY W/LESION REMOVAL: CPT | Mod: PO | Performed by: INTERNAL MEDICINE

## 2022-02-24 PROCEDURE — 37000009 HC ANESTHESIA EA ADD 15 MINS: Mod: PO | Performed by: INTERNAL MEDICINE

## 2022-02-24 PROCEDURE — D9220A PRA ANESTHESIA: Mod: ANES,,, | Performed by: ANESTHESIOLOGY

## 2022-02-24 PROCEDURE — 88305 TISSUE EXAM BY PATHOLOGIST: CPT | Mod: 59 | Performed by: PATHOLOGY

## 2022-02-24 PROCEDURE — 63600175 PHARM REV CODE 636 W HCPCS: Mod: PO | Performed by: INTERNAL MEDICINE

## 2022-02-24 PROCEDURE — 45385 PR COLONOSCOPY,REMV LESN,SNARE: ICD-10-PCS | Mod: ,,, | Performed by: INTERNAL MEDICINE

## 2022-02-24 PROCEDURE — D9220A PRA ANESTHESIA: ICD-10-PCS | Mod: ANES,,, | Performed by: ANESTHESIOLOGY

## 2022-02-24 PROCEDURE — 45380 PR COLONOSCOPY,BIOPSY: ICD-10-PCS | Mod: 59,,, | Performed by: INTERNAL MEDICINE

## 2022-02-24 PROCEDURE — 37000008 HC ANESTHESIA 1ST 15 MINUTES: Mod: PO | Performed by: INTERNAL MEDICINE

## 2022-02-24 PROCEDURE — 45385 COLONOSCOPY W/LESION REMOVAL: CPT | Mod: ,,, | Performed by: INTERNAL MEDICINE

## 2022-02-24 PROCEDURE — 45380 COLONOSCOPY AND BIOPSY: CPT | Mod: 59,PO | Performed by: INTERNAL MEDICINE

## 2022-02-24 PROCEDURE — 88305 TISSUE EXAM BY PATHOLOGIST: CPT | Mod: 26,,, | Performed by: PATHOLOGY

## 2022-02-24 PROCEDURE — 25000003 PHARM REV CODE 250: Mod: PO | Performed by: NURSE ANESTHETIST, CERTIFIED REGISTERED

## 2022-02-24 PROCEDURE — 45380 COLONOSCOPY AND BIOPSY: CPT | Mod: 59,,, | Performed by: INTERNAL MEDICINE

## 2022-02-24 PROCEDURE — 27201012 HC FORCEPS, HOT/COLD, DISP: Mod: PO | Performed by: INTERNAL MEDICINE

## 2022-02-24 RX ORDER — PROPOFOL 10 MG/ML
VIAL (ML) INTRAVENOUS CONTINUOUS PRN
Status: DISCONTINUED | OUTPATIENT
Start: 2022-02-24 | End: 2022-02-24

## 2022-02-24 RX ORDER — SODIUM CHLORIDE 0.9 % (FLUSH) 0.9 %
10 SYRINGE (ML) INJECTION EVERY 6 HOURS PRN
Status: DISCONTINUED | OUTPATIENT
Start: 2022-02-24 | End: 2022-02-24 | Stop reason: HOSPADM

## 2022-02-24 RX ORDER — LIDOCAINE HCL/PF 100 MG/5ML
SYRINGE (ML) INTRAVENOUS
Status: DISCONTINUED | OUTPATIENT
Start: 2022-02-24 | End: 2022-02-24

## 2022-02-24 RX ORDER — SODIUM CHLORIDE, SODIUM LACTATE, POTASSIUM CHLORIDE, CALCIUM CHLORIDE 600; 310; 30; 20 MG/100ML; MG/100ML; MG/100ML; MG/100ML
INJECTION, SOLUTION INTRAVENOUS CONTINUOUS
Status: DISCONTINUED | OUTPATIENT
Start: 2022-02-24 | End: 2022-02-24 | Stop reason: HOSPADM

## 2022-02-24 RX ORDER — PROPOFOL 10 MG/ML
VIAL (ML) INTRAVENOUS
Status: DISCONTINUED | OUTPATIENT
Start: 2022-02-24 | End: 2022-02-24

## 2022-02-24 RX ADMIN — PROPOFOL 100 MCG/KG/MIN: 10 INJECTION, EMULSION INTRAVENOUS at 09:02

## 2022-02-24 RX ADMIN — PROPOFOL 50 MG: 10 INJECTION, EMULSION INTRAVENOUS at 09:02

## 2022-02-24 RX ADMIN — SODIUM CHLORIDE, SODIUM LACTATE, POTASSIUM CHLORIDE, AND CALCIUM CHLORIDE: .6; .31; .03; .02 INJECTION, SOLUTION INTRAVENOUS at 08:02

## 2022-02-24 RX ADMIN — LIDOCAINE HYDROCHLORIDE 100 MG: 20 INJECTION, SOLUTION INTRAVENOUS at 09:02

## 2022-02-24 NOTE — TRANSFER OF CARE
"Anesthesia Transfer of Care Note    Patient: Ramon Reilly    Procedure(s) Performed: Procedure(s) (LRB):  COLONOSCOPY (N/A)    Patient location: PACU    Anesthesia Type: general    Transport from OR: Transported from OR on room air with adequate spontaneous ventilation    Post pain: adequate analgesia    Post assessment: no apparent anesthetic complications and tolerated procedure well    Post vital signs: stable    Level of consciousness: awake and sedated    Nausea/Vomiting: no nausea/vomiting    Complications: none    Transfer of care protocol was followed      Last vitals:   Visit Vitals  BP (!) 104/52   Pulse 71   Temp 36.2 °C (97.1 °F) (Skin)   Resp 18   Ht 5' 8.5" (1.74 m)   Wt 99.8 kg (220 lb)   SpO2 99%   BMI 32.96 kg/m²     "

## 2022-02-24 NOTE — ANESTHESIA POSTPROCEDURE EVALUATION
Anesthesia Post Evaluation    Patient: Ramon Reilly    Procedure(s) Performed: Procedure(s) (LRB):  COLONOSCOPY (N/A)    Final Anesthesia Type: general      Patient location during evaluation: PACU  Patient participation: Yes- Able to Participate  Level of consciousness: awake and alert  Post-procedure vital signs: reviewed and stable  Pain management: adequate  Airway patency: patent    PONV status at discharge: No PONV  Anesthetic complications: no      Cardiovascular status: blood pressure returned to baseline  Respiratory status: unassisted  Hydration status: euvolemic  Follow-up not needed.          Vitals Value Taken Time   /69 02/24/22 1025   Temp 36.1 °C (97 °F) 02/24/22 1000   Pulse 66 02/24/22 1025   Resp 15 02/24/22 1025   SpO2 98 % 02/24/22 1025         Event Time   Out of Recovery 10:30:00         Pain/Triston Score: [place]iston Score: 10 (2/24/2022 10:30 AM)

## 2022-02-24 NOTE — TRANSFER OF CARE
"Anesthesia Transfer of Care Note    Patient: Ramon Reilly    Procedure(s) Performed: Procedure(s) (LRB):  COLONOSCOPY (N/A)    Patient location: PACU    Anesthesia Type: general    Transport from OR: Transported from OR on room air with adequate spontaneous ventilation    Post pain: adequate analgesia    Post assessment: no apparent anesthetic complications and tolerated procedure well    Post vital signs: stable    Level of consciousness: sedated    Nausea/Vomiting: no nausea/vomiting    Complications: none    Transfer of care protocol was followed      Last vitals:   Visit Vitals  BP (!) 104/52   Pulse 71   Temp 36.2 °C (97.1 °F) (Skin)   Resp 18   Ht 5' 8.5" (1.74 m)   Wt 99.8 kg (220 lb)   SpO2 99%   BMI 32.96 kg/m²     "

## 2022-02-24 NOTE — PROVATION PATIENT INSTRUCTIONS
Discharge Summary/Instructions after an Endoscopic Procedure  Patient Name: Ramon Reilly  Patient MRN: [id number]  Patient YOB: 1940  Thursday, February 24, 2022  Elliott Street MD  Dear patient,  As a result of recent federal legislation (The Federal Cures Act), you may   receive lab or pathology results from your procedure in your [facility]   account before your physician is able to contact you. Your physician or   their representative will relay the results to you with their   recommendations at their soonest availability.  Thank you,  RESTRICTIONS:  During your procedure today, you received medications for sedation.  These   medications may affect your judgment, balance and coordination.  Therefore,   for 24 hours, you have the following restrictions:   - DO NOT drive a car, operate machinery, make legal/financial decisions,   sign important papers or drink alcohol.    ACTIVITY:  Today: no heavy lifting, straining or running due to procedural   sedation/anesthesia.  The following day: return to full activity including work.  DIET:  Eat and drink normally unless instructed otherwise.     TREATMENT FOR COMMON SIDE EFFECTS:  - Mild abdominal pain, nausea, belching, bloating or excessive gas:  rest,   eat lightly and use a heating pad.  - Sore Throat: treat with throat lozenges and/or gargle with warm salt   water.  - Because air was used during the procedure, expelling large amounts of air   from your rectum or belching is normal.  - If a bowel prep was taken, you may not have a bowel movement for 1-3 days.    This is normal.  SYMPTOMS TO WATCH FOR AND REPORT TO YOUR PHYSICIAN:  1. Abdominal pain or bloating, other than gas cramps.  2. Chest pain.  3. Back pain.  4. Signs of infection such as: chills or fever occurring within 24 hours   after the procedure.  5. Rectal bleeding, which would show as bright red, maroon, or black stools.   (A tablespoon of blood from the rectum is not serious, especially  if   hemorrhoids are present.)  6. Vomiting.  7. Weakness or dizziness.  GO DIRECTLY TO THE NEAREST EMERGENCY ROOM IF YOU HAVE ANY OF THE FOLLOWING:      Difficulty breathing              Chills and/or fever over 101 F   Persistent vomiting and/or vomiting blood   Severe abdominal pain   Severe chest pain   Black, tarry stools   Bleeding- more than one tablespoon   Any other symptom or condition that you feel may need urgent attention  Your doctor recommends these additional instructions:  If any biopsies were taken, your doctors clinic will contact you in 1 to 2   weeks with any results.  You are being discharged to home.   Advance your diet as tolerated.   Continue your present medications.   We are waiting for your pathology results.   Your physician has indicated that a repeat colonoscopy is not recommended   for surveillance.  For questions, problems or results please call your physician - Elliott Street MD at Work:  [phone].  EMERGENCY PHONE NUMBER: [phone], LAB RESULTS: [phone]  IF A COMPLICATION OR EMERGENCY SITUATION ARISES AND YOU ARE UNABLE TO REACH   YOUR PHYSICIAN - GO DIRECTLY TO THE EMERGENCY ROOM.  ___________________________________________  Nurse Signature  ___________________________________________  Patient/Designated Responsible Party Signature  Elliott Street MD  2/24/2022 9:58:20 AM  This report has been verified and signed electronically.  Dear patient,  As a result of recent federal legislation (The Federal Cures Act), you may   receive lab or pathology results from your procedure in your [facility]   account before your physician is able to contact you. Your physician or   their representative will relay the results to you with their   recommendations at their soonest availability.  Thank you.  PROVATION

## 2022-02-24 NOTE — H&P
History & Physical - Short Stay  Gastroenterology      SUBJECTIVE:     Procedure: Colonoscopy    Chief Complaint/Indication for Procedure: Rectal Bleeding    PTA Medications   Medication Sig    acetaminophen (TYLENOL) 500 MG tablet Take 500 mg by mouth every 6 (six) hours as needed for Pain.    calcium carbonate (OS-IDA) 500 mg calcium (1,250 mg) tablet Take 1 tablet (500 mg total) by mouth once daily.    cholecalciferol, vitamin D3, (VITAMIN D3) 25 mcg (1,000 unit) capsule Take 1,000 Units by mouth once daily.    multivitamin capsule Take 1 capsule by mouth once daily.    tamsulosin (FLOMAX) 0.4 mg Cap Take 2 capsules (0.8 mg total) by mouth once daily. Take at bedtime       Review of patient's allergies indicates:  No Known Allergies     Past Medical History:   Diagnosis Date    Arthritis     BPH (benign prostatic hyperplasia)     Cancer     prostate    Chronic constipation     History of kidney stones     Knee pain     Neurogenic claudication     Rectal bleeding     Squamous cell carcinoma of skin 04/30/2018    L wrist tx w/ Excision    Urinary frequency     Uses roller walker     Weakness of both legs     Wears glasses      Past Surgical History:   Procedure Laterality Date    BIOPSY OF ABDOMINAL WALL N/A 7/31/2018    Procedure: BIOPSY, ABDOMINAL WALL;  Surgeon: Corky Paiz MD;  Location: [facility] OR;  Service: General;  Laterality: N/A;    CHOLECYSTECTOMY      COLONOSCOPY N/A 6/21/2018    Procedure: COLONOSCOPY;  Surgeon: Sally Aggarwal MD;  Location: [facility];  Service: Endoscopy;  Laterality: N/A;    COLONOSCOPY N/A 11/23/2021    Procedure: COLONOSCOPY;  Surgeon: Elliott Street MD;  Location: [facility] ENDO;  Service: Endoscopy;  Laterality: N/A;    CYSTOSCOPY N/A 6/4/2018    Procedure: CYSTOSCOPY;  Surgeon: Christie Bruce MD;  Location: [facility] OR;  Service: Urology;  Laterality: N/A;    DIAGNOSTIC LAPAROSCOPY N/A 7/31/2018    Procedure: LAPAROSCOPY, DIAGNOSTIC;  Surgeon:  Corky Paiz MD;  Location: [facility] OR;  Service: General;  Laterality: N/A;  diagnostic lap, biopsy of abdominal wall mass    EYE SURGERY Right     eye lid surgery     HERNIA REPAIR      LAMINECTOMY USING MINIMALLY INVASIVE TECHNIQUE Bilateral 2021    Procedure: BILATERAL L4-5 LAMINECTOMY, SPINE, MINIMALLY INVASIVE;  Surgeon: Bryant Arroyo MD;  Location: [facility] OR;  Service: Neurosurgery;  Laterality: Bilateral;    TONSILLECTOMY, ADENOIDECTOMY      TRANSRECTAL BIOPSY OF PROSTATE WITH ULTRASOUND GUIDANCE N/A 2018    Procedure: TRANSRECTAL ULTRASOUND BIOPSY;  Surgeon: Christie Bruce MD;  Location: [facility] OR;  Service: Urology;  Laterality: N/A;    TRANSRECTAL ULTRASOUND EXAMINATION N/A 2018    Procedure: GOLD MARKERS-;  Surgeon: Christie Bruce MD;  Location: [facility] OR;  Service: Urology;  Laterality: N/A;  TRUS GOLD MARKERS      WISDOM TOOTH EXTRACTION       Family History   Problem Relation Age of Onset    Cancer Mother         breast    Stroke Father     Eczema Neg Hx     Lupus Neg Hx     Psoriasis Neg Hx     Melanoma Neg Hx      Social History     Tobacco Use    Smoking status: Former Smoker     Quit date: 3/12/1988     Years since quittin.9    Smokeless tobacco: Never Used   Substance Use Topics    Alcohol use: Not Currently    Drug use: No     OBJECTIVE:       Physical Exam:                                                       GENERAL:  Comfortable, in no acute distress.                                 HEENT EXAM:  Nonicteric.  No adenopathy.  Oropharynx is clear.               NECK:  Supple.                                                               LUNGS:  Clear.                                                               CARDIAC:  Regular rate and rhythm.  S1, S2.  No murmur.                      ABDOMEN:  Soft, positive bowel sounds, nontender.  No hepatosplenomegaly or masses.  No rebound or guarding.                                              EXTREMITIES:  No edema.     MENTAL STATUS:  Normal, alert and oriented.      ASSESSMENT/PLAN:     Assessment: Rectal Bleeding    Plan: Colonoscopy

## 2022-02-24 NOTE — ANESTHESIA PREPROCEDURE EVALUATION
02/24/2022  Ramon Reilly is a 81 y.o., male.    Pre-op Assessment    I have reviewed the Patient Summary Reports.    I have reviewed the Nursing Notes.       Review of Systems  Anesthesia Hx:  No problems with previous Anesthesia    Renal/:   Chronic Renal Disease    Neurological:   Neuromuscular Disease,        Physical Exam  General:  Well nourished and Obesity      Airway/Jaw/Neck:  Airway Findings: Mouth Opening: Normal   Tongue: Normal   TM Distance: Normal, at least 6 cm   Jaw/Neck Findings: Neck ROM: Normal ROM      Eyes/Ears/Nose:  Eyes/Ears/Nose Findings:    Dental:  Dental Findings:   Chest/Lungs:  Chest/Lungs Findings: Normal Respiratory Rate      Heart/Vascular:  Heart Findings: Rate: Normal  Rhythm: Regular Rhythm        Mental Status:  Mental Status Findings:  Cooperative, Alert and Oriented         Anesthesia Plan  Type of Anesthesia, risks & benefits discussed:  Anesthesia Type:  general    Patient's Preference: General  Plan Factors:          Intra-op Monitoring Plan: standard ASA monitors  Intra-op Monitoring Plan Comments:   Post Op Pain Control Plan:   Post Op Pain Control Plan Comments:     Induction:   IV  Beta Blocker:  Patient is not currently on a Beta-Blocker (No further documentation required).       Informed Consent: Informed consent signed with the Patient and all parties understand the risks and agree with anesthesia plan.  All questions answered.  Anesthesia consent signed with patient.  ASA Score: 3     Day of Surgery Review of History & Physical:              Ready For Surgery From Anesthesia Perspective.           Physical Exam  General: Well nourished and Obesity    Airway:  Mouth Opening: Normal  TM Distance: Normal, at least 6 cm  Tongue: Normal  Neck ROM: Normal ROM    Chest/Lungs:  Normal Respiratory Rate    Heart:  Rate: Normal  Rhythm: Regular  Rhythm          Anesthesia Plan  Type of Anesthesia, risks & benefits discussed:    Anesthesia Type: general  Intra-op Monitoring Plan: standard ASA monitors  Induction:  IV  Informed Consent: Informed consent signed with the Patient and all parties understand the risks and agree with anesthesia plan.  All questions answered.   ASA Score: 3    Ready For Surgery From Anesthesia Perspective.       .

## 2022-02-25 VITALS
TEMPERATURE: 97 F | SYSTOLIC BLOOD PRESSURE: 130 MMHG | DIASTOLIC BLOOD PRESSURE: 69 MMHG | BODY MASS INDEX: 32.58 KG/M2 | HEART RATE: 66 BPM | OXYGEN SATURATION: 98 % | HEIGHT: 69 IN | WEIGHT: 220 LBS | RESPIRATION RATE: 15 BRPM

## 2022-03-02 NOTE — PROGRESS NOTES
"    For your bladder, please try to avoid caffeine, alcohol, diet drinks/artificial sugars, spicy foods.    Try to re-train your bladder:    These methods are to help you regain bladder control. Think of it as \"mind over bladder.\"   Start by going to the toilet and trying to urinate as often as your shortest voiding interval (the length of time between trips to the bathroom) based on your voiding diary. For example, go every hour if that is what your bladder diary indicates is the shortest interval of time between visits to urinate. Make these regular trips to the toilet while you are awake. You do not have to get up during the night!   You must try to urinate whether you feel the need or not. You must try to urinate even if you have just been incontinent.   If you get a strong urge to go to the bathroom before your scheduled time, use distraction or relaxation:  Stop, do not run to the bathroom!  Stand still or sit down if you can.  RELAX. Take a deep breath and let it out slowly.  Concentrate on making the urge decrease or even go away anyway you can (imagine the pressure becoming less and less). You can also try doing quick contractions of your pelvic floor muscles.  DISTRACT yourself, for example by doing math problems in your head.  When you feel IN CONTROL OF YOUR BLADDER, walk slowly to the bathroom, and then go.   Keep this schedule until you can go one day without urine leakage. Then, increase the time between scheduled trips to the toilet by 15 minutes. When you can go one day on this new schedule without urine leakage, extend the time between bathroom trips again by 15 minutes.   Keep this up until you can go four hours between trips to the toilet (which is NORMAL), or until you are comfortable with a shorter time interval. This may take several weeks.   DO NOT GET DISCOURAGED! Bladder training takes time and effort, but it is an effective way to get rid of incontinence without medication or surgery. " "  Physical Therapy Daily Treatment Note     Name: Ramon Reilly  Clinic Number: [id number]    Therapy Diagnosis:   Encounter Diagnoses   Name Primary?    Chronic pain of left knee     Gait instability      Physician: Amadeo Delatorre DO    Visit Date: 9/26/2019  Physician Orders: PT Eval and Treat  Medical Diagnosis from Referral: Acute pain of left knee   Evaluation Date: 9/19/2019  Authorization Period Expiration: 12/31/2019   Plan of Care Expiration: 11/15/2019  Visit # / Visits authorized: 3/ 20     Time In: 1105  Time Out:  1200  Total Billable Time: 27 minutes     Precautions: Standard and cancer    Subjective     Pt reports: Pain in the knee starts after he has been walking for awhile.  Pt reports he is able to walk about half a football field before he starts having pain.  He was compliant with home exercise program.  Did exercises every day.  Response to previous treatment: soreness  Functional change: too soon to tell    Pain: 0/10  Location: left knee     Objective     Ramon received therapeutic exercises to develop strength, endurance, ROM and core stabilization for 45 minutes including:  Quad sets 2 x10 (3 second hold)  Heel slides 2 x 10 (3 second hold)  SLR x 20  Bridges x 20  Clamshells GTB 2 x 10 each  Prone quad stretch with strap 3 x 30 sec  HS stretch with strap 3 x 30 sec    Leg press #60 x 20  Standing hip abduction x 20  Standing hip extension x 20  Mini squats x 10    * attempted recumbent bike but had a " twinge" with extension with every revolution so bike was halted       Ramon received the following manual therapy techniques: Joint mobilizations were applied to the: L knee for 5 minutes, including:  Patella mobs      Home Exercises Provided and Patient Education Provided     Education provided:   - HEP  - arthritis pain, degeneration, role of therapy in strengthening and endurance goals    Written Home Exercises Provided: Patient instructed to cont prior HEP.  Exercises were "           Consider getting a shingles vaccine.        Patient Education   Personalized Prevention Plan  You are due for the preventive services outlined below.  Your care team is available to assist you in scheduling these services.  If you have already completed any of these items, please share that information with your care team to update in your medical record.  Health Maintenance Due   Topic Date Due     Discuss Advance Care Planning  Never done     Depression Action Plan  Never done     Hepatitis C Screening  Never done     Zoster (Shingles) Vaccine (1 of 2) Never done     Depression Assessment  07/04/2021     Annual Wellness Visit  01/04/2022     FALL RISK ASSESSMENT  01/04/2022     Preventive Health Recommendations    See your health care provider every year to    Review health changes.     Discuss preventive care.      Review your medicines if your doctor has prescribed any.    You no longer need a yearly Pap test unless you've had an abnormal Pap test in the past 10 years. If you have vaginal symptoms, such as bleeding or discharge, be sure to talk with your provider about a Pap test.    Every 1 to 2 years, have a mammogram.  If you are over 69, talk with your health care provider about whether or not you want to continue having screening mammograms.    Every 10 years, have a colonoscopy. Or, have a yearly FIT test (stool test). These exams will check for colon cancer.     Have a cholesterol test every 5 years, or more often if your doctor advises it.     Have a diabetes test (fasting glucose) every three years. If you are at risk for diabetes, you should have this test more often.     At age 65, have a bone density scan (DEXA) to check for osteoporosis (brittle bone disease).    Shots:    Get a flu shot each year.    Get a tetanus shot every 10 years.    Talk to your doctor about your pneumonia vaccines. There are now two you should receive - Pneumovax (PPSV 23) and Prevnar (PCV 13).    Talk to your  reviewed and Ramon was able to demonstrate them prior to the end of the session.  Ramon demonstrated good  understanding of the education provided.     See EMR under Patient Instructions for exercises provided 9/24/2019.    Assessment     Pt tolerated treatment very well with no increase in symptoms.  Pt able to tolerate additional reps, weight, and mini squat exercise.  Pt with questions today regarding knee replacement, injections, and other medical interventions.  Pt educated and instructed on the importance of quad strengthening and how LE strengthening can help with his pain.  Pt and wife verbalized understanding.  Ramon is progressing well towards his goals.   Pt prognosis is Good.     Pt will continue to benefit from skilled outpatient physical therapy to address the deficits listed in the problem list box on initial evaluation, provide pt/family education and to maximize pt's level of independence in the home and community environment.     Pt's spiritual, cultural and educational needs considered and pt agreeable to plan of care and goals.    Anticipated barriers to physical therapy: None    Goals:   Short-Term Goals: 3 weeks  - The patient will be independent with initial home exercise program.  - The patient will increase strength to at least 4+/5 to perform functional mobility including walking with pain <2/10  - The patient will increase ROM grossly to 0 to 130 degrees to perform ambulation with pain < 2/10.     Long-Term Goals: 6 weeks  - Pt to achieve <37% limitation as measured by the FOTO to demonstrate decreased disability.  - The patient will be independent with home exercise program and symptom management.  - The patient will be independent amb with no assistive device on all surfaces for community distances.  - The patient will increase strength to at least 5/5 to perform functional mobility including ascending/descending stairs with pain <2/10.  - The patient will perform SLS for >30s to improve  his stability and balance with gait.    Plan     Continue with REID Gómez, PTA   pharmacist about the shingles vaccine.    Talk to your doctor about the hepatitis B vaccine.    Nutrition:     Eat at least 5 servings of fruits and vegetables each day.    Eat whole-grain bread, whole-wheat pasta and brown rice instead of white grains and rice.    Get adequate Calcium and Vitamin D.     Lifestyle    Exercise at least 150 minutes a week (30 minutes a day, 5 days a week). This will help you control your weight and prevent disease.    Limit alcohol to one drink per day.    No smoking.     Wear sunscreen to prevent skin cancer.     See your dentist twice a year for an exam and cleaning.    See your eye doctor every 1 to 2 years to screen for conditions such as glaucoma, macular degeneration and cataracts.    Personalized Prevention Plan  You are due for the preventive services outlined below.  Your care team is available to assist you in scheduling these services.  If you have already completed any of these items, please share that information with your care team to update in your medical record.  Health Maintenance   Topic Date Due     ADVANCE CARE PLANNING  Never done     DEPRESSION ACTION PLAN  Never done     HEPATITIS C SCREENING  Never done     ZOSTER IMMUNIZATION (1 of 2) Never done     PHQ-9  07/04/2021     MEDICARE ANNUAL WELLNESS VISIT  01/04/2022     FALL RISK ASSESSMENT  01/04/2022     MAMMO SCREENING  07/07/2022     LIPID  01/04/2026     COLORECTAL CANCER SCREENING  04/10/2029     DTAP/TDAP/TD IMMUNIZATION (3 - Td or Tdap) 11/15/2031     DEXA  04/04/2034     INFLUENZA VACCINE  Completed     Pneumococcal Vaccine: 65+ Years  Completed     COVID-19 Vaccine  Completed     IPV IMMUNIZATION  Aged Out     MENINGITIS IMMUNIZATION  Aged Out     HEPATITIS B IMMUNIZATION  Aged Out       Thank you for visiting the Primary Care Center today at the [facility]! The following is some information about our clinic:     Primary Care Center Frequently-Asked Questions    (1) How do I schedule  appointments at the [facility]?     Primary Care--to schedule or make changes to an existing appointment, please call our primary care line at [phone].    Labs--to schedule a lab appointment at the [facility] you can use [facility] or call [phone]. If you have a [place] location that is closer to home, you can reach out to that location for scheduling options.     Imaging--if you need to schedule a CT, X-ray, MRI, ultrasound, or other imaging study you can call [phone] to schedule at the [facility] or any other [facility] imaging location.     Referrals--if a referral to another specialty was ordered you can expect a phone call from their scheduling team. If you have not heard from them in a week, please call us or send us a [facility] message to check the status or get a scheduling number. Please note that this only applies to internal [facility] referrals. If the referral is external you would need to contact their office for scheduling.     (2) I have a question about my visit, who do I contact?     You can call us at the primary care line at [phone] to ask questions about your visit. You can also send a secure message through [facility], which is reviewed by clinic staff. Please note that [employer] messages have a twenty-four to forty-eight business hour turnaround time and should not be used for urgent concerns.    (3) How will I get the results of my tests?    If you are signed up for [facility] all tests will be released to you within twenty-four hours of resulting. Please allow three to five days for your doctor to review your results and place a note interpreting the results. If you do not have [employer] you will receive your results through mail seven to ten business days following the return of the tests. Please note that if there should be any urgent or concerning results that your doctor or their registered nurse will reach out to you  the same day as the tests come back. If you have follow up questions about your results or would like to discuss the results in detail please schedule a follow up with your provider either in person or virtually.     (4) How do I get refills of my prescriptions?     You should always first contact your pharmacy for refills of your medications. If submitting a refill request on [facility], please be sure to submit the request only once--repeat requests can cause delays in refill. If you are requesting a NEW medication or a medication related to new symptoms you will need to schedule an appointment with a provider prior to approval. Please note: Routine medication refills have up to one to three business day turnaround whereas controlled substances refills have up to five to seven business day turnaround.    (5) I have new symptoms, what do I do?     If you are having an immediate medical emergency, you should dial 911 for assistance.   For anything urgent that needs to be seen within a few hours to one day you should visit a local urgent care for assistance.  For non-urgent symptoms that need to be seen within a few days to a week you can schedule with an available provider in primary care by going to [facility] or calling [phone].   If you are not sure how serious your symptoms are or you would like to receive medical advice you can always call [phone] to speak with a triage nurse.

## 2022-03-07 ENCOUNTER — TELEPHONE (OUTPATIENT)
Dept: GASTROENTEROLOGY | Facility: CLINIC | Age: 82
End: 2022-03-07
Payer: MEDICARE

## 2022-03-07 LAB
FINAL PATHOLOGIC DIAGNOSIS: NORMAL
Lab: NORMAL

## 2022-03-07 NOTE — TELEPHONE ENCOUNTER
----- Message from Rebekah Yao sent at 3/7/2022  2:34 PM CST -----  Contact: Patient  Type:  Needs Medical Advice    Who Called:  Patient      Would the patient rather a call back or a response via [facility]?  Call    Best Call Back Number:  [phone] (home)     Additional Information:  Patient needs to speak top the nurse about his colonoscopy he had done on 02/24   Patient needs to know  if he needs a follow up and patient never got any info on the results of colonoscopy

## 2022-05-31 ENCOUNTER — TELEPHONE (OUTPATIENT)
Dept: UROLOGY | Facility: CLINIC | Age: 82
End: 2022-05-31
Payer: MEDICARE

## 2022-05-31 ENCOUNTER — TELEPHONE (OUTPATIENT)
Dept: FAMILY MEDICINE | Facility: CLINIC | Age: 82
End: 2022-05-31
Payer: MEDICARE

## 2022-05-31 DIAGNOSIS — R73.03 PREDIABETES: Primary | ICD-10-CM

## 2022-05-31 DIAGNOSIS — E78.5 HYPERLIPIDEMIA, UNSPECIFIED HYPERLIPIDEMIA TYPE: ICD-10-CM

## 2022-05-31 DIAGNOSIS — Z85.46 HISTORY OF PROSTATE CANCER: Primary | ICD-10-CM

## 2022-05-31 NOTE — TELEPHONE ENCOUNTER
----- Message from Marissa Osborn sent at 5/31/2022 10:14 AM CDT -----  Contact: self  Patient has appt on 6/13 and wants to have his psa lab done prior.  Please call back after orders are placed so he can have them done before then at [phone] (home) and thanks

## 2022-05-31 NOTE — TELEPHONE ENCOUNTER
----- Message from Marissa Osborn sent at 5/31/2022 10:12 AM CDT -----  Contact: awld  Patient has her 6 mo checkup on 6/10 and wants to know if the doctor wants labs done prior call back at [phone] (home) if no answer please leave msg thanks

## 2022-06-06 ENCOUNTER — LAB VISIT (OUTPATIENT)
Dept: LAB | Facility: HOSPITAL | Age: 82
End: 2022-06-06
Attending: UROLOGY
Payer: MEDICARE

## 2022-06-06 DIAGNOSIS — Z85.46 HISTORY OF PROSTATE CANCER: ICD-10-CM

## 2022-06-06 DIAGNOSIS — R73.03 PREDIABETES: ICD-10-CM

## 2022-06-06 DIAGNOSIS — E78.5 HYPERLIPIDEMIA, UNSPECIFIED HYPERLIPIDEMIA TYPE: ICD-10-CM

## 2022-06-06 LAB
ALBUMIN SERPL BCP-MCNC: 4.3 G/DL (ref 3.5–5.2)
ALP SERPL-CCNC: 68 U/L (ref 55–135)
ALT SERPL W/O P-5'-P-CCNC: 14 U/L (ref 10–44)
ANION GAP SERPL CALC-SCNC: 12 MMOL/L (ref 8–16)
AST SERPL-CCNC: 21 U/L (ref 10–40)
BILIRUB SERPL-MCNC: 0.7 MG/DL (ref 0.1–1)
BUN SERPL-MCNC: 14 MG/DL (ref 8–23)
CALCIUM SERPL-MCNC: 9.8 MG/DL (ref 8.7–10.5)
CHLORIDE SERPL-SCNC: 101 MMOL/L (ref 95–110)
CHOLEST SERPL-MCNC: 190 MG/DL (ref 120–199)
CHOLEST/HDLC SERPL: 3.8 {RATIO} (ref 2–5)
CO2 SERPL-SCNC: 26 MMOL/L (ref 23–29)
COMPLEXED PSA SERPL-MCNC: 0.23 NG/ML (ref 0–4)
CREAT SERPL-MCNC: 1.1 MG/DL (ref 0.5–1.4)
EST. GFR  (AFRICAN AMERICAN): >60 ML/MIN/1.73 M^2
EST. GFR  (NON AFRICAN AMERICAN): >60 ML/MIN/1.73 M^2
ESTIMATED AVG GLUCOSE: 103 MG/DL (ref 68–131)
GLUCOSE SERPL-MCNC: 86 MG/DL (ref 70–110)
HBA1C MFR BLD: 5.2 % (ref 4–5.6)
HDLC SERPL-MCNC: 50 MG/DL (ref 40–75)
HDLC SERPL: 26.3 % (ref 20–50)
LDLC SERPL CALC-MCNC: 109 MG/DL (ref 63–159)
NONHDLC SERPL-MCNC: 140 MG/DL
POTASSIUM SERPL-SCNC: 4.9 MMOL/L (ref 3.5–5.1)
PROT SERPL-MCNC: 7.2 G/DL (ref 6–8.4)
SODIUM SERPL-SCNC: 139 MMOL/L (ref 136–145)
TRIGL SERPL-MCNC: 155 MG/DL (ref 30–150)

## 2022-06-06 PROCEDURE — 36415 COLL VENOUS BLD VENIPUNCTURE: CPT | Mod: PO | Performed by: UROLOGY

## 2022-06-06 PROCEDURE — 84153 ASSAY OF PSA TOTAL: CPT | Performed by: UROLOGY

## 2022-06-06 PROCEDURE — 80061 LIPID PANEL: CPT | Performed by: INTERNAL MEDICINE

## 2022-06-06 PROCEDURE — 80053 COMPREHEN METABOLIC PANEL: CPT | Performed by: INTERNAL MEDICINE

## 2022-06-06 PROCEDURE — 83036 HEMOGLOBIN GLYCOSYLATED A1C: CPT | Performed by: INTERNAL MEDICINE

## 2022-06-10 ENCOUNTER — OFFICE VISIT (OUTPATIENT)
Dept: FAMILY MEDICINE | Facility: CLINIC | Age: 82
End: 2022-06-10
Payer: MEDICARE

## 2022-06-10 ENCOUNTER — TELEPHONE (OUTPATIENT)
Dept: FAMILY MEDICINE | Facility: CLINIC | Age: 82
End: 2022-06-10

## 2022-06-10 DIAGNOSIS — K59.09 CHRONIC CONSTIPATION: Primary | ICD-10-CM

## 2022-06-10 PROCEDURE — 99213 OFFICE O/P EST LOW 20 MIN: CPT | Mod: 95,,, | Performed by: INTERNAL MEDICINE

## 2022-06-10 PROCEDURE — 1159F PR MEDICATION LIST DOCUMENTED IN MEDICAL RECORD: ICD-10-PCS | Mod: CPTII,95,, | Performed by: INTERNAL MEDICINE

## 2022-06-10 PROCEDURE — 99213 PR OFFICE/OUTPT VISIT, EST, LEVL III, 20-29 MIN: ICD-10-PCS | Mod: 95,,, | Performed by: INTERNAL MEDICINE

## 2022-06-10 PROCEDURE — 1159F MED LIST DOCD IN RCRD: CPT | Mod: CPTII,95,, | Performed by: INTERNAL MEDICINE

## 2022-06-10 PROCEDURE — 1160F PR REVIEW ALL MEDS BY PRESCRIBER/CLIN PHARMACIST DOCUMENTED: ICD-10-PCS | Mod: CPTII,95,, | Performed by: INTERNAL MEDICINE

## 2022-06-10 PROCEDURE — 1160F RVW MEDS BY RX/DR IN RCRD: CPT | Mod: CPTII,95,, | Performed by: INTERNAL MEDICINE

## 2022-06-10 NOTE — TELEPHONE ENCOUNTER
----- Message from Amadeo Delatorre DO sent at 6/10/2022  1:54 PM CDT -----  Follow-up 3 weeks with appointment immediately before or after appointment with his wife.

## 2022-06-10 NOTE — PROGRESS NOTES
Patient ID: Ramon Reilly is a 81 y.o. male.    Chief Complaint: No chief complaint on file.    Visit type: VIRTUAL    Assessment and Plan      Stop Metamucil, start Linzess by itself.  If somewhat effective can add back the Metamucil.  Can also take 2 capsules of Linzess for better results if needed.    1. Chronic constipation  linaCLOtide (LINZESS) 72 mcg Cap capsule      HPI     Hyperlipidemia, history of prostate cancer status post radiation, class 2 obesity, colonic polyps    Follow-up    Constipation- if he takes miralax has bowel leakage. Takes metamucil TID. Still has to strain. Had colonoscopy- had 2 polyps and likely radiation injury.  Linzess was prescribed in the past but he does not think he took it because he was worried about blockage.  This recent colonoscopy ruled that out.     Review of Systems   Constitutional: Negative for activity change and unexpected weight change.   HENT: Negative for hearing loss, rhinorrhea and trouble swallowing.    Eyes: Positive for visual disturbance. Negative for discharge.   Respiratory: Negative for chest tightness and wheezing.    Cardiovascular: Negative for chest pain and palpitations.   Gastrointestinal: Positive for constipation. Negative for blood in stool, diarrhea and vomiting.   Endocrine: Positive for polyuria. Negative for polydipsia.   Genitourinary: Negative for difficulty urinating, hematuria and urgency.   Musculoskeletal: Negative for arthralgias, joint swelling and neck pain.   Neurological: Positive for weakness. Negative for headaches.   Psychiatric/Behavioral: Negative for confusion and dysphoric mood.     Medication List with Changes/Refills   New Medications    LINACLOTIDE (LINZESS) 72 MCG CAP CAPSULE    Take 1 capsule (72 mcg total) by mouth before breakfast.   Current Medications    ACETAMINOPHEN (TYLENOL) 500 MG TABLET    Take 500 mg by mouth every 6 (six) hours as needed for Pain.    CALCIUM CARBONATE (OS-IDA) 500 MG CALCIUM (1,250  MG) TABLET    Take 1 tablet (500 mg total) by mouth once daily.    CHOLECALCIFEROL, VITAMIN D3, (VITAMIN D3) 25 MCG (1,000 UNIT) CAPSULE    Take 1,000 Units by mouth once daily.    MULTIVITAMIN CAPSULE    Take 1 capsule by mouth once daily.    TAMSULOSIN (FLOMAX) 0.4 MG CAP    Take 2 capsules (0.8 mg total) by mouth once daily. Take at bedtime        The patient location is:  [place]  The chief complaint leading to consultation is:  Follow-up and constipation  Face to Face time with patient:  23 minutes  minutes of total time spent on the encounter, which includes face to face time and   non-face to face time preparing to see the patient (eg, review of tests), Obtaining and/or   reviewing separately obtained history, Documenting clinical information in the electronic   or other health record, Independently interpreting results (not separately reported) and   communicating results to the patient/family/caregiver, or   Care coordination (not separately reported).     Each patient to whom he or she provides medical services by telemedicine is:    (1) informed of the relationship between the physician and patient and the respective   role of any other health care provider with respect to management of the patient; and   (2) notified that he or she may decline to receive medical services by telemedicine and   may withdraw from such care at any time.    I personally reviewed past medical, family and social history.

## 2022-06-13 ENCOUNTER — OFFICE VISIT (OUTPATIENT)
Dept: UROLOGY | Facility: CLINIC | Age: 82
End: 2022-06-13
Payer: MEDICARE

## 2022-06-13 VITALS — WEIGHT: 220 LBS | HEIGHT: 69 IN | BODY MASS INDEX: 32.58 KG/M2

## 2022-06-13 DIAGNOSIS — Z85.46 HISTORY OF PROSTATE CANCER: Primary | ICD-10-CM

## 2022-06-13 PROCEDURE — 99999 PR PBB SHADOW E&M-EST. PATIENT-LVL III: ICD-10-PCS | Mod: PBBFAC,,, | Performed by: UROLOGY

## 2022-06-13 PROCEDURE — 3288F PR FALLS RISK ASSESSMENT DOCUMENTED: ICD-10-PCS | Mod: CPTII,S$GLB,, | Performed by: UROLOGY

## 2022-06-13 PROCEDURE — 1126F PR PAIN SEVERITY QUANTIFIED, NO PAIN PRESENT: ICD-10-PCS | Mod: CPTII,S$GLB,, | Performed by: UROLOGY

## 2022-06-13 PROCEDURE — 1159F PR MEDICATION LIST DOCUMENTED IN MEDICAL RECORD: ICD-10-PCS | Mod: CPTII,S$GLB,, | Performed by: UROLOGY

## 2022-06-13 PROCEDURE — 1160F PR REVIEW ALL MEDS BY PRESCRIBER/CLIN PHARMACIST DOCUMENTED: ICD-10-PCS | Mod: CPTII,S$GLB,, | Performed by: UROLOGY

## 2022-06-13 PROCEDURE — 1126F AMNT PAIN NOTED NONE PRSNT: CPT | Mod: CPTII,S$GLB,, | Performed by: UROLOGY

## 2022-06-13 PROCEDURE — 1160F RVW MEDS BY RX/DR IN RCRD: CPT | Mod: CPTII,S$GLB,, | Performed by: UROLOGY

## 2022-06-13 PROCEDURE — 99213 PR OFFICE/OUTPT VISIT, EST, LEVL III, 20-29 MIN: ICD-10-PCS | Mod: S$GLB,,, | Performed by: UROLOGY

## 2022-06-13 PROCEDURE — 99999 PR PBB SHADOW E&M-EST. PATIENT-LVL III: CPT | Mod: PBBFAC,,, | Performed by: UROLOGY

## 2022-06-13 PROCEDURE — 1159F MED LIST DOCD IN RCRD: CPT | Mod: CPTII,S$GLB,, | Performed by: UROLOGY

## 2022-06-13 PROCEDURE — 1101F PR PT FALLS ASSESS DOC 0-1 FALLS W/OUT INJ PAST YR: ICD-10-PCS | Mod: CPTII,S$GLB,, | Performed by: UROLOGY

## 2022-06-13 PROCEDURE — 3288F FALL RISK ASSESSMENT DOCD: CPT | Mod: CPTII,S$GLB,, | Performed by: UROLOGY

## 2022-06-13 PROCEDURE — 1101F PT FALLS ASSESS-DOCD LE1/YR: CPT | Mod: CPTII,S$GLB,, | Performed by: UROLOGY

## 2022-06-13 PROCEDURE — 99213 OFFICE O/P EST LOW 20 MIN: CPT | Mod: S$GLB,,, | Performed by: UROLOGY

## 2022-06-13 NOTE — PROGRESS NOTES
Subjective:       Patient ID: Ramon Reilly is a 81 y.o. male.    Chief Complaint: Annual Exam    HPI     81-year-old with a history of prostate cancer.  Clinical stage T2bN0, PSA 11.2.  Morris's 3+3.  He completed a course of radiation therapy in October 2018.  He also completed 6 months of androgen deprivation therapy.  He is overall doing well.  He does have some obstructive urinary symptoms.  He is currently taking Flomax 0.8 mg daily.  His PSA is slightly increased to 0.23.  He still complains of urinary frequency and nocturia and he feels his stream is weaker.  We did discuss surgical treatment options such as Urolift.       Component PSA Diagnostic   Latest Ref Rng & Units 0.00 - 4.00 ng/mL   6/6/2020 0.23   10/29/2021 0.20   4/30/2021 0.16   10/27/2020 0.14   7/21/2020 0.11   3/10/2020 0.10   12/10/2019 0.14   9/4/2019 0.12   5/31/2019 0.10   3/4/2019 0.04   11/28/2018 0.09   10/19/2018 0.2       Review of Systems   Constitutional: Negative for fever.   Genitourinary: Negative for dysuria and hematuria.       Objective:      Physical Exam  Vitals reviewed.   Constitutional:       Appearance: He is well-developed.   Pulmonary:      Effort: Pulmonary effort is normal.   Skin:     Findings: No rash.   Neurological:      Mental Status: He is alert and oriented to person, place, and time.         Assessment:       1. History of prostate cancer        Plan:       History of prostate cancer  -     POCT URINE DIPSTICK WITHOUT MICROSCOPE  -     Prostate Specific Antigen, Diagnostic; Future; Expected date: 06/13/2023      annual follow-up with PSA

## 2022-07-08 ENCOUNTER — IMMUNIZATION (OUTPATIENT)
Dept: FAMILY MEDICINE | Facility: CLINIC | Age: 82
End: 2022-07-08
Payer: MEDICARE

## 2022-07-08 ENCOUNTER — OFFICE VISIT (OUTPATIENT)
Dept: FAMILY MEDICINE | Facility: CLINIC | Age: 82
End: 2022-07-08
Payer: MEDICARE

## 2022-07-08 ENCOUNTER — LAB VISIT (OUTPATIENT)
Dept: LAB | Facility: HOSPITAL | Age: 82
End: 2022-07-08
Attending: INTERNAL MEDICINE
Payer: MEDICARE

## 2022-07-08 VITALS
HEART RATE: 58 BPM | OXYGEN SATURATION: 98 % | HEIGHT: 69 IN | DIASTOLIC BLOOD PRESSURE: 64 MMHG | WEIGHT: 218.5 LBS | BODY MASS INDEX: 32.36 KG/M2 | SYSTOLIC BLOOD PRESSURE: 126 MMHG

## 2022-07-08 DIAGNOSIS — R53.83 FATIGUE, UNSPECIFIED TYPE: ICD-10-CM

## 2022-07-08 DIAGNOSIS — R73.03 PREDIABETES: ICD-10-CM

## 2022-07-08 DIAGNOSIS — K59.09 CHRONIC CONSTIPATION: ICD-10-CM

## 2022-07-08 DIAGNOSIS — R00.1 BRADYCARDIA: ICD-10-CM

## 2022-07-08 DIAGNOSIS — E78.49 OTHER HYPERLIPIDEMIA: ICD-10-CM

## 2022-07-08 DIAGNOSIS — E66.9 CLASS II OBESITY: ICD-10-CM

## 2022-07-08 DIAGNOSIS — Z00.00 ANNUAL PHYSICAL EXAM: Primary | ICD-10-CM

## 2022-07-08 DIAGNOSIS — Z23 NEED FOR VACCINATION: Primary | ICD-10-CM

## 2022-07-08 DIAGNOSIS — Z85.46 HISTORY OF PROSTATE CANCER: ICD-10-CM

## 2022-07-08 PROBLEM — C61 ADENOCARCINOMA OF PROSTATE: Status: RESOLVED | Noted: 2018-06-28 | Resolved: 2022-07-08

## 2022-07-08 PROBLEM — E78.5 HYPERLIPIDEMIA: Status: RESOLVED | Noted: 2018-02-16 | Resolved: 2022-07-08

## 2022-07-08 LAB — TSH SERPL DL<=0.005 MIU/L-ACNC: 0.96 UIU/ML (ref 0.4–4)

## 2022-07-08 PROCEDURE — 3074F PR MOST RECENT SYSTOLIC BLOOD PRESSURE < 130 MM HG: ICD-10-PCS | Mod: CPTII,S$GLB,, | Performed by: INTERNAL MEDICINE

## 2022-07-08 PROCEDURE — 99999 PR PBB SHADOW E&M-EST. PATIENT-LVL III: ICD-10-PCS | Mod: PBBFAC,,, | Performed by: INTERNAL MEDICINE

## 2022-07-08 PROCEDURE — 3288F FALL RISK ASSESSMENT DOCD: CPT | Mod: CPTII,S$GLB,, | Performed by: INTERNAL MEDICINE

## 2022-07-08 PROCEDURE — 3288F PR FALLS RISK ASSESSMENT DOCUMENTED: ICD-10-PCS | Mod: CPTII,S$GLB,, | Performed by: INTERNAL MEDICINE

## 2022-07-08 PROCEDURE — 99397 PR PREVENTIVE VISIT,EST,65 & OVER: ICD-10-PCS | Mod: S$GLB,,, | Performed by: INTERNAL MEDICINE

## 2022-07-08 PROCEDURE — 3078F PR MOST RECENT DIASTOLIC BLOOD PRESSURE < 80 MM HG: ICD-10-PCS | Mod: CPTII,S$GLB,, | Performed by: INTERNAL MEDICINE

## 2022-07-08 PROCEDURE — 1101F PR PT FALLS ASSESS DOC 0-1 FALLS W/OUT INJ PAST YR: ICD-10-PCS | Mod: CPTII,S$GLB,, | Performed by: INTERNAL MEDICINE

## 2022-07-08 PROCEDURE — 3078F DIAST BP <80 MM HG: CPT | Mod: CPTII,S$GLB,, | Performed by: INTERNAL MEDICINE

## 2022-07-08 PROCEDURE — 91305 COVID-19, MRNA, LNP-S, PF, 30 MCG/0.3 ML DOSE VACCINE (PFIZER): CPT | Mod: PBBFAC | Performed by: RADIOLOGY

## 2022-07-08 PROCEDURE — 1159F MED LIST DOCD IN RCRD: CPT | Mod: CPTII,S$GLB,, | Performed by: INTERNAL MEDICINE

## 2022-07-08 PROCEDURE — 1160F PR REVIEW ALL MEDS BY PRESCRIBER/CLIN PHARMACIST DOCUMENTED: ICD-10-PCS | Mod: CPTII,S$GLB,, | Performed by: INTERNAL MEDICINE

## 2022-07-08 PROCEDURE — 84443 ASSAY THYROID STIM HORMONE: CPT | Performed by: INTERNAL MEDICINE

## 2022-07-08 PROCEDURE — 36415 COLL VENOUS BLD VENIPUNCTURE: CPT | Mod: PO | Performed by: INTERNAL MEDICINE

## 2022-07-08 PROCEDURE — 99999 PR PBB SHADOW E&M-EST. PATIENT-LVL III: CPT | Mod: PBBFAC,,, | Performed by: INTERNAL MEDICINE

## 2022-07-08 PROCEDURE — 1101F PT FALLS ASSESS-DOCD LE1/YR: CPT | Mod: CPTII,S$GLB,, | Performed by: INTERNAL MEDICINE

## 2022-07-08 PROCEDURE — 1126F AMNT PAIN NOTED NONE PRSNT: CPT | Mod: CPTII,S$GLB,, | Performed by: INTERNAL MEDICINE

## 2022-07-08 PROCEDURE — 3074F SYST BP LT 130 MM HG: CPT | Mod: CPTII,S$GLB,, | Performed by: INTERNAL MEDICINE

## 2022-07-08 PROCEDURE — 1126F PR PAIN SEVERITY QUANTIFIED, NO PAIN PRESENT: ICD-10-PCS | Mod: CPTII,S$GLB,, | Performed by: INTERNAL MEDICINE

## 2022-07-08 PROCEDURE — 99397 PER PM REEVAL EST PAT 65+ YR: CPT | Mod: S$GLB,,, | Performed by: INTERNAL MEDICINE

## 2022-07-08 PROCEDURE — 1159F PR MEDICATION LIST DOCUMENTED IN MEDICAL RECORD: ICD-10-PCS | Mod: CPTII,S$GLB,, | Performed by: INTERNAL MEDICINE

## 2022-07-08 PROCEDURE — 1160F RVW MEDS BY RX/DR IN RCRD: CPT | Mod: CPTII,S$GLB,, | Performed by: INTERNAL MEDICINE

## 2022-07-08 NOTE — PROGRESS NOTES
Patient ID: Ramon Reilly is a 81 y.o. male.    Chief Complaint: Follow-up      Assessment and Plan      Slowly increase exercise which I think will help with weight loss subsequently improving quality of life.  Monitor left submandibular lymph node versus salivary gland, consider ultrasound.  Continue current medications, plan of care, and monitoring of chronic medical problems as discussed with patient. See med list and orders.    1. Annual physical exam     2. Other hyperlipidemia     3. History of prostate cancer     4. Prediabetes     5. Class II obesity     6. Chronic constipation     7. Bradycardia  TSH   8. Fatigue, unspecified type  TSH      HPI     Annual    Still having to strain, but less diarrhea/ leakage since taking linzess and metamucil. colonoscopy did show 2 polyps and possibly radiation induced injury. Still having to get up multiple times to urinate.  Possible left submandibular gland verses lymph node.  Will monitor this.    1. Other hyperlipidemia    2. History of prostate cancer   -follows with Dr. Su, PSA stable    3. Prediabetes  -a1c 5.2   4. Class II obesity   -stable, can't exercise as much.    5. Chronic constipation  -stable, continue linzess and metamucil       Health maintenance:  Shingles- will think about it   Fourth COVID vaccine- ?    Weight-stable, BMI 32  Labs- reviewed  Diet- does not eat much   Exercise- not much  Alcohol- none   Tobacco- none      Review of Systems   Gastrointestinal: Positive for constipation.   Endocrine: Positive for polyuria.        Vitals:    07/08/22 1310   BP: 126/64   Pulse: (!) 58     Physical Exam  Neck:     Cardiovascular:      Rate and Rhythm: Normal rate and regular rhythm.      Heart sounds: No murmur heard.    No gallop.   Pulmonary:      Breath sounds: Normal breath sounds. No wheezing or rhonchi.   Abdominal:      General: Bowel sounds are normal.      Palpations: Abdomen is soft.      Tenderness: There is no abdominal  tenderness.   Musculoskeletal:         General: Normal range of motion.      Cervical back: Neck supple.   Skin:     General: Skin is warm.      Findings: No rash.   Neurological:      Mental Status: He is alert.   Psychiatric:         Behavior: Behavior normal.         I personally reviewed past medical, family and social history.  Medication List with Changes/Refills   New Medications    VARICELLA-ZOSTER GE-AS01B, PF, (SHINGRIX, PF,) 50 MCG/0.5 ML INJECTION    Inject into the muscle.   Current Medications    ACETAMINOPHEN (TYLENOL) 500 MG TABLET    Take 500 mg by mouth every 6 (six) hours as needed for Pain.    CALCIUM CARBONATE (OS-IDA) 500 MG CALCIUM (1,250 MG) TABLET    Take 1 tablet (500 mg total) by mouth once daily.    CHOLECALCIFEROL, VITAMIN D3, (VITAMIN D3) 25 MCG (1,000 UNIT) CAPSULE    Take 1,000 Units by mouth once daily.    LINACLOTIDE (LINZESS) 72 MCG CAP CAPSULE    Take 1 capsule (72 mcg total) by mouth before breakfast.    MULTIVITAMIN CAPSULE    Take 1 capsule by mouth once daily.    TAMSULOSIN (FLOMAX) 0.4 MG CAP    Take 2 capsules (0.8 mg total) by mouth once daily. Take at bedtime

## 2022-09-16 DIAGNOSIS — K59.09 CHRONIC CONSTIPATION: ICD-10-CM

## 2022-09-16 NOTE — TELEPHONE ENCOUNTER
----- Message from Marj Bergman sent at 9/16/2022  8:45 AM CDT -----  Regarding: refill  Can the clinic reply in [facility]:       Please refill the medication(s) listed below. Please call the patient when the prescription(s) is ready for  at this phone number   TANNER CAMARGO [[id number]] [phone] (home)         Medication #1 linaCLOtide (LINZESS) 72 mcg Cap capsule          Preferred Pharmacy:   [id number] - [place] [address] [address] [employer] [address] [place] [id number]  Phone: [phone] Fax: [phone]

## 2022-09-16 NOTE — TELEPHONE ENCOUNTER
Refill Routing Note   Medication(s) are not appropriate for processing by [facility] for the following reason(s):      - Outside of protocol (non-delegated)    [facility] action(s):  Route          Medication reconciliation completed: No     Appointments  past 12m or future 3m with PCP    Date Provider   Last Visit   7/8/2022 Amadeo Delatorre, DO   Next Visit   Visit date not found Amadeo Delatorre,    ED visits in past 90 days: 0        Note composed:2:24 PM 09/16/2022

## 2022-10-31 ENCOUNTER — TELEPHONE (OUTPATIENT)
Dept: UROLOGY | Facility: CLINIC | Age: 82
End: 2022-10-31
Payer: MEDICARE

## 2022-10-31 NOTE — TELEPHONE ENCOUNTER
Pt stated he's out of his tamsulosin (FLOMAX) 0.4 mg Cap and would like a refill called into [facility] [address] [place] [id number] Phone: [phone] Fax: [phone].  Please advise.    Thank you

## 2022-10-31 NOTE — TELEPHONE ENCOUNTER
----- Message from Julia Lin sent at 10/31/2022  9:34 AM CDT -----  Type:  RX Refill Request    Who Called:  pt  Refill or New Rx:  refill  RX Name and Strength:  tamsulosin (FLOMAX) 0.4 mg Cap  How is the patient currently taking it? (ex. 1XDay):  as directed  Is this a 30 day or 90 day RX:  90  Preferred Pharmacy with phone number:   [facility] #[id number] - [place] [address] [address] [place] [address] [address]  [facility] [address] [place] [id number]  Phone: [phone] Fax: [phone]        Local or Mail Order:  local  Ordering Provider:  Matt Aviles Call Back Number:  [phone] (home)     Additional Information:  pt said he need this filled today he out for tonight--please call and advise--thank you

## 2022-11-01 RX ORDER — TAMSULOSIN HYDROCHLORIDE 0.4 MG/1
CAPSULE ORAL
Qty: 180 CAPSULE | Refills: 3 | Status: SHIPPED | OUTPATIENT
Start: 2022-11-01 | End: 2023-10-26

## 2023-04-20 ENCOUNTER — TELEPHONE (OUTPATIENT)
Dept: ORTHOPEDICS | Facility: CLINIC | Age: 83
End: 2023-04-20
Payer: MEDICARE

## 2023-04-20 ENCOUNTER — LAB VISIT (OUTPATIENT)
Dept: LAB | Facility: HOSPITAL | Age: 83
End: 2023-04-20
Attending: INTERNAL MEDICINE
Payer: MEDICARE

## 2023-04-20 ENCOUNTER — OFFICE VISIT (OUTPATIENT)
Dept: FAMILY MEDICINE | Facility: CLINIC | Age: 83
End: 2023-04-20
Payer: MEDICARE

## 2023-04-20 VITALS
HEART RATE: 73 BPM | OXYGEN SATURATION: 98 % | DIASTOLIC BLOOD PRESSURE: 72 MMHG | HEIGHT: 69 IN | WEIGHT: 209.44 LBS | SYSTOLIC BLOOD PRESSURE: 134 MMHG | BODY MASS INDEX: 31.02 KG/M2

## 2023-04-20 DIAGNOSIS — R73.03 PREDIABETES: ICD-10-CM

## 2023-04-20 DIAGNOSIS — R53.81 PHYSICAL DEBILITY: ICD-10-CM

## 2023-04-20 DIAGNOSIS — F41.9 ANXIETY: ICD-10-CM

## 2023-04-20 DIAGNOSIS — R53.83 FATIGUE, UNSPECIFIED TYPE: ICD-10-CM

## 2023-04-20 DIAGNOSIS — R26.89 BALANCE PROBLEM: ICD-10-CM

## 2023-04-20 DIAGNOSIS — E78.49 OTHER HYPERLIPIDEMIA: ICD-10-CM

## 2023-04-20 DIAGNOSIS — G89.29 CHRONIC LEFT SHOULDER PAIN: ICD-10-CM

## 2023-04-20 DIAGNOSIS — E78.49 OTHER HYPERLIPIDEMIA: Primary | ICD-10-CM

## 2023-04-20 DIAGNOSIS — M25.512 CHRONIC LEFT SHOULDER PAIN: ICD-10-CM

## 2023-04-20 LAB
ALBUMIN SERPL BCP-MCNC: 4 G/DL (ref 3.5–5.2)
ALP SERPL-CCNC: 71 U/L (ref 55–135)
ALT SERPL W/O P-5'-P-CCNC: 14 U/L (ref 10–44)
ANION GAP SERPL CALC-SCNC: 9 MMOL/L (ref 8–16)
AST SERPL-CCNC: 12 U/L (ref 10–40)
BASOPHILS # BLD AUTO: 0.04 K/UL (ref 0–0.2)
BASOPHILS NFR BLD: 0.5 % (ref 0–1.9)
BILIRUB SERPL-MCNC: 0.5 MG/DL (ref 0.1–1)
BILIRUB UR QL STRIP: NEGATIVE
BUN SERPL-MCNC: 17 MG/DL (ref 8–23)
CALCIUM SERPL-MCNC: 9.7 MG/DL (ref 8.7–10.5)
CHLORIDE SERPL-SCNC: 103 MMOL/L (ref 95–110)
CHOLEST SERPL-MCNC: 185 MG/DL (ref 120–199)
CHOLEST/HDLC SERPL: 3.6 {RATIO} (ref 2–5)
CLARITY UR: CLEAR
CO2 SERPL-SCNC: 26 MMOL/L (ref 23–29)
COLOR UR: YELLOW
CREAT SERPL-MCNC: 0.9 MG/DL (ref 0.5–1.4)
DIFFERENTIAL METHOD: ABNORMAL
EOSINOPHIL # BLD AUTO: 0 K/UL (ref 0–0.5)
EOSINOPHIL NFR BLD: 0.4 % (ref 0–8)
ERYTHROCYTE [DISTWIDTH] IN BLOOD BY AUTOMATED COUNT: 12.8 % (ref 11.5–14.5)
EST. GFR  (NO RACE VARIABLE): >60 ML/MIN/1.73 M^2
ESTIMATED AVG GLUCOSE: 108 MG/DL (ref 68–131)
GLUCOSE SERPL-MCNC: 115 MG/DL (ref 70–110)
GLUCOSE UR QL STRIP: NEGATIVE
HBA1C MFR BLD: 5.4 % (ref 4–5.6)
HCT VFR BLD AUTO: 42.6 % (ref 40–54)
HDLC SERPL-MCNC: 52 MG/DL (ref 40–75)
HDLC SERPL: 28.1 % (ref 20–50)
HGB BLD-MCNC: 14.1 G/DL (ref 14–18)
HGB UR QL STRIP: NEGATIVE
IMM GRANULOCYTES # BLD AUTO: 0.02 K/UL (ref 0–0.04)
IMM GRANULOCYTES NFR BLD AUTO: 0.3 % (ref 0–0.5)
KETONES UR QL STRIP: NEGATIVE
LDLC SERPL CALC-MCNC: 115.8 MG/DL (ref 63–159)
LEUKOCYTE ESTERASE UR QL STRIP: NEGATIVE
LYMPHOCYTES # BLD AUTO: 1 K/UL (ref 1–4.8)
LYMPHOCYTES NFR BLD: 13 % (ref 18–48)
MCH RBC QN AUTO: 30.5 PG (ref 27–31)
MCHC RBC AUTO-ENTMCNC: 33.1 G/DL (ref 32–36)
MCV RBC AUTO: 92 FL (ref 82–98)
MONOCYTES # BLD AUTO: 0.7 K/UL (ref 0.3–1)
MONOCYTES NFR BLD: 9.8 % (ref 4–15)
NEUTROPHILS # BLD AUTO: 5.7 K/UL (ref 1.8–7.7)
NEUTROPHILS NFR BLD: 76 % (ref 38–73)
NITRITE UR QL STRIP: NEGATIVE
NONHDLC SERPL-MCNC: 133 MG/DL
NRBC BLD-RTO: 0 /100 WBC
PH UR STRIP: 6 [PH] (ref 5–8)
PLATELET # BLD AUTO: 234 K/UL (ref 150–450)
PMV BLD AUTO: 10.4 FL (ref 9.2–12.9)
POTASSIUM SERPL-SCNC: 4 MMOL/L (ref 3.5–5.1)
PROT SERPL-MCNC: 7.1 G/DL (ref 6–8.4)
PROT UR QL STRIP: NEGATIVE
RBC # BLD AUTO: 4.63 M/UL (ref 4.6–6.2)
SODIUM SERPL-SCNC: 138 MMOL/L (ref 136–145)
SP GR UR STRIP: 1.01 (ref 1–1.03)
TRIGL SERPL-MCNC: 86 MG/DL (ref 30–150)
TSH SERPL DL<=0.005 MIU/L-ACNC: 1.33 UIU/ML (ref 0.4–4)
URN SPEC COLLECT METH UR: NORMAL
WBC # BLD AUTO: 7.47 K/UL (ref 3.9–12.7)

## 2023-04-20 PROCEDURE — 3075F SYST BP GE 130 - 139MM HG: CPT | Mod: CPTII,S$GLB,, | Performed by: INTERNAL MEDICINE

## 2023-04-20 PROCEDURE — 1101F PR PT FALLS ASSESS DOC 0-1 FALLS W/OUT INJ PAST YR: ICD-10-PCS | Mod: CPTII,S$GLB,, | Performed by: INTERNAL MEDICINE

## 2023-04-20 PROCEDURE — 99999 PR PBB SHADOW E&M-EST. PATIENT-LVL V: CPT | Mod: PBBFAC,,, | Performed by: INTERNAL MEDICINE

## 2023-04-20 PROCEDURE — 3078F DIAST BP <80 MM HG: CPT | Mod: CPTII,S$GLB,, | Performed by: INTERNAL MEDICINE

## 2023-04-20 PROCEDURE — 1160F RVW MEDS BY RX/DR IN RCRD: CPT | Mod: CPTII,S$GLB,, | Performed by: INTERNAL MEDICINE

## 2023-04-20 PROCEDURE — 3075F PR MOST RECENT SYSTOLIC BLOOD PRESS GE 130-139MM HG: ICD-10-PCS | Mod: CPTII,S$GLB,, | Performed by: INTERNAL MEDICINE

## 2023-04-20 PROCEDURE — 99214 PR OFFICE/OUTPT VISIT, EST, LEVL IV, 30-39 MIN: ICD-10-PCS | Mod: S$GLB,,, | Performed by: INTERNAL MEDICINE

## 2023-04-20 PROCEDURE — 1125F PR PAIN SEVERITY QUANTIFIED, PAIN PRESENT: ICD-10-PCS | Mod: CPTII,S$GLB,, | Performed by: INTERNAL MEDICINE

## 2023-04-20 PROCEDURE — 85025 COMPLETE CBC W/AUTO DIFF WBC: CPT | Performed by: INTERNAL MEDICINE

## 2023-04-20 PROCEDURE — 1101F PT FALLS ASSESS-DOCD LE1/YR: CPT | Mod: CPTII,S$GLB,, | Performed by: INTERNAL MEDICINE

## 2023-04-20 PROCEDURE — 1159F PR MEDICATION LIST DOCUMENTED IN MEDICAL RECORD: ICD-10-PCS | Mod: CPTII,S$GLB,, | Performed by: INTERNAL MEDICINE

## 2023-04-20 PROCEDURE — 80061 LIPID PANEL: CPT | Performed by: INTERNAL MEDICINE

## 2023-04-20 PROCEDURE — 99214 OFFICE O/P EST MOD 30 MIN: CPT | Mod: S$GLB,,, | Performed by: INTERNAL MEDICINE

## 2023-04-20 PROCEDURE — 3288F PR FALLS RISK ASSESSMENT DOCUMENTED: ICD-10-PCS | Mod: CPTII,S$GLB,, | Performed by: INTERNAL MEDICINE

## 2023-04-20 PROCEDURE — 1160F PR REVIEW ALL MEDS BY PRESCRIBER/CLIN PHARMACIST DOCUMENTED: ICD-10-PCS | Mod: CPTII,S$GLB,, | Performed by: INTERNAL MEDICINE

## 2023-04-20 PROCEDURE — 3078F PR MOST RECENT DIASTOLIC BLOOD PRESSURE < 80 MM HG: ICD-10-PCS | Mod: CPTII,S$GLB,, | Performed by: INTERNAL MEDICINE

## 2023-04-20 PROCEDURE — 99999 PR PBB SHADOW E&M-EST. PATIENT-LVL V: ICD-10-PCS | Mod: PBBFAC,,, | Performed by: INTERNAL MEDICINE

## 2023-04-20 PROCEDURE — 3288F FALL RISK ASSESSMENT DOCD: CPT | Mod: CPTII,S$GLB,, | Performed by: INTERNAL MEDICINE

## 2023-04-20 PROCEDURE — 80053 COMPREHEN METABOLIC PANEL: CPT | Performed by: INTERNAL MEDICINE

## 2023-04-20 PROCEDURE — 84443 ASSAY THYROID STIM HORMONE: CPT | Performed by: INTERNAL MEDICINE

## 2023-04-20 PROCEDURE — 1125F AMNT PAIN NOTED PAIN PRSNT: CPT | Mod: CPTII,S$GLB,, | Performed by: INTERNAL MEDICINE

## 2023-04-20 PROCEDURE — 83036 HEMOGLOBIN GLYCOSYLATED A1C: CPT | Performed by: INTERNAL MEDICINE

## 2023-04-20 PROCEDURE — 81003 URINALYSIS AUTO W/O SCOPE: CPT | Mod: PO | Performed by: INTERNAL MEDICINE

## 2023-04-20 PROCEDURE — 36415 COLL VENOUS BLD VENIPUNCTURE: CPT | Mod: PO | Performed by: INTERNAL MEDICINE

## 2023-04-20 PROCEDURE — 1159F MED LIST DOCD IN RCRD: CPT | Mod: CPTII,S$GLB,, | Performed by: INTERNAL MEDICINE

## 2023-04-20 RX ORDER — SERTRALINE HYDROCHLORIDE 25 MG/1
25 TABLET, FILM COATED ORAL DAILY
Qty: 30 TABLET | Refills: 2 | Status: SHIPPED | OUTPATIENT
Start: 2023-04-20 | End: 2023-07-14 | Stop reason: SDUPTHER

## 2023-04-20 NOTE — PROGRESS NOTES
Patient ID: Ramon Reilly is a 82 y.o. male.    Chief Complaint: Pre-op Exam and Anxiety        HPI - Assessment - Plan      He is here for preop Left eye cataract. But he is having multiple issues.     Since the last time I saw him his physical and mental health has deteriorated. Has very little energy and lack of leg strength. He is having trouble turning around while ambulating. He is having left shoulder pain. It is hard for him to lift above 45 degrees.  He is experiencing depression and anxiety.He is needing help with ADLs and needs physical therapy. Home-based PT and nursing would be very beneficial for him.    PLAN:  - Left arm Tremor, difficulty turning when ambulating, consider Parkinson's   - Start sertraline  - home health PT And nursing.  - See about hospital bed  - shower chair  - Referral to orthopedics for left shoulder  - check labs  - Follow-up in 2 weeks    1. Other hyperlipidemia    2. Fatigue, unspecified type    3. Prediabetes    4. Anxiety    5. Balance problem    6. Physical debility    7. Chronic left shoulder pain          Review of Systems   Constitutional:  Positive for activity change. Negative for unexpected weight change.   HENT:  Positive for hearing loss. Negative for rhinorrhea and trouble swallowing.    Eyes:  Positive for visual disturbance. Negative for discharge.   Respiratory:  Negative for chest tightness and wheezing.    Cardiovascular:  Negative for chest pain and palpitations.   Gastrointestinal:  Positive for constipation. Negative for blood in stool, diarrhea and vomiting.   Endocrine: Positive for polyuria. Negative for polydipsia.   Genitourinary:  Positive for difficulty urinating. Negative for hematuria and urgency.   Musculoskeletal:  Positive for arthralgias. Negative for joint swelling and neck pain.   Neurological:  Positive for weakness. Negative for headaches.   Psychiatric/Behavioral:  Negative for confusion and dysphoric mood.       Objective      Vitals:    04/20/23 1502   BP: 134/72   Pulse: 73     Wt Readings from Last 3 Encounters:   04/20/23 1502 95 kg (209 lb 7 oz)   07/08/22 1310 99.1 kg (218 lb 7.6 oz)   06/13/22 1409 99.8 kg (220 lb 0.3 oz)      Body mass index is 31.38 kg/m².   Physical Exam  Cardiovascular:      Rate and Rhythm: Normal rate and regular rhythm.      Heart sounds: No murmur heard.    No gallop.   Pulmonary:      Breath sounds: Normal breath sounds. No wheezing or rhonchi.   Abdominal:      Palpations: Abdomen is soft.      Tenderness: There is no abdominal tenderness.   Musculoskeletal:         General: Normal range of motion.      Cervical back: Neck supple.   Skin:     General: Skin is warm.      Findings: No rash.   Neurological:      Mental Status: He is alert.      Motor: Weakness present.      Comments: Muscle strength 4 / 5 bilateral lower extremities.  Left arm tremor, abnormal rapid finger tap   Psychiatric:         Mood and Affect: Mood is depressed.         Behavior: Behavior normal.        Chivo Navarro was seen today for pre-op exam and anxiety.    Diagnoses and all orders for this visit:    Other hyperlipidemia  -     Lipid Panel; Future    Fatigue, unspecified type  -     Comprehensive Metabolic Panel; Future  -     CBC Auto Differential; Future  -     TSH; Future  -     Cancel: Ambulatory referral/consult to Home Health; Future  -     Urinalysis, Reflex to Urine Culture Urine, Clean Catch; Future  -     BATH/SHOWER CHAIR FOR HOME USE  -     Ambulatory referral/consult to Home Health; Future    Prediabetes  -     Hemoglobin A1C; Future    Anxiety  -     sertraline (ZOLOFT) 25 MG tablet; Take 1 tablet (25 mg total) by mouth once daily.    Balance problem  -     Cancel: Ambulatory referral/consult to Home Health; Future  -     BATH/SHOWER CHAIR FOR HOME USE  -     Ambulatory referral/consult to Home Health; Future    Physical debility  -     Cancel: Ambulatory referral/consult to Home Health; Future  -      BATH/SHOWER CHAIR FOR HOME USE  -     Ambulatory referral/consult to Home Health; Future    Chronic left shoulder pain  -     Ambulatory referral/consult to Orthopedics; Future                  Medication List with Changes/Refills   New Medications    SERTRALINE (ZOLOFT) 25 MG TABLET    Take 1 tablet (25 mg total) by mouth once daily.   Current Medications    ACETAMINOPHEN (TYLENOL) 500 MG TABLET    Take 500 mg by mouth every 6 (six) hours as needed for Pain.    CALCIUM CARBONATE (OS-IDA) 500 MG CALCIUM (1,250 MG) TABLET    Take 1 tablet (500 mg total) by mouth once daily.    CHOLECALCIFEROL, VITAMIN D3, (VITAMIN D3) 25 MCG (1,000 UNIT) CAPSULE    Take 1,000 Units by mouth once daily.    LINACLOTIDE (LINZESS) 72 MCG CAP CAPSULE    Take 1 capsule (72 mcg total) by mouth before breakfast.    MULTIVITAMIN CAPSULE    Take 1 capsule by mouth once daily.    TAMSULOSIN (FLOMAX) 0.4 MG CAP    TAKE 2 CAPSULES(0.8 MG) BY MOUTH EVERY DAY AT BEDTIME  Strength: 0.4 mg    VARICELLA-ZOSTER GE-AS01B, PF, (SHINGRIX, PF,) 50 MCG/0.5 ML INJECTION    Inject into the muscle.       I personally reviewed past medical, family and social history.    Patient Active Problem List   Diagnosis    Prediabetes    Other hyperlipidemia    Knee pain, bilateral    Cholecystitis    Colon polyps    Diverticulosis of large intestine without hemorrhage    Benign prostatic hyperplasia    Nephrolithiasis    Microhematuria    Class II obesity    Chronic constipation    Lumbar radiculopathy    History of prostate cancer

## 2023-04-21 ENCOUNTER — TELEPHONE (OUTPATIENT)
Dept: ORTHOPEDICS | Facility: CLINIC | Age: 83
End: 2023-04-21
Payer: MEDICARE

## 2023-04-21 PROCEDURE — G0180 MD CERTIFICATION HHA PATIENT: HCPCS | Mod: ,,, | Performed by: INTERNAL MEDICINE

## 2023-04-21 PROCEDURE — G0180 PR HOME HEALTH MD CERTIFICATION: ICD-10-PCS | Mod: ,,, | Performed by: INTERNAL MEDICINE

## 2023-05-03 ENCOUNTER — EXTERNAL HOME HEALTH (OUTPATIENT)
Dept: HOME HEALTH SERVICES | Facility: HOSPITAL | Age: 83
End: 2023-05-03
Payer: MEDICARE

## 2023-06-01 ENCOUNTER — TELEPHONE (OUTPATIENT)
Dept: FAMILY MEDICINE | Facility: CLINIC | Age: 83
End: 2023-06-01
Payer: MEDICARE

## 2023-06-01 NOTE — TELEPHONE ENCOUNTER
----- Message from Tammy Orta sent at 6/1/2023  2:00 PM CDT -----  Type: Needs Medical Advice  Who Called:  [facility]     Best Call Back Number: [phone]  Additional Information: pt has funny heart beat requesting a call back , pt needs to see cardio. Pt had out patient sx today.. funny ekg  please advise thank you

## 2023-06-01 NOTE — TELEPHONE ENCOUNTER
Called and spoke with nurse at the surgery center. Informed her if the patient have abnormal ekg and heart beat it needs to be addressed now.  To ask the surgeon to address the  patients health and if necessary to send to the emergency department.  Or they need to consult cardiologist.  Left a voice message at the nurses station the second time I called with our fax number

## 2023-06-02 DIAGNOSIS — K59.09 CHRONIC CONSTIPATION: ICD-10-CM

## 2023-06-02 RX ORDER — LINACLOTIDE 72 UG/1
CAPSULE, GELATIN COATED ORAL
Qty: 90 CAPSULE | Refills: 1 | Status: SHIPPED | OUTPATIENT
Start: 2023-06-02

## 2023-06-20 ENCOUNTER — DOCUMENT SCAN (OUTPATIENT)
Dept: HOME HEALTH SERVICES | Facility: HOSPITAL | Age: 83
End: 2023-06-20
Payer: MEDICARE

## 2023-07-14 ENCOUNTER — OFFICE VISIT (OUTPATIENT)
Dept: FAMILY MEDICINE | Facility: CLINIC | Age: 83
End: 2023-07-14
Payer: MEDICARE

## 2023-07-14 ENCOUNTER — LAB VISIT (OUTPATIENT)
Dept: LAB | Facility: HOSPITAL | Age: 83
End: 2023-07-14
Attending: INTERNAL MEDICINE
Payer: MEDICARE

## 2023-07-14 VITALS
DIASTOLIC BLOOD PRESSURE: 70 MMHG | SYSTOLIC BLOOD PRESSURE: 130 MMHG | HEART RATE: 71 BPM | HEIGHT: 70 IN | BODY MASS INDEX: 32.57 KG/M2 | OXYGEN SATURATION: 99 % | WEIGHT: 227.5 LBS

## 2023-07-14 DIAGNOSIS — F41.9 ANXIETY: ICD-10-CM

## 2023-07-14 DIAGNOSIS — I89.0 LYMPHEDEMA: Primary | ICD-10-CM

## 2023-07-14 DIAGNOSIS — Z85.46 HISTORY OF PROSTATE CANCER: ICD-10-CM

## 2023-07-14 DIAGNOSIS — G20.A1 PARKINSON DISEASE: ICD-10-CM

## 2023-07-14 LAB — COMPLEXED PSA SERPL-MCNC: 0.16 NG/ML (ref 0–4)

## 2023-07-14 PROCEDURE — 3075F PR MOST RECENT SYSTOLIC BLOOD PRESS GE 130-139MM HG: ICD-10-PCS | Mod: CPTII,S$GLB,, | Performed by: INTERNAL MEDICINE

## 2023-07-14 PROCEDURE — 3078F DIAST BP <80 MM HG: CPT | Mod: CPTII,S$GLB,, | Performed by: INTERNAL MEDICINE

## 2023-07-14 PROCEDURE — 99214 OFFICE O/P EST MOD 30 MIN: CPT | Mod: S$GLB,,, | Performed by: INTERNAL MEDICINE

## 2023-07-14 PROCEDURE — 36415 COLL VENOUS BLD VENIPUNCTURE: CPT | Mod: PO | Performed by: INTERNAL MEDICINE

## 2023-07-14 PROCEDURE — 1160F RVW MEDS BY RX/DR IN RCRD: CPT | Mod: CPTII,S$GLB,, | Performed by: INTERNAL MEDICINE

## 2023-07-14 PROCEDURE — 99999 PR PBB SHADOW E&M-EST. PATIENT-LVL IV: CPT | Mod: PBBFAC,,, | Performed by: INTERNAL MEDICINE

## 2023-07-14 PROCEDURE — 84153 ASSAY OF PSA TOTAL: CPT | Performed by: INTERNAL MEDICINE

## 2023-07-14 PROCEDURE — 3078F PR MOST RECENT DIASTOLIC BLOOD PRESSURE < 80 MM HG: ICD-10-PCS | Mod: CPTII,S$GLB,, | Performed by: INTERNAL MEDICINE

## 2023-07-14 PROCEDURE — 1159F PR MEDICATION LIST DOCUMENTED IN MEDICAL RECORD: ICD-10-PCS | Mod: CPTII,S$GLB,, | Performed by: INTERNAL MEDICINE

## 2023-07-14 PROCEDURE — 99999 PR PBB SHADOW E&M-EST. PATIENT-LVL IV: ICD-10-PCS | Mod: PBBFAC,,, | Performed by: INTERNAL MEDICINE

## 2023-07-14 PROCEDURE — 1159F MED LIST DOCD IN RCRD: CPT | Mod: CPTII,S$GLB,, | Performed by: INTERNAL MEDICINE

## 2023-07-14 PROCEDURE — 99214 PR OFFICE/OUTPT VISIT, EST, LEVL IV, 30-39 MIN: ICD-10-PCS | Mod: S$GLB,,, | Performed by: INTERNAL MEDICINE

## 2023-07-14 PROCEDURE — 1125F PR PAIN SEVERITY QUANTIFIED, PAIN PRESENT: ICD-10-PCS | Mod: CPTII,S$GLB,, | Performed by: INTERNAL MEDICINE

## 2023-07-14 PROCEDURE — 1160F PR REVIEW ALL MEDS BY PRESCRIBER/CLIN PHARMACIST DOCUMENTED: ICD-10-PCS | Mod: CPTII,S$GLB,, | Performed by: INTERNAL MEDICINE

## 2023-07-14 PROCEDURE — 3288F PR FALLS RISK ASSESSMENT DOCUMENTED: ICD-10-PCS | Mod: CPTII,S$GLB,, | Performed by: INTERNAL MEDICINE

## 2023-07-14 PROCEDURE — 1101F PR PT FALLS ASSESS DOC 0-1 FALLS W/OUT INJ PAST YR: ICD-10-PCS | Mod: CPTII,S$GLB,, | Performed by: INTERNAL MEDICINE

## 2023-07-14 PROCEDURE — 3075F SYST BP GE 130 - 139MM HG: CPT | Mod: CPTII,S$GLB,, | Performed by: INTERNAL MEDICINE

## 2023-07-14 PROCEDURE — 1125F AMNT PAIN NOTED PAIN PRSNT: CPT | Mod: CPTII,S$GLB,, | Performed by: INTERNAL MEDICINE

## 2023-07-14 PROCEDURE — 1101F PT FALLS ASSESS-DOCD LE1/YR: CPT | Mod: CPTII,S$GLB,, | Performed by: INTERNAL MEDICINE

## 2023-07-14 PROCEDURE — 3288F FALL RISK ASSESSMENT DOCD: CPT | Mod: CPTII,S$GLB,, | Performed by: INTERNAL MEDICINE

## 2023-07-14 RX ORDER — SERTRALINE HYDROCHLORIDE 50 MG/1
50 TABLET, FILM COATED ORAL DAILY
Qty: 90 TABLET | Refills: 1 | Status: SHIPPED | OUTPATIENT
Start: 2023-07-14 | End: 2024-01-09

## 2023-07-14 NOTE — PROGRESS NOTES
Patient ID: Ramon Reilly is a 82 y.o. male.    Chief Complaint: ER follow up (1st degree AV block), Fatigue, and Leg Swelling (Foot swelling)        Assessment and Plan      1. Lymphedema    2. Anxiety    3. Parkinson disease    4. History of prostate cancer       Increase Zoloft 50 mg   Try compression socks with zipper  See about compression pumps  To Neurology for what is likely Parkinson's disease.  He may benefit from carbidopa levodopa  Check PSA  Follow-up in 1 month     HPI     Here with Daughter in law, Lindsay.   Went to ER for abnormal EKG and elevated blood pressure while at cataract surgery preop.  EKG there showed first-degree AV block with PVCs and left anterior fascicular block.  Labs were acceptable at ER.     Mood better on zoloft, but not a therapeutic dose.   He finished home health,   Bathroom renovated.   Left hand tremor, feels like feet are locked to the floor, masked facies.   Bilateral lower leg swelling, normal BNP    Review of Systems   Constitutional:  Negative for fever.   Respiratory:  Negative for shortness of breath.    Cardiovascular:  Positive for leg swelling. Negative for chest pain.   Gastrointestinal:  Negative for abdominal pain.      Objective     Vitals:    23 0947   BP: 130/70   Pulse: 71     Wt Readings from Last 3 Encounters:   23 0947 103.2 kg (227 lb 8.2 oz)   23 1613 98.9 kg (218 lb)   23 1502 95 kg (209 lb 7 oz)      Body mass index is 32.65 kg/m².   Physical Exam  Cardiovascular:      Rate and Rhythm: Normal rate and regular rhythm.      Heart sounds: No murmur heard.    No gallop.   Pulmonary:      Breath sounds: Normal breath sounds. No wheezing or rhonchi.   Abdominal:      Palpations: Abdomen is soft.      Tenderness: There is no abdominal tenderness.   Musculoskeletal:      Right lower le+ Edema present.      Left lower le+ Edema present.   Neurological:      Comments: Left hand tremor, rapid movements slow decrementally on  the left side (hand pat and finger tap), expressionless face        Orders     Ramon was seen today for er follow up, fatigue and leg swelling.    Diagnoses and all orders for this visit:    Lymphedema  -     PNEUMATIC PUMP FOR HOME USE    Anxiety  -     sertraline (ZOLOFT) 50 MG tablet; Take 1 tablet (50 mg total) by mouth once daily.    Parkinson disease  -     Ambulatory referral/consult to Neurology; Future    History of prostate cancer  -     PROSTATE SPECIFIC ANTIGEN, DIAGNOSTIC; Future         Medication List with Changes/Refills   Current Medications    ACETAMINOPHEN (TYLENOL) 500 MG TABLET    Take 500 mg by mouth every 6 (six) hours as needed for Pain.    CALCIUM CARBONATE (OS-IDA) 500 MG CALCIUM (1,250 MG) TABLET    Take 1 tablet (500 mg total) by mouth once daily.    CHOLECALCIFEROL, VITAMIN D3, (VITAMIN D3) 25 MCG (1,000 UNIT) CAPSULE    Take 1,000 Units by mouth once daily.    LINZESS 72 MCG CAP CAPSULE    TAKE 1 CAPSULE(72 MCG) BY MOUTH BEFORE BREAKFAST    MULTIVITAMIN CAPSULE    Take 1 capsule by mouth once daily.    NAPROXEN SODIUM (ALEVE ORAL)    Take by mouth.    TAMSULOSIN (FLOMAX) 0.4 MG CAP    TAKE 2 CAPSULES(0.8 MG) BY MOUTH EVERY DAY AT BEDTIME  Strength: 0.4 mg    VARICELLA-ZOSTER GE-AS01B, PF, (SHINGRIX, PF,) 50 MCG/0.5 ML INJECTION    Inject into the muscle.   Changed and/or Refilled Medications    Modified Medication Previous Medication    SERTRALINE (ZOLOFT) 50 MG TABLET sertraline (ZOLOFT) 25 MG tablet       Take 1 tablet (50 mg total) by mouth once daily.    Take 1 tablet (25 mg total) by mouth once daily.       I personally reviewed past medical, family and social history.    Patient Active Problem List   Diagnosis    Prediabetes    Other hyperlipidemia    Knee pain, bilateral    Cholecystitis    Colon polyps    Diverticulosis of large intestine without hemorrhage    Benign prostatic hyperplasia    Nephrolithiasis    Microhematuria    Class II obesity    Chronic constipation    Lumbar  radiculopathy    History of prostate cancer

## 2023-09-06 ENCOUNTER — TELEPHONE (OUTPATIENT)
Dept: PODIATRY | Facility: CLINIC | Age: 83
End: 2023-09-06
Payer: MEDICARE

## 2023-09-13 ENCOUNTER — OFFICE VISIT (OUTPATIENT)
Dept: PODIATRY | Facility: CLINIC | Age: 83
End: 2023-09-13
Payer: MEDICARE

## 2023-09-13 VITALS — BODY MASS INDEX: 32.57 KG/M2 | WEIGHT: 227.5 LBS | HEIGHT: 70 IN

## 2023-09-13 DIAGNOSIS — R60.0 BILATERAL LEG EDEMA: Primary | ICD-10-CM

## 2023-09-13 DIAGNOSIS — L60.3 ONYCHODYSTROPHY: ICD-10-CM

## 2023-09-13 DIAGNOSIS — L84 CORN OR CALLUS: ICD-10-CM

## 2023-09-13 DIAGNOSIS — I73.9 PVD (PERIPHERAL VASCULAR DISEASE): ICD-10-CM

## 2023-09-13 DIAGNOSIS — B35.3 TINEA PEDIS OF BOTH FEET: ICD-10-CM

## 2023-09-13 PROCEDURE — 1101F PR PT FALLS ASSESS DOC 0-1 FALLS W/OUT INJ PAST YR: ICD-10-PCS | Mod: CPTII,S$GLB,, | Performed by: STUDENT IN AN ORGANIZED HEALTH CARE EDUCATION/TRAINING PROGRAM

## 2023-09-13 PROCEDURE — 1160F PR REVIEW ALL MEDS BY PRESCRIBER/CLIN PHARMACIST DOCUMENTED: ICD-10-PCS | Mod: CPTII,S$GLB,, | Performed by: STUDENT IN AN ORGANIZED HEALTH CARE EDUCATION/TRAINING PROGRAM

## 2023-09-13 PROCEDURE — 99999 PR PBB SHADOW E&M-EST. PATIENT-LVL III: CPT | Mod: PBBFAC,,, | Performed by: STUDENT IN AN ORGANIZED HEALTH CARE EDUCATION/TRAINING PROGRAM

## 2023-09-13 PROCEDURE — 3288F FALL RISK ASSESSMENT DOCD: CPT | Mod: CPTII,S$GLB,, | Performed by: STUDENT IN AN ORGANIZED HEALTH CARE EDUCATION/TRAINING PROGRAM

## 2023-09-13 PROCEDURE — 1126F AMNT PAIN NOTED NONE PRSNT: CPT | Mod: CPTII,S$GLB,, | Performed by: STUDENT IN AN ORGANIZED HEALTH CARE EDUCATION/TRAINING PROGRAM

## 2023-09-13 PROCEDURE — 1160F RVW MEDS BY RX/DR IN RCRD: CPT | Mod: CPTII,S$GLB,, | Performed by: STUDENT IN AN ORGANIZED HEALTH CARE EDUCATION/TRAINING PROGRAM

## 2023-09-13 PROCEDURE — 11056 ROUTINE FOOT CARE: ICD-10-PCS | Mod: S$GLB,,, | Performed by: STUDENT IN AN ORGANIZED HEALTH CARE EDUCATION/TRAINING PROGRAM

## 2023-09-13 PROCEDURE — 1159F PR MEDICATION LIST DOCUMENTED IN MEDICAL RECORD: ICD-10-PCS | Mod: CPTII,S$GLB,, | Performed by: STUDENT IN AN ORGANIZED HEALTH CARE EDUCATION/TRAINING PROGRAM

## 2023-09-13 PROCEDURE — 1159F MED LIST DOCD IN RCRD: CPT | Mod: CPTII,S$GLB,, | Performed by: STUDENT IN AN ORGANIZED HEALTH CARE EDUCATION/TRAINING PROGRAM

## 2023-09-13 PROCEDURE — 11721 ROUTINE FOOT CARE: ICD-10-PCS | Mod: 59,S$GLB,, | Performed by: STUDENT IN AN ORGANIZED HEALTH CARE EDUCATION/TRAINING PROGRAM

## 2023-09-13 PROCEDURE — 1126F PR PAIN SEVERITY QUANTIFIED, NO PAIN PRESENT: ICD-10-PCS | Mod: CPTII,S$GLB,, | Performed by: STUDENT IN AN ORGANIZED HEALTH CARE EDUCATION/TRAINING PROGRAM

## 2023-09-13 PROCEDURE — 99203 PR OFFICE/OUTPT VISIT, NEW, LEVL III, 30-44 MIN: ICD-10-PCS | Mod: 25,S$GLB,, | Performed by: STUDENT IN AN ORGANIZED HEALTH CARE EDUCATION/TRAINING PROGRAM

## 2023-09-13 PROCEDURE — 99203 OFFICE O/P NEW LOW 30 MIN: CPT | Mod: 25,S$GLB,, | Performed by: STUDENT IN AN ORGANIZED HEALTH CARE EDUCATION/TRAINING PROGRAM

## 2023-09-13 PROCEDURE — 11721 DEBRIDE NAIL 6 OR MORE: CPT | Mod: 59,S$GLB,, | Performed by: STUDENT IN AN ORGANIZED HEALTH CARE EDUCATION/TRAINING PROGRAM

## 2023-09-13 PROCEDURE — 99999 PR PBB SHADOW E&M-EST. PATIENT-LVL III: ICD-10-PCS | Mod: PBBFAC,,, | Performed by: STUDENT IN AN ORGANIZED HEALTH CARE EDUCATION/TRAINING PROGRAM

## 2023-09-13 PROCEDURE — 1101F PT FALLS ASSESS-DOCD LE1/YR: CPT | Mod: CPTII,S$GLB,, | Performed by: STUDENT IN AN ORGANIZED HEALTH CARE EDUCATION/TRAINING PROGRAM

## 2023-09-13 PROCEDURE — 11056 PARNG/CUTG B9 HYPRKR LES 2-4: CPT | Mod: S$GLB,,, | Performed by: STUDENT IN AN ORGANIZED HEALTH CARE EDUCATION/TRAINING PROGRAM

## 2023-09-13 PROCEDURE — 3288F PR FALLS RISK ASSESSMENT DOCUMENTED: ICD-10-PCS | Mod: CPTII,S$GLB,, | Performed by: STUDENT IN AN ORGANIZED HEALTH CARE EDUCATION/TRAINING PROGRAM

## 2023-09-13 RX ORDER — KETOCONAZOLE 20 MG/G
CREAM TOPICAL DAILY
Qty: 60 G | Refills: 6 | Status: SHIPPED | OUTPATIENT
Start: 2023-09-13 | End: 2023-12-12

## 2023-09-13 NOTE — PROGRESS NOTES
Subjective:      Patient ID: Ramon Reilly is a 82 y.o. male.    Chief Complaint: Foot Swelling    Ramon Reilly is a 82 y.o. male who  has a past medical history of Arthritis, BPH (benign prostatic hyperplasia), Cancer, Chronic constipation, History of kidney stones, Knee pain, Neurogenic claudication, Rectal bleeding, Sleep apnea, Squamous cell carcinoma of skin, Urinary frequency, Uses roller walker, Weakness of both legs, and Wears glasses.    Ms. Reilly presents today with complaints of b/l leg edema, elongated toe nails that are painful and scaling skin on the bottom of both feet.     Review of Systems   Cardiovascular:  Positive for leg swelling.   Skin:  Positive for nail changes and unusual hair distribution.   Neurological:  Positive for numbness, paresthesias, sensory change and tremors.           Objective:      Physical Exam  Cardiovascular:      Pulses:           Dorsalis pedis pulses are 0 on the right side and 0 on the left side.        Posterior tibial pulses are 0 on the right side and 0 on the left side.   Musculoskeletal:      Right lower leg: 3+ Pitting Edema present.      Left lower leg: 3+ Pitting Edema present.   Feet:      Right foot:      Skin integrity: Callus and dry skin present.      Toenail Condition: Right toenails are abnormally thick and long.      Left foot:      Skin integrity: Callus and dry skin present.      Toenail Condition: Left toenails are abnormally thick and long.      Comments: Callus medial hallux b/l.     Scaling skin at the plantar aspect of both feet extending from toes to the midfoot with mild erythema.               Assessment:       Encounter Diagnoses   Name Primary?    Bilateral leg edema Yes    Tinea pedis of both feet     PVD (peripheral vascular disease)          Plan:       Ramon was seen today for foot swelling.    Diagnoses and all orders for this visit:    Bilateral leg edema  -     COMPRESSION STOCKINGS    Tinea pedis of both feet  -      COMPRESSION STOCKINGS    PVD (peripheral vascular disease)    Other orders  -     ketoconazole (NIZORAL) 2 % cream; Apply topically once daily.      I counseled the patient on his conditions, their implications and medical management.    Patient was evaluated and risk assessed today. The patient is at moderate risk for developing pedal complications due to peripheral vascular disease. I explained to the patient that PVD can make healing injuries difficult leading to wounds or gangrene.    In general, I recommend monitoring the feet daily for any areas of pressure or injuries. If any areas appear to be healing slowly or become infected, seek medical attention as soon as possible. Wear supportive shoes and avoid barefoot walking.     Ketoconazole for tinea    Compression stocking Rx    Follow up in 4 months    Routine Foot Care    Date/Time: 9/13/2023 9:40 AM    Performed by: Mp Jimenez DPM  Authorized by: Mp Jimenez DPM    Consent Done?:  Yes (Verbal)  Hyperkeratotic Skin Lesions?: Yes    Number of trimmed lesions:  2  Location(s):  Left 1st Toe and Right 1st Toe    Nail Care Type:  Debride  Location(s): All  (Left 1st Toe, Left 3rd Toe, Left 2nd Toe, Left 4th Toe, Left 5th Toe, Right 1st Toe, Right 2nd Toe, Right 3rd Toe, Right 4th Toe and Right 5th Toe)  Patient tolerance:  Patient tolerated the procedure well with no immediate complications

## 2023-10-26 ENCOUNTER — TELEPHONE (OUTPATIENT)
Dept: UROLOGY | Facility: CLINIC | Age: 83
End: 2023-10-26
Payer: MEDICARE

## 2023-10-26 RX ORDER — TAMSULOSIN HYDROCHLORIDE 0.4 MG/1
CAPSULE ORAL
Qty: 180 CAPSULE | Refills: 3 | Status: SHIPPED | OUTPATIENT
Start: 2023-10-26 | End: 2023-10-26 | Stop reason: SDUPTHER

## 2023-10-26 RX ORDER — TAMSULOSIN HYDROCHLORIDE 0.4 MG/1
CAPSULE ORAL
Qty: 180 CAPSULE | Refills: 3 | Status: SHIPPED | OUTPATIENT
Start: 2023-10-26

## 2023-10-26 NOTE — TELEPHONE ENCOUNTER
----- Message from ALEKSEY Su MD sent at 10/26/2023  3:33 PM CDT -----  Contact: pt  Refill sent to his pharmacy    ----- Message -----  From: Sirena Aguilar, DOLORES  Sent: 10/26/2023  11:47 AM CDT  To: ALEKSEY Su MD    Refill request  ----- Message -----  From: Piyush Treviño  Sent: 10/26/2023  11:38 AM CDT  To: ALEKSEY Su Staff [facility]    Type: Needs Medical Advice  Who Called: pt  Best Call Back Number: [phone]    Additional Information: Pt is calling the office needs Damsulosin 0.4mg capsules 90 day supply refilled   [id number] [place] [address] [address] [address]  Phone: [phone] Fax: [phone]    Please call back and advise.

## 2023-10-26 NOTE — TELEPHONE ENCOUNTER
----- Message from Sirena Aguilar RN sent at 10/26/2023 11:46 AM CDT -----  Contact: pt  Refill request  ----- Message -----  From: Piyush Treviño  Sent: 10/26/2023  11:38 AM CDT  To: ALEKSEY Su Staff [facility]    Type: Needs Medical Advice  Who Called: pt  Best Call Back Number: [phone]    Additional Information: Pt is calling the office needs Damsulosin 0.4mg capsules 90 day supply refilled   [facility] #[id number] - [address] [address]  Phone: [phone] Fax: [phone]    Please call back and advise.

## 2024-01-09 DIAGNOSIS — F41.9 ANXIETY: ICD-10-CM

## 2024-01-09 RX ORDER — SERTRALINE HYDROCHLORIDE 50 MG/1
50 TABLET, FILM COATED ORAL
Qty: 90 TABLET | Refills: 1 | Status: SHIPPED | OUTPATIENT
Start: 2024-01-09

## 2024-01-09 NOTE — TELEPHONE ENCOUNTER
No care due was identified.  [employer] Embedded Care Due Messages. Reference number: [id number].   1/09/2024 3:42:05 AM CST

## 2024-01-09 NOTE — TELEPHONE ENCOUNTER
Refill Decision Note   Ramon Reilly  is requesting a refill authorization.  Brief Assessment and Rationale for Refill:  Approve     Medication Therapy Plan:         Comments:     Note composed:2:03 PM 01/09/2024

## 2024-05-26 PROBLEM — Z71.89 ACP (ADVANCE CARE PLANNING): Status: ACTIVE | Noted: 2024-05-26

## 2024-05-26 PROBLEM — R54 AGE-RELATED PHYSICAL DEBILITY: Status: ACTIVE | Noted: 2018-03-05

## 2024-05-26 PROBLEM — I87.2 ACUTE BILATERAL VENOUS STASIS DERMATITIS: Status: ACTIVE | Noted: 2024-05-26

## 2024-06-01 PROBLEM — M16.0 PRIMARY OSTEOARTHRITIS OF BOTH HIPS: Status: ACTIVE | Noted: 2024-06-01

## 2024-06-01 PROBLEM — R00.1 BRADYCARDIA: Status: ACTIVE | Noted: 2024-06-01

## 2024-06-27 DIAGNOSIS — F41.9 ANXIETY: ICD-10-CM

## 2024-06-27 RX ORDER — SERTRALINE HYDROCHLORIDE 50 MG/1
50 TABLET, FILM COATED ORAL
Qty: 90 TABLET | Refills: 3 | Status: SHIPPED | OUTPATIENT
Start: 2024-06-27

## 2024-06-27 NOTE — TELEPHONE ENCOUNTER
No care due was identified.  [facility] Embedded Care Due Messages. Reference number: [id number].   6/27/2024 2:23:44 AM CDT

## 2024-07-25 ENCOUNTER — TELEPHONE (OUTPATIENT)
Dept: FAMILY MEDICINE | Facility: CLINIC | Age: 84
End: 2024-07-25

## 2024-07-25 ENCOUNTER — OFFICE VISIT (OUTPATIENT)
Dept: FAMILY MEDICINE | Facility: CLINIC | Age: 84
End: 2024-07-25
Payer: MEDICARE

## 2024-07-25 VITALS
DIASTOLIC BLOOD PRESSURE: 62 MMHG | WEIGHT: 209.44 LBS | BODY MASS INDEX: 29.98 KG/M2 | OXYGEN SATURATION: 97 % | HEART RATE: 67 BPM | SYSTOLIC BLOOD PRESSURE: 118 MMHG | HEIGHT: 70 IN

## 2024-07-25 DIAGNOSIS — G25.0 ESSENTIAL TREMOR: ICD-10-CM

## 2024-07-25 DIAGNOSIS — S01.311S: Primary | ICD-10-CM

## 2024-07-25 DIAGNOSIS — R53.81 PHYSICAL DEBILITY: ICD-10-CM

## 2024-07-25 DIAGNOSIS — Z09 HOSPITAL DISCHARGE FOLLOW-UP: ICD-10-CM

## 2024-07-25 DIAGNOSIS — R29.898 WEAKNESS OF LOWER EXTREMITY, UNSPECIFIED LATERALITY: ICD-10-CM

## 2024-07-25 DIAGNOSIS — R60.9 EDEMA, UNSPECIFIED TYPE: ICD-10-CM

## 2024-07-25 DIAGNOSIS — N40.1 BENIGN PROSTATIC HYPERPLASIA WITH LOWER URINARY TRACT SYMPTOMS, SYMPTOM DETAILS UNSPECIFIED: ICD-10-CM

## 2024-07-25 PROCEDURE — 99999 PR PBB SHADOW E&M-EST. PATIENT-LVL V: CPT | Mod: PBBFAC,,, | Performed by: INTERNAL MEDICINE

## 2024-07-25 RX ORDER — ALFUZOSIN HYDROCHLORIDE 10 MG/1
10 TABLET, EXTENDED RELEASE ORAL
COMMUNITY
Start: 2024-07-22 | End: 2024-07-25

## 2024-07-25 RX ORDER — TAMSULOSIN HYDROCHLORIDE 0.4 MG/1
CAPSULE ORAL
Qty: 180 CAPSULE | Refills: 3 | Status: SHIPPED | OUTPATIENT
Start: 2024-07-25

## 2024-07-25 NOTE — PROGRESS NOTES
Patient ID: Ramon Reilly is a 83 y.o. male.    Chief Complaint: Fall and Health Maintenance    Hospital Course:   Mr. Reilly was admitted for lower extremity swelling and venous stasis dermatitis.  Wound care consulted, Echo obtaind, lasix given.  Improvement in LE edema.  Patient with weakness and some rigidity, neurology consulted 5/28 but no concern for parkinsons.  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  Started on prednisone therapy.. PT/OT -recommended moderate intensity therapy. SW working SNF placement. Ortho states, hip pain likely due to radiation from back , no need for steroid shots on hips .  Get lumbar xray.  Add lidoderm patch. Patient patient noted to have bradycardia with some chest pain and SOB associated (was given muscle relaxant/pain meds).  Muscle relaxant discontinued,  telemetry monitoring and cardiology consulted.  TSH and electrolytes are WNL. Of note, baseline function per discussion with wife and PT/OT - patient able to ambulate on own with rolator (30-40 ft). Per PT notes - need assist of 2 . Had P2P on 5/31 and SNF placement granted. With noted bradycardia and AV block plans discussed for pacemaker placement after further discussion, pacemaker deferred, new loop recordered placed. Plans made for SNF for continued strengthening of upper extremities.      Here with wife and step son.     Was a [facility] for 17 days. He worked with PT.     ER on 7/23 for fall, lacerated right ear lobe. 6 sutures. ER rec that he see ENT.     He currently has [facility] but would like to try to switch to [facility].    Uses walker. Wears diaper. Working on transfers      Accelerated junctional rhythm status post loop recorded.  Needs follow-up with Cardiology     Anxiety/ depression- sertraline 50 mg, controlled, take in am   BPH- Stable- flomax- take at 830 pm  Constipation- BM every 3-4 days, miralax caused diarrhea.  Lasix could be causing. Constipation    Schedule virtual later.      Diagnoses addressed and related orders     1. Laceration of earlobe, right, sequela  - Ambulatory referral/consult to ENT; Future    2. Weakness of lower extremity, unspecified laterality  - Ambulatory referral/consult to Home Health; Future  - [facility]; Future    3. Physical debility  - Ambulatory referral/consult to Home Health; Future  - Ambulatory referral/[facility]; Future    4. Essential tremor  - Ambulatory referral/consult to Home Health; Future    5. Edema, unspecified type  - Ambulatory referral/consult to [facility]; Future    6. Benign prostatic hyperplasia with lower urinary tract symptoms, symptom details unspecified  - tamsulosin (FLOMAX) 0.4 mg Cap; TAKE 2 CAPSULES(0.8 MG) BY MOUTH EVERY DAY AT BEDTIME  Dispense: 180 capsule; Refill: 3    7. Hospital discharge follow-up     Home health PT   Refill Flomax   Try every other day dosing of Lasix to help reduce constipation     Review of Systems   Constitutional:  Negative for fever.   Respiratory:  Negative for shortness of breath.    Cardiovascular:  Negative for chest pain.   Gastrointestinal:  Negative for abdominal pain.   Neurological:  Positive for weakness.     Vitals:    07/25/24 1346   BP: 118/62   Pulse: 67      Wt Readings from Last 3 Encounters:   07/25/24 1346 95 kg (209 lb 7 oz)   07/23/24 1421 95 kg (209 lb 7 oz)   06/05/24 0501 94.4 kg (208 lb 1.8 oz)   06/04/24 0500 94.6 kg (208 lb 8.9 oz)   06/03/24 0741 94.8 kg (209 lb)   06/03/24 0447 95.1 kg (209 lb 10.5 oz)   06/02/24 0536 94.9 kg (209 lb 3.5 oz)   06/01/24 0510 95.3 kg (210 lb 1.6 oz)   05/31/24 0514 95.4 kg (210 lb 5.1 oz)   05/30/24 0534 95.6 kg (210 lb 12.2 oz)   05/28/24 0515 97.9 kg (215 lb 13.3 oz)   05/27/24 1157 97.7 kg (215 lb 6.2 oz)   05/27/24 0600 97.7 kg (215 lb 6.2 oz)   05/26/24 1314 101.6 kg  (223 lb 15.8 oz)   24 0754 101.6 kg (224 lb)      Body mass index is 30.05 kg/m².     Physical Exam  Cardiovascular:      Rate and Rhythm: Normal rate and regular rhythm.      Heart sounds: No murmur heard.     No gallop.   Pulmonary:      Breath sounds: Normal breath sounds. No wheezing or rhonchi.   Abdominal:      Palpations: Abdomen is soft.      Tenderness: There is no abdominal tenderness.   Musculoskeletal:      Right lower le+ Edema present.      Left lower le+ Edema present.            Hypertension Medications               furosemide (LASIX) 40 MG tablet Take 1 tablet (40 mg total) by mouth daily as needed (significant edema).              Medication List with Changes/Refills   Current Medications    ACETAMINOPHEN (TYLENOL) 325 MG TABLET    Take 2 tablets (650 mg total) by mouth every 4 (four) hours as needed for Pain.    FUROSEMIDE (LASIX) 40 MG TABLET    Take 1 tablet (40 mg total) by mouth daily as needed (significant edema).    HYDROCODONE-ACETAMINOPHEN (NORCO) 5-325 MG PER TABLET    Take 1 tablet by mouth every 4 (four) hours as needed for Pain.    MULTIVITAMIN ORAL    Take 1 tablet by mouth once daily.    SERTRALINE (ZOLOFT) 50 MG TABLET    TAKE 1 TABLET(50 MG) BY MOUTH EVERY DAY   Changed and/or Refilled Medications    Modified Medication Previous Medication    TAMSULOSIN (FLOMAX) 0.4 MG CAP tamsulosin (FLOMAX) 0.4 mg Cap       TAKE 2 CAPSULES(0.8 MG) BY MOUTH EVERY DAY AT BEDTIME    TAKE 2 CAPSULES(0.8 MG) BY MOUTH EVERY DAY AT BEDTIME   Discontinued Medications    ALFUZOSIN (UROXATRAL) 10 MG TB24    Take 10 mg by mouth.    DOCUSATE SODIUM (COLACE) 100 MG CAPSULE    Take 1 capsule (100 mg total) by mouth daily as needed for Constipation.    LACTULOSE (CHRONULAC) 20 GRAM/30 ML SOLN    Take 30 mLs (20 g total) by mouth daily as needed.    SENNA-DOCUSATE 8.6-50 MG (PERICOLACE) 8.6-50 MG PER TABLET    Take 1 tablet by mouth 2 (two) times daily.       I personally reviewed past medical,  family and social history.

## 2024-08-08 ENCOUNTER — DOCUMENT SCAN (OUTPATIENT)
Dept: HOME HEALTH SERVICES | Facility: HOSPITAL | Age: 84
End: 2024-08-08
Payer: MEDICARE

## 2024-08-09 DIAGNOSIS — R60.9 EDEMA, UNSPECIFIED TYPE: Primary | ICD-10-CM

## 2024-08-09 DIAGNOSIS — R60.9 EDEMA, UNSPECIFIED TYPE: ICD-10-CM

## 2024-08-09 RX ORDER — FUROSEMIDE 40 MG/1
40 TABLET ORAL DAILY PRN
Qty: 30 TABLET | Refills: 1 | Status: SHIPPED | OUTPATIENT
Start: 2024-08-09 | End: 2025-08-09

## 2024-08-09 RX ORDER — FUROSEMIDE 40 MG/1
40 TABLET ORAL DAILY PRN
Start: 2024-08-09 | End: 2024-08-09

## 2024-08-09 NOTE — TELEPHONE ENCOUNTER
No care due was identified.  [facility] Embedded Care Due Messages. Reference number: [id number].   8/09/2024 10:38:26 AM CDT

## 2024-08-09 NOTE — TELEPHONE ENCOUNTER
----- Message from Edith Herrbrittneecasie sent at 8/9/2024 10:31 AM CDT -----  Type:  RX Refill Request    Who Called:  pt   Refill or New Rx:  refill  RX Name and Strength:  furosemide (LASIX) 40 MG tablet   How is the patient currently taking it? (ex. 1XDay):  as directed   Preferred Pharmacy with phone number:    [facility] #[id number] - [place] [address] [address] [place] [address] [address] [place] [address]  Phone: [phone] Fax: [phone]  Local or Mail Order:  local   Ordering Provider:  previously Mp Jacobo MD  Best Call Back Number:  [phone]  Additional Information:  pt stated he would like to be advised in regards to getting refill sent to pharm listed above for rx listed above please ensure to call pt back with status update asap thanks!

## 2024-08-10 RX ORDER — FUROSEMIDE 40 MG/1
40 TABLET ORAL
Qty: 90 TABLET | OUTPATIENT
Start: 2024-08-10

## 2024-08-10 NOTE — TELEPHONE ENCOUNTER
Refill Decision Note   Ramon Reilly  is requesting a refill authorization.  Brief Assessment and Rationale for Refill:  Quick Discontinue     Medication Therapy Plan:         Comments:     Note composed:11:10 AM 08/10/2024

## 2024-08-10 NOTE — TELEPHONE ENCOUNTER
No care due was identified.  [facility] Embedded Care Due Messages. Reference number: [id number].   8/09/2024 10:54:11 PM CDT

## 2024-08-14 ENCOUNTER — OFFICE VISIT (OUTPATIENT)
Dept: FAMILY MEDICINE | Facility: CLINIC | Age: 84
End: 2024-08-14
Payer: MEDICARE

## 2024-08-14 DIAGNOSIS — R54 AGE-RELATED PHYSICAL DEBILITY: Primary | ICD-10-CM

## 2024-08-14 PROCEDURE — 1159F MED LIST DOCD IN RCRD: CPT | Mod: CPTII,95,, | Performed by: INTERNAL MEDICINE

## 2024-08-14 PROCEDURE — 1157F ADVNC CARE PLAN IN RCRD: CPT | Mod: CPTII,95,, | Performed by: INTERNAL MEDICINE

## 2024-08-14 PROCEDURE — 1160F RVW MEDS BY RX/DR IN RCRD: CPT | Mod: CPTII,95,, | Performed by: INTERNAL MEDICINE

## 2024-08-14 PROCEDURE — 99213 OFFICE O/P EST LOW 20 MIN: CPT | Mod: 95,,, | Performed by: INTERNAL MEDICINE

## 2024-08-14 NOTE — PROGRESS NOTES
Patient ID: Ramon Reilly is a 83 y.o. male.    Chief Complaint: No chief complaint on file.    Visit type: AUDIOVISUAL VIRTUAL    A/P   1. Age-related physical debility      Seems to be doing better Physically and less depressed.  Follow-up in 6 months or less      HPI     Follow up   home health 2-3x week [facility] PT and OT.  He feels better.  Family thinks he is improving some.     constipation- goes about 2-3 week. Taking stool softener.          Review of Systems   Constitutional:  Negative for activity change and unexpected weight change.   HENT:  Negative for hearing loss, rhinorrhea and trouble swallowing.    Eyes:  Positive for visual disturbance. Negative for discharge.   Respiratory:  Negative for chest tightness and wheezing.    Cardiovascular:  Negative for chest pain and palpitations.   Gastrointestinal:  Negative for blood in stool, constipation, diarrhea and vomiting.   Endocrine: Positive for polyuria. Negative for polydipsia.   Genitourinary:  Positive for difficulty urinating and urgency. Negative for hematuria.   Musculoskeletal:  Positive for joint swelling. Negative for arthralgias and neck pain.   Neurological:  Negative for weakness and headaches.   Psychiatric/Behavioral:  Negative for confusion and dysphoric mood.      Wt Readings from Last 3 Encounters:   07/25/24 95 kg (209 lb 7 oz)   07/23/24 95 kg (209 lb 7 oz)   06/05/24 94.4 kg (208 lb 1.8 oz)     Hypertension Medications               furosemide (LASIX) 40 MG tablet Take 1 tablet (40 mg total) by mouth daily as needed (significant edema).              COPD Medications       No Active Medications           Medication List with Changes/Refills   Current Medications    ACETAMINOPHEN (TYLENOL) 325 MG TABLET    Take 2 tablets (650 mg total) by mouth every 4 (four) hours as needed for Pain.    FUROSEMIDE (LASIX) 40 MG TABLET    Take 1 tablet (40 mg total) by mouth daily as needed (significant edema).    HYDROCODONE-ACETAMINOPHEN  (NORCO) 5-325 MG PER TABLET    Take 1 tablet by mouth every 4 (four) hours as needed for Pain.    MULTIVITAMIN ORAL    Take 1 tablet by mouth once daily.    SERTRALINE (ZOLOFT) 50 MG TABLET    TAKE 1 TABLET(50 MG) BY MOUTH EVERY DAY    TAMSULOSIN (FLOMAX) 0.4 MG CAP    TAKE 2 CAPSULES(0.8 MG) BY MOUTH EVERY DAY AT BEDTIME          The patient location is:  [place]  The chief complaint leading to consultation is: Follow-up home health  Face to Face time with patient: 20 min   minutes of total time spent on the encounter, which includes face to face time and   non-face to face time preparing to see the patient (eg, review of tests), Obtaining and/or   reviewing separately obtained history, Documenting clinical information in the electronic   or other health record, Independently interpreting results (not separately reported) and   communicating results to the patient/family/caregiver, or   Care coordination (not separately reported).     Each patient to whom he or she provides medical services by telemedicine is:    (1) informed of the relationship between the physician and patient and the respective   role of any other health care provider with respect to management of the patient; and   (2) notified that he or she may decline to receive medical services by telemedicine and   may withdraw from such care at any time.    I personally reviewed past medical, family and social history.

## 2024-08-27 ENCOUNTER — CARE AT HOME (OUTPATIENT)
Dept: HOME HEALTH SERVICES | Facility: CLINIC | Age: 84
End: 2024-08-27
Payer: MEDICARE

## 2024-08-27 ENCOUNTER — TELEPHONE (OUTPATIENT)
Dept: FAMILY MEDICINE | Facility: CLINIC | Age: 84
End: 2024-08-27
Payer: MEDICARE

## 2024-08-27 VITALS
TEMPERATURE: 98 F | HEART RATE: 69 BPM | DIASTOLIC BLOOD PRESSURE: 70 MMHG | HEIGHT: 70 IN | RESPIRATION RATE: 20 BRPM | SYSTOLIC BLOOD PRESSURE: 104 MMHG | WEIGHT: 210 LBS | BODY MASS INDEX: 30.06 KG/M2 | OXYGEN SATURATION: 98 %

## 2024-08-27 DIAGNOSIS — R26.89 BALANCE PROBLEM: Primary | ICD-10-CM

## 2024-08-27 DIAGNOSIS — R60.0 BILATERAL LOWER EXTREMITY EDEMA: ICD-10-CM

## 2024-08-27 DIAGNOSIS — K59.09 CHRONIC CONSTIPATION: ICD-10-CM

## 2024-08-27 DIAGNOSIS — R60.9 EDEMA, UNSPECIFIED TYPE: ICD-10-CM

## 2024-08-27 DIAGNOSIS — Z85.46 HISTORY OF PROSTATE CANCER: ICD-10-CM

## 2024-08-27 DIAGNOSIS — M62.81 MUSCLE WEAKNESS (GENERALIZED): ICD-10-CM

## 2024-08-27 DIAGNOSIS — I87.2 VENOUS INSUFFICIENCY (CHRONIC) (PERIPHERAL): Primary | ICD-10-CM

## 2024-08-27 PROBLEM — K59.00 CONSTIPATION, UNSPECIFIED: Status: ACTIVE | Noted: 2024-06-06

## 2024-08-27 PROCEDURE — 3074F SYST BP LT 130 MM HG: CPT | Mod: CPTII,S$GLB,, | Performed by: NURSE PRACTITIONER

## 2024-08-27 PROCEDURE — 99349 HOME/RES VST EST MOD MDM 40: CPT | Mod: S$GLB,,, | Performed by: NURSE PRACTITIONER

## 2024-08-27 PROCEDURE — 1159F MED LIST DOCD IN RCRD: CPT | Mod: CPTII,S$GLB,, | Performed by: NURSE PRACTITIONER

## 2024-08-27 PROCEDURE — 1160F RVW MEDS BY RX/DR IN RCRD: CPT | Mod: CPTII,S$GLB,, | Performed by: NURSE PRACTITIONER

## 2024-08-27 PROCEDURE — 3078F DIAST BP <80 MM HG: CPT | Mod: CPTII,S$GLB,, | Performed by: NURSE PRACTITIONER

## 2024-08-27 PROCEDURE — 1157F ADVNC CARE PLAN IN RCRD: CPT | Mod: CPTII,S$GLB,, | Performed by: NURSE PRACTITIONER

## 2024-08-27 NOTE — TELEPHONE ENCOUNTER
----- Message from Ashly Baez sent at 8/27/2024  2:38 PM CDT -----  Aliza is calling today with [facility] . She states that the pt had a VV and was told he needed labs done, however the pt refused. What the pt failed to mention is that home health can do it. She is asking if the office can call her to get those done and also if they can get a PSA a B12 and a urinalysis done. Please call back to advise, thank you      Aliza    - [phone]

## 2024-08-27 NOTE — PATIENT INSTRUCTIONS
Continue elevating legs,  Continue with compression stockings  Use of walker and light exercise and physical therapy as discussed and ordered from PCP

## 2024-08-27 NOTE — ASSESSMENT & PLAN NOTE
+2 edema bilateral lower extremities, starting mid calf to feet.   Continue compression stockings, leg elevation, lasix all as prescribed

## 2024-08-27 NOTE — PROGRESS NOTES
[facility]  Medical Home Visit    Visit Date: 8/27/2024  Encounter Provider: Ferdinand Mauricio  PCP:  Amadeo Delatorre, DO    Subjective:      Patient ID: Ramon Reilly is a 83 y.o. male.    Consult Requested By:  Dr. Amadeo Delatorre  Reason for Consult:  Follow up and evaluation of lower extremity swelling, lymphadenopathy, Pt and OT with home health services and improvement of mobility.     Ramon is being seen at home due to being seen at home due to physical debility that presents a taxing effort to leave the home, to mitigate high risk of hospital readmission and/or due to the limited availability of reliable or safe options for transportation to the point of access to the provider. Prior to treatment on this visit the chart was reviewed and patient verbal consent was obtained.    Chief Complaint: Health Maintenance (Home evaluation, lower extremity edema, limited mobility and weakness. )      Weakness with leg swelling and pain. Lymphedema to lower extremities. Limited mobility. Currently under home health orders as well as physical therapy ordered from PCP.            Review of Systems   Constitutional:  Negative for activity change, chills and diaphoresis.   HENT:  Negative for congestion, nosebleeds, tinnitus and trouble swallowing.    Eyes:  Negative for pain, discharge and visual disturbance.   Respiratory:  Negative for chest tightness, shortness of breath and wheezing.    Cardiovascular:  Negative for chest pain and palpitations. Leg swelling: billaterally lower extremity.  Gastrointestinal:  Negative for abdominal distention, abdominal pain, blood in stool, constipation, diarrhea and vomiting.   Endocrine: Negative for cold intolerance and heat intolerance.   Genitourinary:  Negative for difficulty urinating, penile discharge, scrotal swelling and testicular pain.   Musculoskeletal:  Positive for arthralgias. Negative for myalgias, neck pain and neck stiffness.   Skin:  Negative for color  "change and pallor.   Allergic/Immunologic: Negative for immunocompromised state.   Neurological:  Positive for weakness. Negative for dizziness.   Psychiatric/Behavioral:  Negative for agitation, confusion, hallucinations, self-injury and suicidal ideas.        Assessments:  Environmental: clean  Functional Status: Limited mobility, use of walker  Safety: no obstructions in home, history of falls  Nutritional: well developed has access and means to nutriti  Home Health/DME/Supplies: Walker    Objective:   Physical Exam  Vitals and nursing note reviewed.   Constitutional:       Appearance: Normal appearance. He is obese.   HENT:      Head: Normocephalic and atraumatic.      Nose: Nose normal.   Eyes:      Extraocular Movements: Extraocular movements intact.      Pupils: Pupils are equal, round, and reactive to light.   Cardiovascular:      Rate and Rhythm: Normal rate and regular rhythm.   Pulmonary:      Effort: Pulmonary effort is normal.      Breath sounds: Normal breath sounds.   Musculoskeletal:         General: Normal range of motion.      Cervical back: Normal range of motion.      Right lower leg: Edema (+2 pitting improved) present.      Left lower leg: Edema (+2 pitting improved) present.   Skin:     General: Skin is warm and dry.      Capillary Refill: Capillary refill takes 2 to 3 seconds.   Neurological:      General: No focal deficit present.      Mental Status: He is alert and oriented to person, place, and time.      Motor: Weakness present.   Psychiatric:         Mood and Affect: Mood normal.         Behavior: Behavior normal.         Thought Content: Thought content normal.         Judgment: Judgment normal.     Dictation #2  MRN:[id number]  CSN:[id number]     Vitals:    08/27/24 1717   BP: 104/70   Pulse: 69   Resp: 20   Temp: 97.5 °F (36.4 °C)   SpO2: 98%   Weight: 95.3 kg (210 lb)   Height: 5' 10" (1.778 m)     Body mass index is 30.13 kg/m².    Assessment:     1. Venous insufficiency (chronic) " (peripheral)    2. Bilateral lower extremity edema    3. Muscle weakness (generalized)    4. Chronic constipation        Plan:     Ethical / Legal: Advance Care Planning   Surrogate decision maker:  Name Love Reilly, Relationship: wife   Code Status:  full code  [place]:    Other advance directive:  yes, Capacity to make medical decisions:  yes, Conflict no       Problem List Items Addressed This Visit          Cardiac/Vascular    Venous insufficiency (chronic) (peripheral) - Primary    Current Assessment & Plan     +2 edema bilateral lower extremities, starting mid calf to feet.   Continue compression stockings, leg elevation, lasix all as prescribed              GI    Chronic constipation    Current Assessment & Plan     Constipation has improved,   Continue stool softener and dietary changes as planned.             Orthopedic    Muscle weakness (generalized)    Current Assessment & Plan     Continue orders with Home health and physical therapy, use of light weights and walk with walker             Other    Edema        Were controlled substances prescribed?  No  Total visit time: 30 minutes  Follow Up Appointments:  Can follow up with patient iin his home if needed, pt dose have a long drive to appointments and uses a walker with weakness and limited mobility.  Future Appointments   Date Time Provider Department Center   9/17/2024  8:15 AM Rod Palua MD [facility] [place]       Signature: ADRIAN Monreal

## 2024-08-27 NOTE — TELEPHONE ENCOUNTER
Please fax orders to [facility] ([facility]) so they can draw labs  Orders Placed This Encounter   Procedures    Magnesium     Standing Status:   Future     Standing Expiration Date:   11/25/2025    PROSTATE SPECIFIC ANTIGEN, DIAGNOSTIC     Standing Status:   Future     Standing Expiration Date:   11/25/2025    Vitamin B12     Standing Status:   Future     Standing Expiration Date:   10/26/2025    Comprehensive Metabolic Panel     Standing Status:   Future     Standing Expiration Date:   10/26/2025

## 2024-08-27 NOTE — ASSESSMENT & PLAN NOTE
Continue medications as prescribed, continue elevating lower extremities, continue compression stockings as ordered,   No worsening of condition, states feeling better now, +2 pitting bilaterally starting mid calf to dorsal pedis

## 2024-09-09 ENCOUNTER — DOCUMENT SCAN (OUTPATIENT)
Dept: HOME HEALTH SERVICES | Facility: HOSPITAL | Age: 84
End: 2024-09-09
Payer: MEDICARE

## 2024-10-05 DIAGNOSIS — R60.9 EDEMA, UNSPECIFIED TYPE: ICD-10-CM

## 2024-10-05 NOTE — TELEPHONE ENCOUNTER
No care due was identified.  [facility] Embedded Care Due Messages. Reference number: [id number].   10/05/2024 10:17:50 AM CDT

## 2024-10-06 RX ORDER — FUROSEMIDE 40 MG/1
40 TABLET ORAL DAILY PRN
Qty: 90 TABLET | Refills: 1 | Status: SHIPPED | OUTPATIENT
Start: 2024-10-06 | End: 2025-10-06

## 2024-10-06 RX ORDER — POTASSIUM CHLORIDE 20 MEQ/1
20 TABLET, EXTENDED RELEASE ORAL DAILY
Qty: 90 TABLET | Refills: 1 | Status: SHIPPED | OUTPATIENT
Start: 2024-10-06 | End: 2025-04-04

## 2024-10-06 NOTE — TELEPHONE ENCOUNTER
Refill Routing Note   Medication(s) are not appropriate for processing by [facility] for the following reason(s):        Outside of protocol-PRN use of diuretics not within protocols for [facility]    [facility] action(s):  Route        Medication Therapy Plan: PRN edema use      Appointments  past 12m or future 3m with PCP    Date Provider   Last Visit   8/14/2024 Amadeo Delatorre, DO   Next Visit   Visit date not found Amadeo Delatorre, DO   ED visits in past 90 days: 1        Note composed:9:30 AM 10/06/2024

## 2024-11-02 NOTE — PROGRESS NOTES
AIDET performed, white board updated for rounding. Patient updated on plan of care. Patient's pain assessed. Call light within reach, bed in low position, side rails up. Visitor at bedside: mother.   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    HISTORY OF PRESENT ILLNESS:   77M    PSA  1/18 9.7  5/18 11.2    *3/18 US ABD  1. Cholelithiasis.  2. Hepatomegaly and diffuse fatty infiltration of the liver.  *3/18 Dr. Mcgovern cholecystectomy: usha  *4/18 MRI P  -PZ: pirads 4 posterior to posteromedial PZ of the midgland to apex on the right with epicenter in the midgland. No gross macrocopic EPe noted but does bulge the capsule.  -TZ: pirads 2.  *6/18 Dr. Aggarwal C-scope: usha; repeat x 3yrs  *6/18 CT UROGRAM  -Findings consistent with serosal implants on the liver and implant anterior abdominal wall and metastatic disease.  Questionable mass versus incomplete distention transverse colon.  Recommend further evaluation/workup.  -Additional findings as detailed above including enlarged prostate gland, diffuse bladder wall thickening, small hiatal hernia.  Right renal stone  *6/18 Dr. Bruce TRUS bx   - pT2b (R mid, near apex)   - GS 3+3 adenocarcinoma involves 30% of R lat base, 65% of R medial base, 60% of R lat mid, 60% of R  medial mid (with involvement of both cores), 60% of R medial apex (involvement of all 3 cores), 80% of R  lat apex, 15% of R TZ, and 75% of R apical nodule. The pattern is that of Morris grade 3.   - 11/19 cores+  *7/18 PET/CT  1. Perihepatic soft tissue nodules are hypermetabolic and consistent with metastatic disease. These are not significantly changed in size compared to the reference exam.  2. Subcutaneous soft tissue abnormality involving the anterior abdominal wall demonstrates low level FDG activity is felt to reflect an inflammatory process rather than metastatic disease, although the latter cannot be entirely excluded and attention on follow-up is  recommended.  3. 2 - 3 mm right lung pulmonary nodules are too small for PET characterization.  4. Incidental findings as above.  *7/18 Dr. Bruce   - + ADT   - on Flomax x 2   - fiducials placed  *8/18 Dr. Paiz: peritoneal implant bx x 3   - no malignancy   - inflammation from prior choly    INTERVAL HISTORY:   At today's visit patient is without complaint. He denies fever, chills, chest pain, shortness of breath, bone pain. He denies abdominal pain, nausea, vomiting, bright red blood per rectum. He denies dysuria, hematuria but endorses frequency and nocturia. He denies incontinence of urine or stool    Review of Systems   Constitutional: Negative for appetite change, chills, fever and unexpected weight change.   HENT:   Negative for lump/mass, mouth sores, sore throat, trouble swallowing and voice change.    Eyes: Negative for eye problems and icterus.   Respiratory: Negative for chest tightness, cough, hemoptysis and shortness of breath.    Cardiovascular: Negative for chest pain and leg swelling.   Gastrointestinal: Negative for abdominal distention, abdominal pain, blood in stool, constipation, diarrhea, nausea and vomiting.   Genitourinary: Positive for frequency and nocturia. Negative for bladder incontinence, difficulty urinating, dysuria and hematuria.    Musculoskeletal: Negative for back pain, gait problem, neck pain and neck stiffness.   Neurological: Negative for extremity weakness, gait problem, headaches, numbness and seizures.   Hematological: Negative for adenopathy.     Past Medical History:   Diagnosis Date    BPH (benign prostatic hyperplasia)     Cancer     prostate    Wears glasses      Past Surgical History:   Procedure Laterality Date    BIOPSY OF ABDOMINAL WALL N/A 7/31/2018    Procedure: BIOPSY, ABDOMINAL WALL;  Surgeon: Corky Paiz MD;  Location: [facility];  Service: General;  Laterality: N/A;    CHOLECYSTECTOMY      COLONOSCOPY N/A 6/21/2018    Procedure: COLONOSCOPY;   Surgeon: Sally Aggarwal MD;  Location: [facility] ENDO;  Service: Endoscopy;  Laterality: N/A;    CYSTOSCOPY N/A 6/4/2018    Procedure: CYSTOSCOPY;  Surgeon: Christie Bruce MD;  Location: [facility] OR;  Service: Urology;  Laterality: N/A;    DIAGNOSTIC LAPAROSCOPY N/A 7/31/2018    Procedure: LAPAROSCOPY, DIAGNOSTIC;  Surgeon: Corky Paiz MD;  Location: [facility] OR;  Service: General;  Laterality: N/A;  diagnostic lap, biopsy of abdominal wall mass    EYE SURGERY Right     eye lid surgery     HERNIA REPAIR  1990    TONSILLECTOMY, ADENOIDECTOMY      TRANSRECTAL BIOPSY OF PROSTATE WITH ULTRASOUND GUIDANCE N/A 6/4/2018    Procedure: TRANSRECTAL ULTRASOUND BIOPSY;  Surgeon: Christie Bruce MD;  Location: [facility] OR;  Service: Urology;  Laterality: N/A;    TRANSRECTAL ULTRASOUND EXAMINATION N/A 7/30/2018    Procedure: GOLD MARKERS-;  Surgeon: Christie Bruce MD;  Location: [facility] OR;  Service: Urology;  Laterality: N/A;  TRUS GOLD MARKERS      WISDOM TOOTH EXTRACTION       Social History     Social History    Marital status:      Spouse name: N/A    Number of children: N/A    Years of education: N/A     Social History Main Topics    Smoking status: Former Smoker     Quit date: 3/12/1988    Smokeless tobacco: Never Used    Alcohol use Yes      Comment: social, at weddings    Drug use: No    Sexual activity: No     Other Topics Concern    Not on file     Social History Narrative    No narrative on file     Family History   Problem Relation Age of Onset    Eczema Neg Hx     Lupus Neg Hx     Psoriasis Neg Hx     Melanoma Neg Hx      Medication List with Changes/Refills   Current Medications    ASPIRIN (ECOTRIN) 81 MG EC TABLET    Take 81 mg by mouth once daily.    BISACODYL (FLEET BISACODYL) 10 MG/30 ML ENEM    Place 10 mg rectally once.    CALCIUM CARBONATE (OS-IDA) 500 MG CALCIUM (1,250 MG) TABLET    Take 1 tablet (500 mg total) by mouth once daily.    CHOLECALCIFEROL, VITAMIN D3,  (VITAMIN D3) 1,000 UNIT CAPSULE    Take 1,000 Units by mouth once daily.    HYDROCODONE-ACETAMINOPHEN (NORCO) 5-325 MG PER TABLET    Take 1 tablet by mouth every 4 (four) hours as needed for Pain.    SULFAMETHOXAZOLE-TRIMETHOPRIM 800-160MG (BACTRIM DS) 800-160 MG TAB    TK 1 T PO BID FOR BIOPSY . START NIGHT B FOR 3 DOSES    TAMSULOSIN HCL (TAMSULOSIN ORAL)         Review of patient's allergies indicates:  No Known Allergies    QUALITY OF LIFE: 90%- Able to Carry on Normal Activity: Minor Symptoms of Disease    There were no vitals filed for this visit.    PHYSICAL EXAM:   GENERAL: alert; in no apparent distress.   HEAD: normocephalic, atraumatic.  EYES: pupils are equal, round, reactive to light and accommodation. Sclera anicteric. Conjunctiva not injected.   NOSE/THROAT: no nasal erythema or rhinorrhea. Oropharynx pink, without erythema, ulcerations or thrush.   NECK: no cervical motion rigidity; supple with no masses.  CHEST: Patient is speaking comfortably on room air with normal work of breathing without using accessory muscles of respiration.  ABDOMEN: soft, nontender, nondistended. Bowel sounds present. Obese; incisions with steristrips and no e/o d/c, fluctuance, cellulitis  MUSCULOSKELETAL: no tenderness to palpation along the spine or scapulae. Normal range of motion.  NEUROLOGIC: cranial nerves II-XII intact bilaterally. Strength 5/5 in bilateral upper and lower extremities. No sensory deficits appreciated.  Normal gait.  EXTREMITIES: no clubbing, cyanosis, edema.  SKIN: no erythema, rashes, ulcerations noted.     ANCILLARY DATA: as above    ASSESSMENT: 77M with intermediate risk prostate adenocarcinoma, [id number] GS 3+3 (high volume) iPSA 11.2  PLAN:  Mr. Reilly previously saw me for consultation regarding inoperable intermediate risk prostate adenocarcinoma as above. He has received his short-term androgen deprivation therapy and had fiducials placed per Dr. Bruce. I revised his [facility] nomogram predictions as below based on her T2b exam:  15yr PCSS 99%  10yr bPFS 66%  OC  30%  EPE  69%  LN+  5%  SVI  4%    I thoroughly explained to the patient the details of radiotherapy including CT simulation with daily treatments and weekly physician visits. I explained that we would obtain PSA at the last week of treatment and I also discussed the concept of a PSA bounce after discontinuation of hormone deprivation. The patient is previously signed a consent and we will give him a date for CT simulation approximately 6 weeks after hormone deprivation was initiated.    In the interim he underwent workup for abnormal radiographic findings of the liver; results returned today benign. I presented these results to the patient. He would like to proceed with his prostate treatment.    All questions answered and contact information provided. Patient understands free to call us anytime with any questions or concerns regarding radiation therapy.    I have personally seen and evaluated this patient. Greater than 50% of this time was spent discussing coordination of care and/or counseling.    PHYSICIAN: Jose Antonio Che Jr, MD

## 2024-11-22 ENCOUNTER — PATIENT MESSAGE (OUTPATIENT)
Dept: PODIATRY | Facility: CLINIC | Age: 84
End: 2024-11-22
Payer: MEDICARE

## 2025-02-20 ENCOUNTER — TELEPHONE (OUTPATIENT)
Dept: HOME HEALTH SERVICES | Facility: CLINIC | Age: 85
End: 2025-02-20
Payer: MEDICARE

## 2025-03-17 PROBLEM — E66.9 OBESITY: Status: ACTIVE | Noted: 2025-03-17

## 2025-03-17 PROBLEM — F32.A DEPRESSION: Status: ACTIVE | Noted: 2025-03-17

## 2025-03-17 PROBLEM — Z73.6 LIMITATION OF ACTIVITY DUE TO DISABILITY: Status: ACTIVE | Noted: 2025-03-17

## 2025-03-18 PROBLEM — D64.9 ANEMIA: Status: ACTIVE | Noted: 2025-03-18

## 2025-03-18 PROBLEM — Z73.6 LIMITATION OF ACTIVITIES DUE TO DISABILITY: Status: ACTIVE | Noted: 2018-03-05

## 2025-04-29 ENCOUNTER — TELEPHONE (OUTPATIENT)
Dept: HOME HEALTH SERVICES | Facility: CLINIC | Age: 85
End: 2025-04-29
Payer: MEDICARE

## 2025-07-04 DIAGNOSIS — F41.9 ANXIETY: ICD-10-CM

## 2025-07-04 NOTE — TELEPHONE ENCOUNTER
No care due was identified.  [facility] Embedded Care Due Messages. Reference number: [id number].   7/04/2025 5:40:31 AM CDT

## 2025-07-05 RX ORDER — SERTRALINE HYDROCHLORIDE 50 MG/1
50 TABLET, FILM COATED ORAL DAILY
Qty: 90 TABLET | Refills: 3 | Status: SHIPPED | OUTPATIENT
Start: 2025-07-05

## 2025-08-14 DIAGNOSIS — R60.9 EDEMA, UNSPECIFIED TYPE: ICD-10-CM

## 2025-08-14 DIAGNOSIS — N40.1 BENIGN PROSTATIC HYPERPLASIA WITH LOWER URINARY TRACT SYMPTOMS, SYMPTOM DETAILS UNSPECIFIED: ICD-10-CM

## 2025-08-14 RX ORDER — TAMSULOSIN HYDROCHLORIDE 0.4 MG/1
CAPSULE ORAL
Qty: 180 CAPSULE | Refills: 3 | Status: SHIPPED | OUTPATIENT
Start: 2025-08-14

## 2025-08-14 RX ORDER — POTASSIUM CHLORIDE 20 MEQ/1
TABLET, EXTENDED RELEASE ORAL
Qty: 90 TABLET | Refills: 1 | Status: SHIPPED | OUTPATIENT
Start: 2025-08-14

## 2025-08-14 RX ORDER — FUROSEMIDE 40 MG/1
TABLET ORAL
Qty: 90 TABLET | Refills: 1 | Status: SHIPPED | OUTPATIENT
Start: 2025-08-14

## 2025-08-19 ENCOUNTER — PATIENT MESSAGE (OUTPATIENT)
Dept: ADMINISTRATIVE | Facility: HOSPITAL | Age: 85
End: 2025-08-19
Payer: MEDICARE

## (undated) DEVICE — CLOSURE SKIN STERI STRIP 1/2X4

## (undated) DEVICE — IRRIGATOR SUCTION W/TIP

## (undated) DEVICE — TUBING PNEUMO

## (undated) DEVICE — SOL IRR NACL .9% 3000ML

## (undated) DEVICE — KIT ANTIFOG

## (undated) DEVICE — CANNULA LAP SEAL Z THRD 5X100

## (undated) DEVICE — BLADE SURG CARBON STEEL SZ11

## (undated) DEVICE — SLEEVE SCD EXPRESS CALF MEDIUM

## (undated) DEVICE — WATER STERILE INJ 500ML BAG

## (undated) DEVICE — JELLY LUBRICANT STERILE 4 OZ

## (undated) DEVICE — SOL CLEARIFY VISUALIZATION LAP

## (undated) DEVICE — ELECTRODE REM PLYHSV RETURN 9

## (undated) DEVICE — SHEET DRAPE MEDIUM

## (undated) DEVICE — KIT PROCEDURE STER INLET CLOSU

## (undated) DEVICE — APPLICATOR CHLORAPREP ORN 26ML

## (undated) DEVICE — LINER SUCTION 3000CC

## (undated) DEVICE — DRESSING LEUKOPLAST FLEX 1X3IN

## (undated) DEVICE — SET CYSTO IRRIGATION UNIV SPIK

## (undated) DEVICE — SWABSTICK BENZOIN 4 IN

## (undated) DEVICE — SYRINGE 0.9% NACL 10MIL PREFIL

## (undated) DEVICE — PACK CUSTOM ENDO CHOLO SLI

## (undated) DEVICE — SHEARS HARMONIC 5CM 36CM

## (undated) DEVICE — SEE MEDLINE ITEM 146292

## (undated) DEVICE — NDL PNEUMO INSUFFLATI 120MM

## (undated) DEVICE — NDL SPINAL 18GX3.5 SPINOCAN

## (undated) DEVICE — BANDAGE ADHESIVE

## (undated) DEVICE — SUT SILK 0 STRANDS 30IN BLK

## (undated) DEVICE — APRON DISPOSP PLASTIC 24INX42

## (undated) DEVICE — SEE MEDLINE ITEM 157117

## (undated) DEVICE — SEE MEDLINE ITEM 152651

## (undated) DEVICE — NDL SAFETY 21G X 1 1/2 ECLPSE

## (undated) DEVICE — SOLN BETADINE SWAB AIDS

## (undated) DEVICE — GLOVE SURG ULTRA TOUCH 7.5

## (undated) DEVICE — SEE MEDLINE ITEM 152622

## (undated) DEVICE — BAG TISS RETRV MONARCH 10MM

## (undated) DEVICE — DISSECTOR CURVED 5DCD

## (undated) DEVICE — SUT 3/0 18IN COATED VICRYLP

## (undated) DEVICE — ELECTRODE BLADE INSULATED 1 IN

## (undated) DEVICE — SEE MEDLINE ITEM 152680

## (undated) DEVICE — SYR 10CC LUER LOCK

## (undated) DEVICE — SUT MONOCRYL 4-0 SH UND MON

## (undated) DEVICE — NDL SPINAL 22GX7 SPINOCAN

## (undated) DEVICE — SCISSOR CURVED ENDOPATH 5MM

## (undated) DEVICE — TROCAR KII FIOS 5MM X 100MM

## (undated) DEVICE — CONTAINER SPECIMEN STRL 4OZ

## (undated) DEVICE — SYR 30CC LUER LOCK

## (undated) DEVICE — SEE MEDLINE ITEM 157181

## (undated) DEVICE — NDL SAFETY 25G X 1.5 ECLIPSE

## (undated) DEVICE — SUT 0 VICRYL / UR6 (J603)

## (undated) DEVICE — SEE MEDLINE ITEM 146417

## (undated) DEVICE — GLOVE SURG ULTRA TOUCH 8

## (undated) DEVICE — SUT 3-0 VICRYL / SH (J416)

## (undated) DEVICE — TROCAR 5X100MM BLADED Z THREAD

## (undated) DEVICE — UNDERGLOVES BIOGEL PI SIZE 8

## (undated) DEVICE — Device

## (undated) DEVICE — UNDERGLOVES BIOGEL PI SZ 6 LF

## (undated) DEVICE — COVER TRANSDUCER LATEX N/STERI

## (undated) DEVICE — SUT MONOCRYL 3-0 SH U/D

## (undated) DEVICE — SYR ORAL ENTERAL PUR 12ML

## (undated) DEVICE — SUT SILK 0 SH 30IN BLK BR

## (undated) DEVICE — NDL HYPODERMIC BLUNT 18G 1.5IN

## (undated) DEVICE — JELLY LUBRICATING STERILE 5 GR

## (undated) DEVICE — GAUZE SPONGE BULKEE 6X6.75IN

## (undated) DEVICE — APPLIER CLIP ENDO LIGAMAX 5MM

## (undated) DEVICE — PACK CUSTOM UNIV BASIN SLI

## (undated) DEVICE — SUT SILK 3-0 SH 18IN BLACK

## (undated) DEVICE — DRAPE LAP TIBURON 77X122IN

## (undated) DEVICE — SUT 1 48IN PDS II VIO MONO